# Patient Record
Sex: FEMALE | Race: BLACK OR AFRICAN AMERICAN | Employment: OTHER | URBAN - METROPOLITAN AREA
[De-identification: names, ages, dates, MRNs, and addresses within clinical notes are randomized per-mention and may not be internally consistent; named-entity substitution may affect disease eponyms.]

---

## 2018-10-20 ENCOUNTER — OP HISTORICAL/CONVERTED ENCOUNTER (OUTPATIENT)
Dept: OTHER | Age: 83
End: 2018-10-20

## 2020-11-11 ENCOUNTER — OFFICE VISIT (OUTPATIENT)
Dept: PRIMARY CARE CLINIC | Age: 85
End: 2020-11-11
Payer: MEDICARE

## 2020-11-11 VITALS — HEART RATE: 72 BPM | TEMPERATURE: 98.5 F | OXYGEN SATURATION: 95 %

## 2020-11-11 DIAGNOSIS — R05.9 COUGH: ICD-10-CM

## 2020-11-11 DIAGNOSIS — Z13.6 SCREENING FOR CARDIOVASCULAR, RESPIRATORY, AND GENITOURINARY DISEASES: Primary | ICD-10-CM

## 2020-11-11 DIAGNOSIS — Z13.89 SCREENING FOR CARDIOVASCULAR, RESPIRATORY, AND GENITOURINARY DISEASES: Primary | ICD-10-CM

## 2020-11-11 DIAGNOSIS — Z13.83 SCREENING FOR CARDIOVASCULAR, RESPIRATORY, AND GENITOURINARY DISEASES: Primary | ICD-10-CM

## 2020-11-11 PROCEDURE — 99202 OFFICE O/P NEW SF 15 MIN: CPT | Performed by: NURSE PRACTITIONER

## 2020-11-11 NOTE — PROGRESS NOTES
Romie Cochran is a 80 y.o. female who was seen in clinic today (11/11/2020) for an acute visit. Assessment/Plan:            * COVID-19 sample collected and submitted       * Patient given detailed CDC instructions contained within After Visit Summary    Diagnoses and all orders for this visit:    1. Screening for cardiovascular, respiratory, and genitourinary diseases    2. Cough       Reviewed with her that COVID-19 pandemic is an evolving situation with rapidly changing recommendations & guidelines, continue to practice hand hygiene, social distancing, wearing of facial coverings. Regardless of testing results, should still follow CDC guidelines as there is a chance of a false negative, or symptoms may be due to a cold or flu which are also transmissible. Medical decisions are made based on the the best information available at the time. Recommended she stay tuned for updates published by trusted sources and to advise your PCP of any unexpected changes in clinical condition     Subjective:   Leah Miguel was seen today for coronavirus screening. Her daughter serves as historian since pt doesn't' speak much. She notes she has a baseline cough, nasal congestion, and noisy exhalations. No observed changes from baseline. Daughter is unaware of a recent history/current: loss of smell/taste, fever. No known covid exposure, would like testing for peace of mind. Travel Screening      No screening recorded since 11/10/20 0000      Travel History   Travel since 10/11/20     No documented travel since 10/11/20          ROS - Pertinent items are noted in HPI    There is no problem list on file for this patient.     Home Medications    Not on File      Allergies not on file       Objective:   Physical Exam  General:  Walks with cane, alert, cooperative, no distress   Ears: Normal external ear canals AU   Sinuses: Normal paranasal sinuses without tenderness           Heart: normal rate, regular rhythm,   Lungs: No visible dyspnea, difficult to auscultate lungs due to noisy exhalations but seem to be clear. There were no vitals taken for this visit. Disclaimer:        Medication risks/benefits/costs/interactions/alternatives discussed with patient. She was given an after visit summary which includes diagnoses, current medications, & vitals. She expressed understanding with the diagnosis and plan. Aspects of this note may have been generated using voice recognition software. Despite editing, there may be some syntax errors.        Ilana Myers, NP

## 2020-11-15 LAB — SARS-COV-2, NAA: NOT DETECTED

## 2020-11-16 ENCOUNTER — TELEPHONE (OUTPATIENT)
Dept: PRIMARY CARE CLINIC | Age: 85
End: 2020-11-16

## 2020-12-04 ENCOUNTER — OFFICE VISIT (OUTPATIENT)
Dept: PRIMARY CARE CLINIC | Age: 85
End: 2020-12-04
Payer: MEDICARE

## 2020-12-04 VITALS
SYSTOLIC BLOOD PRESSURE: 124 MMHG | OXYGEN SATURATION: 97 % | TEMPERATURE: 97.3 F | HEART RATE: 62 BPM | DIASTOLIC BLOOD PRESSURE: 76 MMHG

## 2020-12-04 DIAGNOSIS — Z13.89 SCREENING FOR CARDIOVASCULAR, RESPIRATORY, AND GENITOURINARY DISEASES: Primary | ICD-10-CM

## 2020-12-04 DIAGNOSIS — Z13.6 SCREENING FOR CARDIOVASCULAR, RESPIRATORY, AND GENITOURINARY DISEASES: Primary | ICD-10-CM

## 2020-12-04 DIAGNOSIS — Z13.83 SCREENING FOR CARDIOVASCULAR, RESPIRATORY, AND GENITOURINARY DISEASES: Primary | ICD-10-CM

## 2020-12-04 PROCEDURE — 99212 OFFICE O/P EST SF 10 MIN: CPT | Performed by: NURSE PRACTITIONER

## 2020-12-04 NOTE — PROGRESS NOTES
Reny Cee is a 80 y.o. female who was seen in clinic today (12/4/2020) for an acute visit. Assessment/Plan:            * COVID-19 sample collected and submitted       * Patient given detailed CDC instructions contained within After Visit Summary    Diagnoses and all orders for this visit:    1. Screening for cardiovascular, respiratory, and genitourinary diseases  -     NOVEL CORONAVIRUS (COVID-19)       Surveillance due to high risk category, no known covid 19 exposure, and questionable if patient has a cough or daughter simply stated she did to meet [facility] criteria for testing. Reviewed with her that COVID-19 pandemic is an evolving situation with rapidly changing recommendations & guidelines, continue to practice hand hygiene, social distancing, wearing of facial coverings. Regardless of testing results, should still follow CDC guidelines as there is a chance of a false negative, such as a poor sample collection or being too soon to test after exposure. Medical decisions are made based on the the best information available at the time. Recommended she stay tuned for updates published by trusted sources and to advise your PCP of any unexpected changes in clinical condition     Subjective:   Kimber Mendieta was seen today for Cough (Patient's daughter reports patient has chronic cough.)  Daughter also reported she just wanted her tested for covid 19. She denies a recent history/current: loss of , cough, fever, wheezing, SOB, and SAMPSON. Non user of tob. Travel Screening     Question   Response    In the last month, have you been in contact with someone who was confirmed or suspected to have Coronavirus / COVID-19? No / Unsure    Have you had a COVID-19 viral test in the last 14 days? No    Do you have any of the following new or worsening symptoms? Cough    Have you traveled internationally in the last month?   No      Travel History   Travel since 11/04/20     No documented travel since 11/04/20 ROS - Pertinent items are noted in HPI    There is no problem list on file for this patient. Home Medications    Medication Sig Start Date End Date Taking? Authorizing Provider   ergocalciferol, vitamin D2, (VITAMIN D2 PO) Take  by mouth. Provider, Historical   trazodone HCl (TRAZODONE PO) Take  by mouth. Provider, Historical   LOSARTAN PO Take  by mouth. Provider, Historical   OXCARBAZEPINE PO Take  by mouth. Provider, Historical      No Known Allergies       Objective:   Physical Exam  General:  Overweight, alert, cooperative, no distress, walks with cane, pt evaluated in her vehicle because of difficulty ambulating   Ears: Normal external ear canals AU   Neck: supple, symmetrical, trachea midline. Heart: normal rate, regular rhythm, normal S1, S2, no murmurs, rubs, clicks or gallops. Lungs: clear to auscultation bilaterally       Visit Vitals  /76 (BP 1 Location: Left arm, BP Patient Position: Sitting)   Pulse 62   Temp 97.3 °F (36.3 °C)   SpO2 97%         Disclaimer:        Medication risks/benefits/costs/interactions/alternatives discussed with patient. She was given an after visit summary which includes diagnoses, current medications, & vitals. She expressed understanding with the diagnosis and plan. Aspects of this note may have been generated using voice recognition software. Despite editing, there may be some syntax errors.        Salvador Salazar NP

## 2020-12-08 LAB — SARS-COV-2, NAA: NOT DETECTED

## 2020-12-09 ENCOUNTER — TELEPHONE (OUTPATIENT)
Dept: PRIMARY CARE CLINIC | Age: 85
End: 2020-12-09

## 2021-02-15 ENCOUNTER — HOSPITAL ENCOUNTER (EMERGENCY)
Age: 86
Discharge: HOME | End: 2021-02-16
Attending: EMERGENCY MEDICINE
Payer: MEDICARE

## 2021-02-15 ENCOUNTER — APPOINTMENT (OUTPATIENT)
Dept: CT IMAGING | Age: 86
End: 2021-02-15
Attending: EMERGENCY MEDICINE
Payer: MEDICARE

## 2021-02-15 VITALS
HEART RATE: 76 BPM | BODY MASS INDEX: 20.32 KG/M2 | RESPIRATION RATE: 20 BRPM | DIASTOLIC BLOOD PRESSURE: 87 MMHG | WEIGHT: 150 LBS | HEIGHT: 72 IN | OXYGEN SATURATION: 99 % | TEMPERATURE: 99 F | SYSTOLIC BLOOD PRESSURE: 202 MMHG

## 2021-02-15 DIAGNOSIS — H11.32 SUBCONJUNCTIVAL HEMORRHAGE OF LEFT EYE: ICD-10-CM
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
## 2021-02-15 DIAGNOSIS — S00.12XA PERIORBITAL ECCHYMOSIS OF LEFT EYE, INITIAL ENCOUNTER: ICD-10-CM

## 2021-02-15 DIAGNOSIS — S09.90XA CLOSED HEAD INJURY, INITIAL ENCOUNTER: ICD-10-CM

## 2021-02-15 LAB
ANION GAP SERPL CALC-SCNC: 6 MMOL/L (ref 5–15)
APPEARANCE UR: CLEAR
BACTERIA URNS QL MICRO: NEGATIVE /HPF
BASOPHILS # BLD: 0 K/UL (ref 0–0.1)
BASOPHILS NFR BLD: 0 % (ref 0–1)
BILIRUB UR QL: NEGATIVE
BUN SERPL-MCNC: 38 MG/DL (ref 6–20)
BUN/CREAT SERPL: 21 (ref 12–20)
CA-I BLD-MCNC: 9.6 MG/DL (ref 8.5–10.1)
CHLORIDE SERPL-SCNC: 106 MMOL/L (ref 97–108)
CO2 SERPL-SCNC: 26 MMOL/L (ref 21–32)
COLOR UR: ABNORMAL
CREAT SERPL-MCNC: 1.85 MG/DL (ref 0.55–1.02)
DIFFERENTIAL METHOD BLD: ABNORMAL
EOSINOPHIL # BLD: 0 K/UL (ref 0–0.4)
EOSINOPHIL NFR BLD: 0 % (ref 0–7)
ERYTHROCYTE [DISTWIDTH] IN BLOOD BY AUTOMATED COUNT: 20.2 % (ref 11.5–14.5)
GLUCOSE SERPL-MCNC: 115 MG/DL (ref 65–100)
GLUCOSE UR STRIP.AUTO-MCNC: 50 MG/DL
HCT VFR BLD AUTO: 41.2 % (ref 35–47)
HGB BLD-MCNC: 12.5 G/DL (ref 11.5–16)
HGB UR QL STRIP: ABNORMAL
IMM GRANULOCYTES # BLD AUTO: 0 K/UL (ref 0–0.04)
IMM GRANULOCYTES NFR BLD AUTO: 0 % (ref 0–0.5)
KETONES UR QL STRIP.AUTO: 5 MG/DL
LEUKOCYTE ESTERASE UR QL STRIP.AUTO: NEGATIVE
LYMPHOCYTES # BLD: 0.5 K/UL (ref 0.8–3.5)
LYMPHOCYTES NFR BLD: 7 % (ref 12–49)
MCH RBC QN AUTO: 24.1 PG (ref 26–34)
MCHC RBC AUTO-ENTMCNC: 30.3 G/DL (ref 30–36.5)
MCV RBC AUTO: 79.4 FL (ref 80–99)
MONOCYTES # BLD: 0.2 K/UL (ref 0–1)
MONOCYTES NFR BLD: 2 % (ref 5–13)
NEUTS SEG # BLD: 7.2 K/UL (ref 1.8–8)
NEUTS SEG NFR BLD: 91 % (ref 32–75)
NITRITE UR QL STRIP.AUTO: NEGATIVE
NRBC # BLD: 0 K/UL (ref 0–0.01)
NRBC BLD-RTO: 0 PER 100 WBC
PH UR STRIP: 6 [PH] (ref 5–8)
PLATELET # BLD AUTO: 254 K/UL (ref 150–400)
POTASSIUM SERPL-SCNC: 5 MMOL/L (ref 3.5–5.1)
PROT UR STRIP-MCNC: 100 MG/DL
RBC # BLD AUTO: 5.19 M/UL (ref 3.8–5.2)
RBC #/AREA URNS HPF: ABNORMAL /HPF (ref 0–5)
SODIUM SERPL-SCNC: 138 MMOL/L (ref 136–145)
SP GR UR REFRACTOMETRY: 1.01 (ref 1–1.03)
UA: UC IF INDICATED,UAUC: ABNORMAL
UROBILINOGEN UR QL STRIP.AUTO: 0.1 EU/DL (ref 0.1–1)
WBC # BLD AUTO: 7.9 K/UL (ref 3.6–11)
WBC URNS QL MICRO: ABNORMAL /HPF (ref 0–4)

## 2021-02-15 PROCEDURE — 70486 CT MAXILLOFACIAL W/O DYE: CPT

## 2021-02-15 PROCEDURE — 36415 COLL VENOUS BLD VENIPUNCTURE: CPT

## 2021-02-15 PROCEDURE — 85025 COMPLETE CBC W/AUTO DIFF WBC: CPT

## 2021-02-15 PROCEDURE — 99283 EMERGENCY DEPT VISIT LOW MDM: CPT

## 2021-02-15 PROCEDURE — 70450 CT HEAD/BRAIN W/O DYE: CPT

## 2021-02-15 PROCEDURE — 72125 CT NECK SPINE W/O DYE: CPT

## 2021-02-15 PROCEDURE — 80048 BASIC METABOLIC PNL TOTAL CA: CPT

## 2021-02-15 PROCEDURE — 81001 URINALYSIS AUTO W/SCOPE: CPT

## 2021-02-15 RX ORDER — ERYTHROMYCIN 5 MG/G
OINTMENT OPHTHALMIC
Qty: 1 TUBE | Refills: 0 | Status: SHIPPED | OUTPATIENT
Start: 2021-02-15 | End: 2021-02-22

## 2021-02-15 NOTE — ED PROVIDER NOTES
EMERGENCY DEPARTMENT HISTORY AND PHYSICAL EXAM      Date: 2/15/2021  Patient Name: Sarah Marsh    History of Presenting Illness     Chief Complaint   Patient presents with    Fall       History Provided By: Patient and patient's daughter    HPI: Sarah Marsh, 80 y.o. female with PMHx as noted below presents the emergency department evaluation of fall. History is limited secondary to patient's dementia. Patient's daughter notes that she is unsteady on her feet at baseline and believes she tried to get out of bed on her own today falling to the ground. Patient's daughter heard the fall and went upstairs to check on her. Noted some swelling to her left eyes so brought her to the emergency department for evaluation. Otherwise notes patient is at her baseline mental status. Patient with no complaints when asked. PCP: Calvin Rowe MD    Current Outpatient Medications   Medication Sig Dispense Refill    erythromycin (ILOTYCIN) ophthalmic ointment 0.5cm ribbon under affected eyelid 3-4 times a day for 1 week 1 Tube 0    ergocalciferol, vitamin D2, (VITAMIN D2 PO) Take  by mouth.  trazodone HCl (TRAZODONE PO) Take  by mouth.  LOSARTAN PO Take  by mouth.  OXCARBAZEPINE PO Take  by mouth. Past History     Past Medical History:  Past Medical History:   Diagnosis Date    Hypertension        Past Surgical History:  No past surgical history on file. Family History:  Family History   Family history unknown: Yes       Social History:  Social History     Tobacco Use    Smoking status: Never Smoker    Smokeless tobacco: Never Used   Substance Use Topics    Alcohol use: Not on file    Drug use: Not on file       Allergies:  No Known Allergies      Review of Systems   Review of Systems   Unable to perform ROS: Dementia       Physical Exam   Physical Exam    GENERAL: alert , no acute distress  EYES: PEERL, there is left subconjunctival hemorrhage and chemosis.   There is periorbital ecchymosis and edema  ENT: Mucous membranes pink and moist.  NECK: Supple  LUNGS: Airway patent. Non-labored respirations. Breath sounds clear with good air entry bilaterally. HEART: Regular rate and rhythm. No peripheral edema  ABDOMEN: Non-distended and non-tender, without guarding or rebound. SKIN:  warm, dry  MSK/EXTREMITIES: Without swelling, tenderness or deformity, symmetric with normal ROM  NEUROLOGICAL: Alert,, will follow commands, moving all 4 extremities. Diagnostic Study Results     Labs -     Reviewed    Radiologic Studies -   CT MAXILLOFACIAL WO CONT   Final Result      Left periorbital facial soft tissue abnormalities consistent with   contusion/hematoma. No underlying facial fractures. CT SPINE CERV WO CONT   Final Result   1. No acute bony findings. 2. T4 vertebral body compression has occurred in the interim but appears   nonacute. Correlate for any focal pain. CT HEAD WO CONT   Final Result   No acute intracranial process. Possible nondisplaced fracture of   the left zygoma. Other findings as above. CT Results  (Last 48 hours)               02/15/21 1617  CT MAXILLOFACIAL WO CONT Final result    Impression:      Left periorbital facial soft tissue abnormalities consistent with   contusion/hematoma. No underlying facial fractures. Narrative:  CT MAXILLOFACIAL WO CONT     2/15/2021 4:17 PM; SRM         Indication: 80years old;  Female; left orbitla edema, eval for fx/ retrobulbar   hematoma; Technique: Noncontrast CT exam of the face was obtained according to standard   protocol. Multiplanar reconstructions are provided. Contrast: None. Dose reduction: All CT scans at this facility are performed using dose reduction   optimization techniques as appropriate to the performed exam including the   following: Automatic exposure control; adjustment of the mA and/or kV according   to patient size; or the use of reconstruction techniques.         Comparison: None Findings:       Left periorbital facial soft tissue abnormalities consistent with   contusion/hematoma in the given clinical setting. No underlying orbital fracture   deformities are evident. Remaining facial bones are intact. Temporomandibular joints demonstrate   significant osteoarthritic changes but otherwise articulate normally. Carious   dentition. Upper cervical degenerative osteoarthritic changes. No erosive or destructive   findings. Partial imaged brain demonstrates age appropriate volume loss. Globes and   orbital soft tissues are unremarkable. Heavily calcified atherosclerotic plaques   at the bifurcations. 02/15/21 1617  CT SPINE CERV WO CONT Final result    Impression:  1. No acute bony findings. 2. T4 vertebral body compression has occurred in the interim but appears   nonacute. Correlate for any focal pain. Narrative:  Fall. Comparison CT of the cervical spine 6/13/2016. Technique: Axial images cervical spine with sagittal and coronal reformats. 3-D   MIPs. Dose reduction: All CT scans at this facility are performed using dose reduction   optimization techniques as appropriate to a performed exam including the   following: Automated exposure control, adjustments of the mA and/or kV according   to patient's size, or use of iterative reconstruction technique. FINDINGS: The bones are demineralized which limits evaluation for fracture. No   listhesis. C6, C7, T1 vertebral body compressions are unchanged. T4 vertebral   body almost complete compression appears nonacute but has occurred in the   interim. No evident acute fracture, jumped or perched facet. Multilevel disc   greater than placenta narrowing, sclerosis, osteophytosis. 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 Lateral pillars similar alignment as previous. No prevertebral soft tissue   swelling. Lung apices clear.            02/15/21 1229  CT HEAD WO CONT Final result    Impression:  No acute intracranial process. Possible nondisplaced fracture of   the left zygoma. Other findings as above. Narrative:  CT head, 2/15/2021       History: Fall. Technique: No intravenous contrast was administered. Multiple contiguous axial   images were acquired from the vertex to the skull base. Coronal and sagittal   reconstruction was made. All CT scans at this facility are performed using dose reduction optimization   techniques as appropriate to perform the exam including the following: Automated   exposure control, adjustments of the mA and/or kV according to patient size, or   use iterative reconstruction technique. Comparison: Including CT head 11/10/2019. Findings: Image quality is degraded by motion. Cortical volume loss and associated ventricular system dilatation are stable. Periventricular and subcortical low attenuation is suggestive of small vessel   ischemic disease. Gray-white differentiation is preserved. No midline shift or   acute intracranial hemorrhage is noted. The calvarium appears intact. Degenerative changes at C1-C2 are partially imaged. Left-sided periorbital soft tissue swelling and hematoma are present. The   orbits and globes are intact. The nasal bones show no acute findings. The   right zygoma is intact. The left zygoma has linear lucency which could be   artifact as image quality is degraded at this level or nondisplaced fracture. The paranasal sinuses and mastoid air cells are clear. CXR Results  (Last 48 hours)    None            Medical Decision Making     ILyla MD am the first provider for this patient and am the attending of record for this patient encounter. I reviewed the vital signs, available nursing notes, past medical history, past surgical history, family history and social history. Vital Signs-Reviewed the patient's vital signs.     Records Reviewed: Nursing Notes and Old Medical Records    Provider Notes (Medical Decision Making): On presentation, the patient is well appearing, in no acute distress. Based on my history and exam the differential diagnosis for this patient includes intracranial hemorrhage, cervical spine injury, facial fracture, syncope. Labs are notable for elevated creatinine. Otherwise CT scans showed no acute findings. On exam patient does have a left subconjunctival hemorrhage with some chemosis noted. Unable to assess visual acuity secondary to patient's underlying dementia. I did discuss with patient's daughter as she notes the patient does have chronic kidney disease and follows with a nephrologist although is unsure of her baseline creatinine however feels she is probably not far from her baseline as she is seeing a nephrologist.  Patient's daughter expresses a follow-up from the clinic at home given her advanced age would not want any heroic interventions or surgeries performed. I informed her that she will need to see an ophthalmologist secondary to left eye chemosis and subconjunctival hemorrhage however given her age do not feel that transferring for this would be in her best interest.  Patient's blood pressure was elevated and daughter was made aware. Feel that some degree of this is anxiety of being in the emergency department without any familiar faces to comfort her. This should be rechecked within 48 hours by primary care physician. ED Course:   Initial assessment performed. The patients presenting problems have been discussed with the patient's daughter, and they are in agreement with the care plan formulated and outlined with them. I have encouraged them to ask questions as they arise throughout their visit. PROGRESS  Krista Sam Errol's  results have been reviewed with her and her daughter. She has been counseled regarding her diagnosis.   She verbally conveys understanding and agreement of the signs, symptoms, diagnosis, treatment and prognosis and additionally agrees to follow up as recommended. She also agrees with the care-plan and conveys that all of her questions have been answered. I have also put together some discharge instructions for her that include: 1) educational information regarding their diagnosis, 2) how to care for their diagnosis at home, as well a 3) list of reasons why they would want to return to the ED prior to their follow-up appointment, should their condition change. Disposition:  home    PLAN:  1. Discharge Medication List as of 2/16/2021  6:01 AM      START taking these medications    Details   erythromycin (ILOTYCIN) ophthalmic ointment 0.5cm ribbon under affected eyelid 3-4 times a day for 1 week, Normal, Disp-1 Tube, R-0         CONTINUE these medications which have NOT CHANGED    Details   ergocalciferol, vitamin D2, (VITAMIN D2 PO) Take  by mouth., Historical Med      trazodone HCl (TRAZODONE PO) Take  by mouth., Historical Med      LOSARTAN PO Take  by mouth., Historical Med      OXCARBAZEPINE PO Take  by mouth., Historical Med           2. Follow-up Information     Follow up With Specialties Details Why [address] EMERGENCY DEPT Emergency Medicine  If symptoms worsen [address] [address]  [phone]    Follow-up with primary care physician in 1 to 2 days        [facility]  Schedule an appointment as soon as possible for a visit in 2 days  [address] [place] [address]        Return to ED if worse     Diagnosis     Clinical Impression:   1. Fall, initial encounter    2. Closed head injury, initial encounter    3. Subconjunctival hemorrhage of left eye    4. Periorbital ecchymosis of left eye, initial encounter        Please note that this dictation was completed with Dragon, computer voice recognition software.   Quite often unanticipated grammatical, syntax, homophones, and other interpretive errors are inadvertently transcribed by the computer software. Please disregard these errors. Additionally, please excuse any errors that have escaped final proofreading.

## 2021-02-15 NOTE — ED TRIAGE NOTES
[facility] 14 this am pt sustained a fall around 4 am; daughter stated that pt did not have any LOC unsure if pt takes blood thinners; pt does have swelling over left eye

## 2021-04-08 ENCOUNTER — HOSPITAL ENCOUNTER (INPATIENT)
Age: 86
LOS: 9 days | Discharge: NURSING, REHAB OR LONG-TERM CARE | DRG: 177 | End: 2021-04-18
Attending: EMERGENCY MEDICINE | Admitting: FAMILY MEDICINE
Payer: MEDICARE

## 2021-04-08 ENCOUNTER — APPOINTMENT (OUTPATIENT)
Dept: GENERAL RADIOLOGY | Age: 86
DRG: 177 | End: 2021-04-08
Attending: EMERGENCY MEDICINE
Payer: MEDICARE

## 2021-04-08 DIAGNOSIS — Z87.448 HISTORY OF RENAL DISEASE: ICD-10-CM

## 2021-04-08 DIAGNOSIS — N39.0 URINARY TRACT INFECTION WITHOUT HEMATURIA, SITE UNSPECIFIED: ICD-10-CM

## 2021-04-08 DIAGNOSIS — J18.9 PNEUMONIA OF LEFT LOWER LOBE DUE TO INFECTIOUS ORGANISM: Primary | ICD-10-CM

## 2021-04-08 LAB
ALBUMIN SERPL-MCNC: 2.6 G/DL (ref 3.5–5)
ALBUMIN/GLOB SERPL: 0.5 {RATIO} (ref 1.1–2.2)
ALP SERPL-CCNC: 53 U/L (ref 45–117)
ALT SERPL-CCNC: 14 U/L (ref 12–78)
ANION GAP SERPL CALC-SCNC: 7 MMOL/L (ref 5–15)
APPEARANCE UR: ABNORMAL
AST SERPL W P-5'-P-CCNC: 33 U/L (ref 15–37)
BACTERIA URNS QL MICRO: ABNORMAL /HPF
BASOPHILS # BLD: 0 K/UL (ref 0–0.1)
BASOPHILS NFR BLD: 0 % (ref 0–1)
BILIRUB SERPL-MCNC: 0.4 MG/DL (ref 0.2–1)
BILIRUB UR QL: NEGATIVE
BNP SERPL-MCNC: 1559 PG/ML
BUN SERPL-MCNC: 54 MG/DL (ref 6–20)
BUN/CREAT SERPL: 17 (ref 12–20)
CA-I BLD-MCNC: 9.2 MG/DL (ref 8.5–10.1)
CHLORIDE SERPL-SCNC: 105 MMOL/L (ref 97–108)
CO2 SERPL-SCNC: 28 MMOL/L (ref 21–32)
COLOR UR: YELLOW
CREAT SERPL-MCNC: 3.24 MG/DL (ref 0.55–1.02)
DIFFERENTIAL METHOD BLD: ABNORMAL
EOSINOPHIL # BLD: 0 K/UL (ref 0–0.4)
EOSINOPHIL NFR BLD: 0 % (ref 0–7)
ERYTHROCYTE [DISTWIDTH] IN BLOOD BY AUTOMATED COUNT: 18.6 % (ref 11.5–14.5)
GLOBULIN SER CALC-MCNC: 4.9 G/DL (ref 2–4)
GLUCOSE SERPL-MCNC: 239 MG/DL (ref 65–100)
GLUCOSE UR STRIP.AUTO-MCNC: NEGATIVE MG/DL
HCT VFR BLD AUTO: 34.2 % (ref 35–47)
HEMOCCULT SP1 STL QL: NEGATIVE
HGB BLD-MCNC: 10.6 G/DL (ref 11.5–16)
HGB UR QL STRIP: ABNORMAL
IMM GRANULOCYTES # BLD AUTO: 0.1 K/UL (ref 0–0.04)
IMM GRANULOCYTES NFR BLD AUTO: 1 % (ref 0–0.5)
KETONES UR QL STRIP.AUTO: NEGATIVE MG/DL
LACTATE SERPL-SCNC: 1.4 MMOL/L (ref 0.4–2)
LEUKOCYTE ESTERASE UR QL STRIP.AUTO: ABNORMAL
LYMPHOCYTES # BLD: 0.6 K/UL (ref 0.8–3.5)
LYMPHOCYTES NFR BLD: 5 % (ref 12–49)
MCH RBC QN AUTO: 23.8 PG (ref 26–34)
MCHC RBC AUTO-ENTMCNC: 31 G/DL (ref 30–36.5)
MCV RBC AUTO: 76.9 FL (ref 80–99)
MONOCYTES # BLD: 0.3 K/UL (ref 0–1)
MONOCYTES NFR BLD: 2 % (ref 5–13)
NEUTS SEG # BLD: 11.4 K/UL (ref 1.8–8)
NEUTS SEG NFR BLD: 92 % (ref 32–75)
NITRITE UR QL STRIP.AUTO: NEGATIVE
NRBC # BLD: 0 K/UL (ref 0–0.01)
NRBC BLD-RTO: 0 PER 100 WBC
OTHER URINE MICRO,5051: PRESENT
PH UR STRIP: 5 [PH] (ref 5–8)
PLATELET # BLD AUTO: 288 K/UL (ref 150–400)
PMV BLD AUTO: 10.7 FL (ref 8.9–12.9)
POTASSIUM SERPL-SCNC: 4.2 MMOL/L (ref 3.5–5.1)
PROT SERPL-MCNC: 7.5 G/DL (ref 6.4–8.2)
PROT UR STRIP-MCNC: 100 MG/DL
RBC # BLD AUTO: 4.45 M/UL (ref 3.8–5.2)
RBC #/AREA URNS HPF: ABNORMAL /HPF (ref 0–5)
SODIUM SERPL-SCNC: 140 MMOL/L (ref 136–145)
SP GR UR REFRACTOMETRY: 1.01 (ref 1–1.03)
TROPONIN I SERPL-MCNC: 0.06 NG/ML
UA: UC IF INDICATED,UAUC: ABNORMAL
UROBILINOGEN UR QL STRIP.AUTO: 0.1 EU/DL (ref 0.1–1)
WBC # BLD AUTO: 12.3 K/UL (ref 3.6–11)
WBC URNS QL MICRO: ABNORMAL /HPF (ref 0–4)
YEAST URNS QL MICRO: PRESENT

## 2021-04-08 PROCEDURE — 84484 ASSAY OF TROPONIN QUANT: CPT

## 2021-04-08 PROCEDURE — 74011000258 HC RX REV CODE- 258: Performed by: EMERGENCY MEDICINE

## 2021-04-08 PROCEDURE — 96365 THER/PROPH/DIAG IV INF INIT: CPT

## 2021-04-08 PROCEDURE — 36415 COLL VENOUS BLD VENIPUNCTURE: CPT

## 2021-04-08 PROCEDURE — 82272 OCCULT BLD FECES 1-3 TESTS: CPT

## 2021-04-08 PROCEDURE — 93005 ELECTROCARDIOGRAM TRACING: CPT

## 2021-04-08 PROCEDURE — 83605 ASSAY OF LACTIC ACID: CPT

## 2021-04-08 PROCEDURE — 74011250636 HC RX REV CODE- 250/636: Performed by: EMERGENCY MEDICINE

## 2021-04-08 PROCEDURE — 99285 EMERGENCY DEPT VISIT HI MDM: CPT

## 2021-04-08 PROCEDURE — 87086 URINE CULTURE/COLONY COUNT: CPT

## 2021-04-08 PROCEDURE — 51701 INSERT BLADDER CATHETER: CPT

## 2021-04-08 PROCEDURE — 96366 THER/PROPH/DIAG IV INF ADDON: CPT

## 2021-04-08 PROCEDURE — 85025 COMPLETE CBC W/AUTO DIFF WBC: CPT

## 2021-04-08 PROCEDURE — 74011250637 HC RX REV CODE- 250/637: Performed by: NURSE PRACTITIONER

## 2021-04-08 PROCEDURE — 83880 ASSAY OF NATRIURETIC PEPTIDE: CPT

## 2021-04-08 PROCEDURE — 71045 X-RAY EXAM CHEST 1 VIEW: CPT

## 2021-04-08 PROCEDURE — 80053 COMPREHEN METABOLIC PANEL: CPT

## 2021-04-08 PROCEDURE — 81001 URINALYSIS AUTO W/SCOPE: CPT

## 2021-04-08 RX ORDER — ACETAMINOPHEN 650 MG/1
650 SUPPOSITORY RECTAL
Status: COMPLETED | OUTPATIENT
Start: 2021-04-08 | End: 2021-04-08

## 2021-04-08 RX ADMIN — SODIUM CHLORIDE 500 ML: 9 INJECTION, SOLUTION INTRAVENOUS at 22:20

## 2021-04-08 RX ADMIN — ACETAMINOPHEN 650 MG: 650 SUPPOSITORY RECTAL at 20:40

## 2021-04-08 RX ADMIN — PIPERACILLIN AND TAZOBACTAM 3.38 G: 3; .375 INJECTION, POWDER, LYOPHILIZED, FOR SOLUTION INTRAVENOUS at 22:20

## 2021-04-08 NOTE — ED TRIAGE NOTES
Pt from [facility]. Was choking on his dinner and is having sob non verbal per ems ANO x 1 is baseline.

## 2021-04-08 NOTE — Clinical Note
Status[de-identified] INPATIENT [101]   Type of Bed: Telemetry [19]   Inpatient Hospitalization Certified Necessary for the Following Reasons: 3.  Patient receiving treatment that can only be provided in an inpatient setting (further clarification in H&P documentation)   Admitting Diagnosis: Pneumonia [787319]   Admitting Physician: Rebekah Prakash [[id number]]   Attending Physician: Rebekah Prakash [[id number]]   Estimated Length of Stay: 2 Midnights   Discharge Plan[de-identified] Extended Care Facility (e.g. Adult Home, Nursing Home, etc.)

## 2021-04-09 PROBLEM — J18.9 PNEUMONIA: Status: ACTIVE | Noted: 2021-04-09

## 2021-04-09 PROBLEM — U07.1 COVID-19: Status: ACTIVE | Noted: 2021-04-09

## 2021-04-09 LAB
ATRIAL RATE: 100 BPM
CALCULATED P AXIS, ECG09: 82 DEGREES
CALCULATED R AXIS, ECG10: 4 DEGREES
CALCULATED T AXIS, ECG11: 93 DEGREES
COVID-19 RAPID TEST, COVR: DETECTED
DIAGNOSIS, 93000: NORMAL
GLUCOSE BLD STRIP.AUTO-MCNC: 126 MG/DL (ref 65–100)
GLUCOSE BLD STRIP.AUTO-MCNC: 145 MG/DL (ref 65–100)
GLUCOSE BLD STRIP.AUTO-MCNC: 226 MG/DL (ref 65–100)
P-R INTERVAL, ECG05: 224 MS
PERFORMED BY, TECHID: ABNORMAL
Q-T INTERVAL, ECG07: 332 MS
QRS DURATION, ECG06: 86 MS
QTC CALCULATION (BEZET), ECG08: 428 MS
SARS-COV-2, COV2: NORMAL
SPECIMEN SOURCE: ABNORMAL
VENTRICULAR RATE, ECG03: 100 BPM

## 2021-04-09 PROCEDURE — 74011000250 HC RX REV CODE- 250: Performed by: FAMILY MEDICINE

## 2021-04-09 PROCEDURE — 65270000029 HC RM PRIVATE

## 2021-04-09 PROCEDURE — 82962 GLUCOSE BLOOD TEST: CPT

## 2021-04-09 PROCEDURE — 87635 SARS-COV-2 COVID-19 AMP PRB: CPT

## 2021-04-09 PROCEDURE — 74011250636 HC RX REV CODE- 250/636: Performed by: FAMILY MEDICINE

## 2021-04-09 PROCEDURE — 96366 THER/PROPH/DIAG IV INF ADDON: CPT

## 2021-04-09 RX ORDER — ACETAMINOPHEN 325 MG/1
650 TABLET ORAL
Status: DISCONTINUED | OUTPATIENT
Start: 2021-04-09 | End: 2021-04-18 | Stop reason: HOSPADM

## 2021-04-09 RX ORDER — HEPARIN SODIUM 5000 [USP'U]/ML
5000 INJECTION, SOLUTION INTRAVENOUS; SUBCUTANEOUS EVERY 8 HOURS
Status: DISCONTINUED | OUTPATIENT
Start: 2021-04-09 | End: 2021-04-18 | Stop reason: HOSPADM

## 2021-04-09 RX ORDER — POLYETHYLENE GLYCOL 3350 17 G/17G
17 POWDER, FOR SOLUTION ORAL DAILY PRN
Status: DISCONTINUED | OUTPATIENT
Start: 2021-04-09 | End: 2021-04-18 | Stop reason: HOSPADM

## 2021-04-09 RX ORDER — ONDANSETRON 2 MG/ML
4 INJECTION INTRAMUSCULAR; INTRAVENOUS
Status: DISCONTINUED | OUTPATIENT
Start: 2021-04-09 | End: 2021-04-18 | Stop reason: HOSPADM

## 2021-04-09 RX ORDER — ACETAMINOPHEN 650 MG/1
650 SUPPOSITORY RECTAL
Status: DISCONTINUED | OUTPATIENT
Start: 2021-04-09 | End: 2021-04-18 | Stop reason: HOSPADM

## 2021-04-09 RX ORDER — DEXAMETHASONE SODIUM PHOSPHATE 4 MG/ML
4 INJECTION, SOLUTION INTRA-ARTICULAR; INTRALESIONAL; INTRAMUSCULAR; INTRAVENOUS; SOFT TISSUE EVERY 8 HOURS
Status: DISCONTINUED | OUTPATIENT
Start: 2021-04-09 | End: 2021-04-12

## 2021-04-09 RX ORDER — SODIUM CHLORIDE 9 MG/ML
100 INJECTION, SOLUTION INTRAVENOUS CONTINUOUS
Status: DISCONTINUED | OUTPATIENT
Start: 2021-04-09 | End: 2021-04-13

## 2021-04-09 RX ORDER — ONDANSETRON 4 MG/1
4 TABLET, ORALLY DISINTEGRATING ORAL
Status: DISCONTINUED | OUTPATIENT
Start: 2021-04-09 | End: 2021-04-18 | Stop reason: HOSPADM

## 2021-04-09 RX ADMIN — DEXAMETHASONE SODIUM PHOSPHATE 4 MG: 4 INJECTION, SOLUTION INTRAMUSCULAR; INTRAVENOUS at 21:00

## 2021-04-09 RX ADMIN — SODIUM CHLORIDE 50 ML/HR: 9 INJECTION, SOLUTION INTRAVENOUS at 15:00

## 2021-04-09 RX ADMIN — AZITHROMYCIN DIHYDRATE 500 MG: 500 INJECTION, POWDER, LYOPHILIZED, FOR SOLUTION INTRAVENOUS at 15:47

## 2021-04-09 RX ADMIN — DEXAMETHASONE SODIUM PHOSPHATE 4 MG: 4 INJECTION, SOLUTION INTRAMUSCULAR; INTRAVENOUS at 15:46

## 2021-04-09 RX ADMIN — HEPARIN SODIUM 5000 UNITS: 5000 INJECTION INTRAVENOUS; SUBCUTANEOUS at 15:57

## 2021-04-09 RX ADMIN — CEFTRIAXONE SODIUM 1 G: 1 INJECTION, POWDER, FOR SOLUTION INTRAMUSCULAR; INTRAVENOUS at 15:46

## 2021-04-09 RX ADMIN — HEPARIN SODIUM 5000 UNITS: 5000 INJECTION INTRAVENOUS; SUBCUTANEOUS at 21:00

## 2021-04-09 NOTE — ED PROVIDER NOTES
EMERGENCY DEPARTMENT HISTORY AND PHYSICAL EXAM      Date: 4/8/2021  Patient Name: Reji Heller      History of Presenting Illness     Chief Complaint   Patient presents with    Respiratory Distress       History Provided By: Patient, EMS and Nursing Home/SNF/Rehab Center    HPI: Reji Heller, 80 y.o. female with a past medical history significant hypertension, chronic kidney disease, insomnia, Covid infection, presents to the ED with cc of acute onset of shortness of breath and cough while having dinner that happened sometime this afternoon at the skilled care facility. Patient was transported to the emergency room. It appears patient has history of dementia in the past.  No more information has been given. Most of information was given by EMS and nursing home staff. No other complaints. There are no other complaints, changes, or physical findings at this time. PCP: Calvin Rowe MD    Current Facility-Administered Medications   Medication Dose Route Frequency Provider Last Rate Last Admin    piperacillin-tazobactam (ZOSYN) 3.375 g in 0.9% sodium chloride (MBP/ADV) 100 mL MBP  3.375 g IntraVENous NOW Adonay Schuster, DO 25 mL/hr at 04/08/21 2220 3.375 g at 04/08/21 2220     Current Outpatient Medications   Medication Sig Dispense Refill    ergocalciferol, vitamin D2, (VITAMIN D2 PO) Take  by mouth.  trazodone HCl (TRAZODONE PO) Take  by mouth.  LOSARTAN PO Take  by mouth.  OXCARBAZEPINE PO Take  by mouth. Past History     Past Medical History:  Past Medical History:   Diagnosis Date    Anemia     Chronic kidney disease     COVID-19     Hypertension     Hypothyroidism     Insomnia        Past Surgical History:  History reviewed. No pertinent surgical history.     Family History:  Family History   Family history unknown: Yes       Social History:  Social History     Tobacco Use    Smoking status: Never Smoker    Smokeless tobacco: Never Used   Substance Use Topics    Alcohol use: Not on file    Drug use: Not on file       Allergies:  No Known Allergies      Review of Systems     Review of Systems   Constitutional: Positive for chills, fatigue and fever. HENT: Negative. Eyes: Negative. Respiratory: Positive for cough and shortness of breath. Cardiovascular: Negative. Gastrointestinal: Negative. Endocrine: Negative. Genitourinary: Negative. Musculoskeletal: Negative. Skin: Negative. Allergic/Immunologic: Negative. Neurological: Negative. Psychiatric/Behavioral: Negative. All other systems reviewed and are negative. Physical Exam     Physical Exam  Vitals signs and nursing note reviewed. Constitutional:       General: She is in acute distress. Appearance: Normal appearance. She is ill-appearing. HENT:      Head: Normocephalic and atraumatic. Nose: Nose normal. No congestion or rhinorrhea. Eyes:      General: No scleral icterus. Right eye: No discharge. Left eye: No discharge. Extraocular Movements: Extraocular movements intact. Conjunctiva/sclera: Conjunctivae normal.      Pupils: Pupils are equal, round, and reactive to light. Neck:      Musculoskeletal: Normal range of motion and neck supple. No neck rigidity or muscular tenderness. Cardiovascular:      Rate and Rhythm: Normal rate and regular rhythm. Pulses: Normal pulses. Heart sounds: Normal heart sounds. Pulmonary:      Effort: Respiratory distress present. Breath sounds: Rhonchi present. Comments: Decreased breath sounds at the left lower lobe. Abdominal:      General: Abdomen is flat. There is no distension. Palpations: Abdomen is soft. Tenderness: There is no abdominal tenderness. There is no right CVA tenderness, left CVA tenderness, guarding or rebound. Musculoskeletal: Normal range of motion. General: No swelling, tenderness, deformity or signs of injury. Right lower leg: No edema. Left lower leg: No edema. Skin:     General: Skin is warm. Coloration: Skin is not jaundiced or pale. Findings: No bruising, erythema, lesion or rash. Neurological:      General: No focal deficit present. Mental Status: She is alert. Mental status is at baseline. Cranial Nerves: No cranial nerve deficit. Sensory: No sensory deficit. Comments: Positive generalized weakness. No other acute neurological deficit. Psychiatric:         Behavior: Behavior normal.         Lab and Diagnostic Study Results     Labs -     Recent Results (from the past 12 hour(s))   CBC WITH AUTOMATED DIFF    Collection Time: 04/08/21  8:00 PM   Result Value Ref Range    WBC 12.3 (H) 3.6 - 11.0 K/uL    RBC 4.45 3.80 - 5.20 M/uL    HGB 10.6 (L) 11.5 - 16.0 g/dL    HCT 34.2 (L) 35.0 - 47.0 %    MCV 76.9 (L) 80.0 - 99.0 FL    MCH 23.8 (L) 26.0 - 34.0 PG    MCHC 31.0 30.0 - 36.5 g/dL    RDW 18.6 (H) 11.5 - 14.5 %    PLATELET 346 585 - 007 K/uL    MPV 10.7 8.9 - 12.9 FL    NRBC 0.0 0  WBC    ABSOLUTE NRBC 0.00 0.00 - 0.01 K/uL    NEUTROPHILS 92 (H) 32 - 75 %    LYMPHOCYTES 5 (L) 12 - 49 %    MONOCYTES 2 (L) 5 - 13 %    EOSINOPHILS 0 0 - 7 %    BASOPHILS 0 0 - 1 %    IMMATURE GRANULOCYTES 1 (H) 0.0 - 0.5 %    ABS. NEUTROPHILS 11.4 (H) 1.8 - 8.0 K/UL    ABS. LYMPHOCYTES 0.6 (L) 0.8 - 3.5 K/UL    ABS. MONOCYTES 0.3 0.0 - 1.0 K/UL    ABS. EOSINOPHILS 0.0 0.0 - 0.4 K/UL    ABS. BASOPHILS 0.0 0.0 - 0.1 K/UL    ABS. IMM.  GRANS. 0.1 (H) 0.00 - 0.04 K/UL    DF AUTOMATED     METABOLIC PANEL, COMPREHENSIVE    Collection Time: 04/08/21  8:00 PM   Result Value Ref Range    Sodium 140 136 - 145 mmol/L    Potassium 4.2 3.5 - 5.1 mmol/L    Chloride 105 97 - 108 mmol/L    CO2 28 21 - 32 mmol/L    Anion gap 7 5 - 15 mmol/L    Glucose 239 (H) 65 - 100 mg/dL    BUN 54 (H) 6 - 20 mg/dL    Creatinine 3.24 (H) 0.55 - 1.02 mg/dL    BUN/Creatinine ratio 17 12 - 20      GFR est AA 16 (L) >60 ml/min/1.73m2    GFR est non-AA 13 (L) >60 ml/min/1.73m2    Calcium 9.2 8.5 - 10.1 mg/dL    Bilirubin, total 0.4 0.2 - 1.0 mg/dL    AST (SGOT) 33 15 - 37 U/L    ALT (SGPT) 14 12 - 78 U/L    Alk. phosphatase 53 45 - 117 U/L    Protein, total 7.5 6.4 - 8.2 g/dL    Albumin 2.6 (L) 3.5 - 5.0 g/dL    Globulin 4.9 (H) 2.0 - 4.0 g/dL    A-G Ratio 0.5 (L) 1.1 - 2.2     TROPONIN I    Collection Time: 04/08/21  8:00 PM   Result Value Ref Range    Troponin-I, Qt. 0.06 (H) <0.05 ng/mL   BNP    Collection Time: 04/08/21  8:00 PM   Result Value Ref Range    NT pro-BNP 1,559 (H) <450 pg/mL   LACTIC ACID    Collection Time: 04/08/21  8:00 PM   Result Value Ref Range    Lactic acid 1.4 0.4 - 2.0 mmol/L   OCCULT BLOOD, STOOL    Collection Time: 04/08/21  8:00 PM   Result Value Ref Range    Occult Blood,day 1 Negative Negative     URINALYSIS W/ REFLEX CULTURE    Collection Time: 04/08/21  8:00 PM    Specimen: Urine   Result Value Ref Range    Color Yellow      Appearance Turbid (A) Clear      Specific gravity 1.015 1.003 - 1.030      pH (UA) 5.0 5.0 - 8.0      Protein 100 (A) Negative mg/dL    Glucose Negative Negative mg/dL    Ketone Negative Negative mg/dL    Bilirubin Negative Negative      Blood Small (A) Negative      Urobilinogen 0.1 0.1 - 1.0 EU/dL    Nitrites Negative Negative      Leukocyte Esterase Trace (A) Negative      UA:UC IF INDICATED Urine Culture Ordered (A) Culture not indicated by UA result      WBC 20-50 0 - 4 /hpf    RBC 0-5 0 - 5 /hpf    Bacteria 4+ (A) Negative /hpf    PRESUMPTIVE YEAST Present      Other Urine Micro Present         Radiologic Studies -   [unfilled]  CT Results  (Last 48 hours)    None        CXR Results  (Last 48 hours)               04/08/21 2000  XR CHEST SNGL V Final result    Narrative:  History: Sepsis? Single view of the chest was obtained. Heart size is enlarged. This is an   increased patchy airspace disease in the lung bases especially on the left   retrocardiac region. Upper lungs are clear.  No effusions or pneumothorax. Bony   structures appear osteopenic. There are degenerative changes. No acute bony   abnormality. Pression: Changes consistent with left lower lobe infiltrate. Follow-up chest   x-rays recommended. Medical Decision Making and ED Course   - I am the first and primary provider for this patient AND AM THE PRIMARY PROVIDER OF RECORD. - I reviewed the vital signs, available nursing notes, past medical history, past surgical history, family history and social history. - Initial assessment performed. The patients presenting problems have been discussed, and the staff are in agreement with the care plan formulated and outlined with them. I have encouraged them to ask questions as they arise throughout their visit. Vital Signs-Reviewed the patient's vital signs. Patient Vitals for the past 12 hrs:   Temp Pulse Resp BP SpO2   04/09/21 0014  73 22 125/60 100 %   04/08/21 2234  82 30 124/62 100 %   04/08/21 2134 98.9 °F (37.2 °C) 84 28 133/63 100 %   04/08/21 2028     98 %   04/1934 (!) 101.4 °F (38.6 °C) 99 30 130/62 92 %       EKG interpretation: (Preliminary): EKG was done at 7:41 PM.  Borderline sinus tachycardia. Rate of 100. Normal axis. No acute ST-T elevation. No STEMI. No old EKG available for comparison at this time. Records Reviewed: Nursing Notes        ED Course:              Provider Notes (Medical Decision Making):     MDM  Number of Diagnoses or Management Options  History of renal disease: established, worsening  Pneumonia of left lower lobe due to infectious organism: new, needed workup  Urinary tract infection without hematuria, site unspecified: new, needed workup  Diagnosis management comments: Patient diagnoses include pneumonia, bronchitis, metabolic abnormality, UTI, fever, renal insufficiency, acute kidney injury, worsening of the renal disease.        Amount and/or Complexity of Data Reviewed  Clinical lab tests: ordered and reviewed  Tests in the radiology section of CPT®: ordered and reviewed  Tests in the medicine section of CPT®: reviewed and ordered  Discuss the patient with other providers: yes (Case discussed with . Will admit patient to his service.)  Independent visualization of images, tracings, or specimens: yes (EKG was done at 7:41 PM.  Borderline sinus tachycardia. Rate of 100. Normal axis. No acute ST-T elevation. No STEMI. No old EKG available for comparison at this time.)    Risk of Complications, Morbidity, and/or Mortality  Presenting problems: high  Diagnostic procedures: high  Management options: high  General comments: Patient remained stable throughout the course of treatment. Blood culture was ordered. Antibiotic was given. Labs were positive for leukocytosis, mild elevation of troponin, BNP elevation and UA to be positive for possible UTI. I believe mild elevation of troponin at [facility] is due to the patient her renal disease. Chest x-ray was positive for a left lower lobe infiltrate. Since patient has a fever, leukocytosis decision was made to treat patient for pneumonia. Antibiotics were given. Case discussed with admitting physician. Will admit to his service. Consultations:       Consultations:         Procedures and Critical Care       Performed by: Sheree Thomas DO  PROCEDURES:  Procedures               CRITICAL CARE NOTE :  1:47 AM  Amount of Critical Care Time: (minutes)    IMPENDING DETERIORATION -  ASSOCIATED RISK FACTORS -   MANAGEMENT-   INTERPRETATION -    INTERVENTIONS -   CASE REVIEW -   TREATMENT RESPONSE -  PERFORMED BY -     NOTES   :  I have spent critical care time involved in lab review, consultations with specialist, family decision- making, bedside attention and documentation. This time excludes time spent in any separate billed procedures. During this entire length of time I was immediately available to the patient .     Sheree Thomas DO        Disposition     Disposition: Condition stable and improved    Admitted      Diagnosis     Clinical Impression:   1. Pneumonia of left lower lobe due to infectious organism    2. Urinary tract infection without hematuria, site unspecified    3. History of renal disease        Attestations:    Zully Salvador, DO    Please note that this dictation was completed with [employer], the computer voice recognition software. Quite often unanticipated grammatical, syntax, homophones, and other interpretive errors are inadvertently transcribed by the computer software. Please disregard these errors. Please excuse any errors that have escaped final proofreading. Thank you.

## 2021-04-09 NOTE — H&P
History and Physical    NAME: Danelle Downs   :  1928   MRN:  [id number]     Date/Time:  2021 9:34 AM    Patient PCP: Calvin Rowe MD  ______________________________________________________________________             Subjective:     CHIEF COMPLAINT:     Shortness of breath    HISTORY OF PRESENT ILLNESS:       Patient is a 80y.o. year old female nursing home resident history of chronic kidney disease COVID-19 hypertension hypothyroidism insomnia came to the ER seen by the ER physician chest x-ray shows pneumonia patient was Covid positive patient was admitted for further evaluation and treatment    Past Medical History:   Diagnosis Date    Anemia     Chronic kidney disease     COVID-19     Hypertension     Hypothyroidism     Insomnia         History reviewed. No pertinent surgical history. Social History     Tobacco Use    Smoking status: Never Smoker    Smokeless tobacco: Never Used   Substance Use Topics    Alcohol use: Not on file        Family History   Family history unknown: Yes       No Known Allergies     Prior to Admission medications    Medication Sig Start Date End Date Taking? Authorizing Provider   ergocalciferol, vitamin D2, (VITAMIN D2 PO) Take  by mouth. Provider, Historical   trazodone HCl (TRAZODONE PO) Take  by mouth. Provider, Historical   LOSARTAN PO Take  by mouth. Provider, Historical   OXCARBAZEPINE PO Take  by mouth. Provider, Historical         Current Facility-Administered Medications:     . PHARMACY TO SUBSTITUTE PER PROTOCOL (Reordered from: ergocalciferol, vitamin D2, (VITAMIN D2 PO)), , , Per Protocol, Areli Garcia MD    0.9% sodium chloride infusion, 50 mL/hr, IntraVENous, CONTINUOUS, Areli Garcia MD    acetaminophen (TYLENOL) tablet 650 mg, 650 mg, Oral, Q6H PRN **OR** acetaminophen (TYLENOL) suppository 650 mg, 650 mg, Rectal, Q6H PRN, Reynaldo Garcia MD    polyethylene glycol (MIRALAX) packet 17 g, 17 g, Oral, DAILY PRN, Radha Ludy Burnham MD    ondansetron [facility] ODT) tablet 4 mg, 4 mg, Oral, Q8H PRN **OR** ondansetron (ZOFRAN) injection 4 mg, 4 mg, IntraVENous, Q6H PRN, Reynaldo Garcia MD    heparin (porcine) injection 5,000 Units, 5,000 Units, SubCUTAneous, Q8H, Reynaldo Garcia MD    dexamethasone (DECADRON) 4 mg/mL injection 4 mg, 4 mg, IntraVENous, Q8H, Reynaldo Garcia MD    azithromycin (ZITHROMAX) 500 mg in 0.9% sodium chloride 250 mL IVPB, 500 mg, IntraVENous, Q24H, Ludy Garcia MD    cefTRIAXone (ROCEPHIN) 1 g in sterile water (preservative free) 10 mL IV syringe, 1 g, IntraVENous, Q24H, Reynaldo Garcia MD    Current Outpatient Medications:     ergocalciferol, vitamin D2, (VITAMIN D2 PO), Take  by mouth., Disp: , Rfl:     trazodone HCl (TRAZODONE PO), Take  by mouth., Disp: , Rfl:     LOSARTAN PO, Take  by mouth., Disp: , Rfl:     OXCARBAZEPINE PO, Take  by mouth., Disp: , Rfl:     LAB DATA REVIEWED:    Recent Results (from the past 24 hour(s))   CBC WITH AUTOMATED DIFF    Collection Time: 04/08/21  8:00 PM   Result Value Ref Range    WBC 12.3 (H) 3.6 - 11.0 K/uL    RBC 4.45 3.80 - 5.20 M/uL    HGB 10.6 (L) 11.5 - 16.0 g/dL    HCT 34.2 (L) 35.0 - 47.0 %    MCV 76.9 (L) 80.0 - 99.0 FL    MCH 23.8 (L) 26.0 - 34.0 PG    MCHC 31.0 30.0 - 36.5 g/dL    RDW 18.6 (H) 11.5 - 14.5 %    PLATELET 893 776 - 724 K/uL    MPV 10.7 8.9 - 12.9 FL    NRBC 0.0 0  WBC    ABSOLUTE NRBC 0.00 0.00 - 0.01 K/uL    NEUTROPHILS 92 (H) 32 - 75 %    LYMPHOCYTES 5 (L) 12 - 49 %    MONOCYTES 2 (L) 5 - 13 %    EOSINOPHILS 0 0 - 7 %    BASOPHILS 0 0 - 1 %    IMMATURE GRANULOCYTES 1 (H) 0.0 - 0.5 %    ABS. NEUTROPHILS 11.4 (H) 1.8 - 8.0 K/UL    ABS. LYMPHOCYTES 0.6 (L) 0.8 - 3.5 K/UL    ABS. MONOCYTES 0.3 0.0 - 1.0 K/UL    ABS. EOSINOPHILS 0.0 0.0 - 0.4 K/UL    ABS. BASOPHILS 0.0 0.0 - 0.1 K/UL    ABS. IMM.  GRANS. 0.1 (H) 0.00 - 0.04 K/UL    DF AUTOMATED     METABOLIC PANEL, COMPREHENSIVE    Collection Time: 04/08/21  8:00 PM   Result Value Ref Range    Sodium 140 136 - 145 mmol/L    Potassium 4.2 3.5 - 5.1 mmol/L    Chloride 105 97 - 108 mmol/L    CO2 28 21 - 32 mmol/L    Anion gap 7 5 - 15 mmol/L    Glucose 239 (H) 65 - 100 mg/dL    BUN 54 (H) 6 - 20 mg/dL    Creatinine 3.24 (H) 0.55 - 1.02 mg/dL    BUN/Creatinine ratio 17 12 - 20      GFR est AA 16 (L) >60 ml/min/1.73m2    GFR est non-AA 13 (L) >60 ml/min/1.73m2    Calcium 9.2 8.5 - 10.1 mg/dL    Bilirubin, total 0.4 0.2 - 1.0 mg/dL    AST (SGOT) 33 15 - 37 U/L    ALT (SGPT) 14 12 - 78 U/L    Alk.  phosphatase 53 45 - 117 U/L    Protein, total 7.5 6.4 - 8.2 g/dL    Albumin 2.6 (L) 3.5 - 5.0 g/dL    Globulin 4.9 (H) 2.0 - 4.0 g/dL    A-G Ratio 0.5 (L) 1.1 - 2.2     TROPONIN I    Collection Time: 04/08/21  8:00 PM   Result Value Ref Range    Troponin-I, Qt. 0.06 (H) <0.05 ng/mL   BNP    Collection Time: 04/08/21  8:00 PM   Result Value Ref Range    NT pro-BNP 1,559 (H) <450 pg/mL   LACTIC ACID    Collection Time: 04/08/21  8:00 PM   Result Value Ref Range    Lactic acid 1.4 0.4 - 2.0 mmol/L   OCCULT BLOOD, STOOL    Collection Time: 04/08/21  8:00 PM   Result Value Ref Range    Occult Blood,day 1 Negative Negative     URINALYSIS W/ REFLEX CULTURE    Collection Time: 04/08/21  8:00 PM    Specimen: Urine   Result Value Ref Range    Color Yellow      Appearance Turbid (A) Clear      Specific gravity 1.015 1.003 - 1.030      pH (UA) 5.0 5.0 - 8.0      Protein 100 (A) Negative mg/dL    Glucose Negative Negative mg/dL    Ketone Negative Negative mg/dL    Bilirubin Negative Negative      Blood Small (A) Negative      Urobilinogen 0.1 0.1 - 1.0 EU/dL    Nitrites Negative Negative      Leukocyte Esterase Trace (A) Negative      UA:UC IF INDICATED Urine Culture Ordered (A) Culture not indicated by UA result      WBC 20-50 0 - 4 /hpf    RBC 0-5 0 - 5 /hpf    Bacteria 4+ (A) Negative /hpf    PRESUMPTIVE YEAST Present      Other Urine Micro Present     SARS-COV-2    Collection Time: 04/09/21  4:28 AM   Result Value Ref Range    SARS-CoV-2 Please find results under separate order     COVID-19 RAPID TEST    Collection Time: 04/09/21  4:28 AM   Result Value Ref Range    Specimen source Nasopharyngeal      COVID-19 rapid test DETECTED (A) Not Detected     GLUCOSE, POC    Collection Time: 04/09/21  6:47 AM   Result Value Ref Range    Glucose (POC) 226 (H) 65 - 100 mg/dL    Performed by Ava ROPER        XR Results (most recent):  Results from Hospital Encounter encounter on 04/08/21   XR CHEST SNGL V    Narrative History: Sepsis? Single view of the chest was obtained. Heart size is enlarged. This is an  increased patchy airspace disease in the lung bases especially on the left  retrocardiac region. Upper lungs are clear. No effusions or pneumothorax. Bony  structures appear osteopenic. There are degenerative changes. No acute bony  abnormality. Pression: Changes consistent with left lower lobe infiltrate. Follow-up chest  x-rays recommended. XR CHEST SNGL V   Final Result           Review of Systems:  Patient not a good historian  Objective:   VITALS:    Visit Vitals  /63 (BP Patient Position: At rest;Supine)   Pulse 69   Temp 98 °F (36.7 °C)   Resp 20   Ht 5' 7\" (1.702 m)   Wt 63.5 kg (140 lb)   SpO2 100%   BMI 21.93 kg/m²       Physical Exam:   Constitutional: Awake  HENT:   Head: Normocephalic and atraumatic. Eyes: Pupils are equal, round, and reactive to light. EOM are normal.   Cardiovascular: Normal rate, regular rhythm and normal heart sounds. Pulmonary/Chest: Breath sounds normal. No wheezes. No rales. Exhibits no tenderness. Abdominal: Soft. Bowel sounds are normal. There is no abdominal tenderness. There is no rebound and no guarding. Musculoskeletal: Normal range of motion. Neurological: Patient awake    ASSESSMENT & PLAN:    COVID-19 pneumonitis  Chronic kidney disease  Hypertension  Insomnia      Current Facility-Administered Medications:     . PHARMACY TO SUBSTITUTE PER PROTOCOL (Reordered from: ergocalciferol, vitamin D2, (VITAMIN D2 PO)), , , Per Protocol, Kendal Haas MD    0.9% sodium chloride infusion, 50 mL/hr, IntraVENous, CONTINUOUS, Dominguez Garcia MD    acetaminophen (TYLENOL) tablet 650 mg, 650 mg, Oral, Q6H PRN **OR** acetaminophen (TYLENOL) suppository 650 mg, 650 mg, Rectal, Q6H PRN, Dominguez Garcia MD    polyethylene glycol (MIRALAX) packet 17 g, 17 g, Oral, DAILY PRN, Dominguez Garcia MD    ondansetron (ZOFRAN ODT) tablet 4 mg, 4 mg, Oral, Q8H PRN **OR** ondansetron (ZOFRAN) injection 4 mg, 4 mg, IntraVENous, Q6H PRN, Reynaldo Garcia MD    heparin (porcine) injection 5,000 Units, 5,000 Units, SubCUTAneous, Q8H, Reynaldo Garcia MD    dexamethasone (DECADRON) 4 mg/mL injection 4 mg, 4 mg, IntraVENous, Q8H, Reynaldo Garcia MD    azithromycin (ZITHROMAX) 500 mg in 0.9% sodium chloride 250 mL IVPB, 500 mg, IntraVENous, Q24H, Reynaldo Garcia MD    cefTRIAXone (ROCEPHIN) 1 g in sterile water (preservative free) 10 mL IV syringe, 1 g, IntraVENous, Q24H, Reynaldo Garcia MD    Current Outpatient Medications:     ergocalciferol, vitamin D2, (VITAMIN D2 PO), Take  by mouth., Disp: , Rfl:     trazodone HCl (TRAZODONE PO), Take  by mouth., Disp: , Rfl:     LOSARTAN PO, Take  by mouth., Disp: , Rfl:     OXCARBAZEPINE PO, Take  by mouth., Disp: , Rfl:       Patient admitted to telemetry floor  Pulmonary consult  Start on IV Zithromax and Rocephin  Start on IV Decadron  Repeat the labs in the morning  Heparin for DVT prophylaxis  Monitor renal function      ________________________________________________________________________    Signed: Rubalcava Longest, MD

## 2021-04-09 NOTE — PROGRESS NOTES
Admit Skin Assessment; Elderly patient with weakness, oriented only to self,skin is dry. Incontinent of urine with a wound to her buttocks. Pink in color, tender to the touch, no drainage. Saline lock to the right forearm, patent and flushes well, #20 gauge.

## 2021-04-09 NOTE — PROGRESS NOTES
Notified by nsg, patient is NPO pending swallow eval. However, no ST orders received at this time and regular diet ordered by MD. Discussed w/ nsg, rec nsg swallow screen and request swallow eval order from MD as medically indicated. ST requires orders signed or co-signed by MD, NP or PA.  Please place consult as medically indicated per MD.

## 2021-04-09 NOTE — CONSULTS
PULMONARY CONSULT  [facility]    Name: Andria Negron MRN: [id number]   : 1928 Hospital: [facility]   Date: 2021  Admission date: 2021 Hospital Day: 2       HPI:     Hospital Problems  Date Reviewed: 2020          Codes Class Noted POA    Pneumonia ICD-10-CM: J18.9  ICD-9-CM: 702  2021 Unknown        COVID-19 ICD-10-CM: U07.1  ICD-9-CM: 079.89  2021 Unknown                   [x] High complexity decision making was performed  [x] See my orders for details      Subjective/Initial History:     I was asked by Reyes Cough, MD to see Andria Negron  a 80 y.o.  female in consultation     Excerpts from admission 2021 or consult notes as follows:   72-year-old lady came in because of shortness of breath she is a nursing home resident past medical history of COVID-19 pneumonia hypertension hypothyroidism chronic kidney disease and insomnia. She was not feeling well Covid test came back positive chest x-ray shows left lower lobe infiltrate so admitted and pulmonary consult was called for further evaluation      No Known Allergies     MAR reviewed and pertinent medications noted or modified as needed     Current Facility-Administered Medications   Medication    . PHARMACY TO SUBSTITUTE PER PROTOCOL (Reordered from: ergocalciferol, vitamin D2, (VITAMIN D2 PO))    0.9% sodium chloride infusion    acetaminophen (TYLENOL) tablet 650 mg    Or    acetaminophen (TYLENOL) suppository 650 mg    polyethylene glycol (MIRALAX) packet 17 g    ondansetron (ZOFRAN ODT) tablet 4 mg    Or    ondansetron (ZOFRAN) injection 4 mg    heparin (porcine) injection 5,000 Units    dexamethasone (DECADRON) 4 mg/mL injection 4 mg    cefTRIAXone (ROCEPHIN) 1 g in sterile water (preservative free) 10 mL IV syringe    azithromycin (ZITHROMAX) 500 mg in 0.9% sodium chloride 250 mL (VIAL-MATE)      Patient PCP: Soledad, MD Calvin  PMH:  has a past medical history of Anemia, Chronic kidney disease, COVID-19, Hypertension, Hypothyroidism, and Insomnia. PSH:   has no past surgical history on file. FHX: Family history is unknown by patient. SHX:  reports that she has never smoked. She has never used smokeless tobacco.     ROS:  Unable to obtain      Objective:     Vital Signs: Telemetry:    normal sinus rhythm Intake/Output:   Visit Vitals  BP (!) 142/74   Pulse 67   Temp 98.3 °F (36.8 °C)   Resp 20   Ht 5' 7\" (1.702 m)   Wt 63.5 kg (140 lb)   SpO2 100%   BMI 21.93 kg/m²       Temp (24hrs), Av °F (37.2 °C), Min:98 °F (36.7 °C), Max:101.4 °F (38.6 °C)        O2 Device: Nasal cannula O2 Flow Rate (L/min): 4 l/min       Wt Readings from Last 4 Encounters:   21 63.5 kg (140 lb)   02/15/21 68 kg (150 lb)          Intake/Output Summary (Last 24 hours) at 2021 1053  Last data filed at 2021 0215  Gross per 24 hour   Intake 2058. 33 ml   Output    Net 2058. 33 ml       Last shift:      No intake/output data recorded. Last 3 shifts:  1901 -  0700  In: .3 [I.V.:.3]  Out: -        Physical Exam:     Physical Exam   Constitutional: She appears distressed. HENT:   Head: Normocephalic and atraumatic. Eyes: Pupils are equal, round, and reactive to light. Conjunctivae and EOM are normal.   Neck: Normal range of motion. Neck supple. Cardiovascular: Normal rate and regular rhythm. Pulmonary/Chest: Effort normal. She has rales. Musculoskeletal: Normal range of motion. Neurological: She is alert. Labs:    Recent Labs     21   WBC 12.3*   HGB 10.6*        Recent Labs     21      K 4.2      CO2 28   *   BUN 54*   CREA 3.24*   CA 9.2   LAC 1.4   ALB 2.6*   ALT 14     No results for input(s): PH, PCO2, PO2, HCO3, FIO2 in the last 72 hours.   Recent Labs     21   TROIQ 0.06*     No results found for: BNPP, BNP   No results found for: CULTNo results found for: TSH, TSHEXT    Imaging:    CXR Results  (Last 48 hours)               04/08/21 2000  XR CHEST SNGL V Final result    Narrative:  History: Sepsis? Single view of the chest was obtained. Heart size is enlarged. This is an   increased patchy airspace disease in the lung bases especially on the left   retrocardiac region. Upper lungs are clear. No effusions or pneumothorax. Bony   structures appear osteopenic. There are degenerative changes. No acute bony   abnormality. Pression: Changes consistent with left lower lobe infiltrate. Follow-up chest   x-rays recommended. Results from Hospital Encounter encounter on 04/08/21   XR CHEST SNGL V    Narrative History: Sepsis? Single view of the chest was obtained. Heart size is enlarged. This is an  increased patchy airspace disease in the lung bases especially on the left  retrocardiac region. Upper lungs are clear. No effusions or pneumothorax. Bony  structures appear osteopenic. There are degenerative changes. No acute bony  abnormality. Pression: Changes consistent with left lower lobe infiltrate. Follow-up chest  x-rays recommended. Results from [place] encounter on 02/15/21   CT SPINE CERV WO CONT    Narrative Fall. Comparison CT of the cervical spine 6/13/2016. Technique: Axial images cervical spine with sagittal and coronal reformats. 3-D  MIPs. Dose reduction: All CT scans at this facility are performed using dose reduction  optimization techniques as appropriate to a performed exam including the  following: Automated exposure control, adjustments of the mA and/or kV according  to patient's size, or use of iterative reconstruction technique. FINDINGS: The bones are demineralized which limits evaluation for fracture. No  listhesis. C6, C7, T1 vertebral body compressions are unchanged. T4 vertebral  body almost complete compression appears nonacute but has occurred in the  interim.  No evident acute fracture, jumped or perched facet. Multilevel disc  greater than placenta narrowing, sclerosis, osteophytosis. 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 Lateral pillars similar alignment as previous. No prevertebral soft tissue  swelling. Lung apices clear. Impression 1. No acute bony findings. 2. T4 vertebral body compression has occurred in the interim but appears  nonacute. Correlate for any focal pain. IMPRESSION:   1. Acute hypoxic respiratory failure  2. COVID-19 pneumonia  3. Chronic kidney disease  4. Hypertension and insomnia by history  5. Pt is requiring Drug therapy requiring intensive monitoring for toxicity  6. Pt is unstable, unpredictable needing inpatient monitoring; is acutely ill and at high risk of sudden decline and decompensation with severe consequenses and continued end organ dysfunction and failure  7. Prognosis guarded       RECOMMENDATIONS/PLAN:     1. Oxygen via  nasal Cannula oxygen as salvage oxygen delivery device to provide high concentration of oxygen to overcome refractory hypoxia;  2. Patient is on Decadron Zithromax and Rocephin  3. Chest x-ray shows left lower lobe infiltrate  4. Continue with IV fluid we will repeat renal function in a.m.  5. Supplemental O2 to keep sats > 93%  6. Aspiration precautions  7. Labs to follow electrolytes, renal function and and blood counts  8. Glucose monitoring and SSI  9. Bronchial hygiene with respiratory therapy techniques, bronchodilators  10.  DVT, SUP prophylaxis         Ramakrishna Gonsales MD

## 2021-04-10 LAB
ALBUMIN SERPL-MCNC: 2.4 G/DL (ref 3.5–5)
ALBUMIN/GLOB SERPL: 0.5 {RATIO} (ref 1.1–2.2)
ALP SERPL-CCNC: 45 U/L (ref 45–117)
ALT SERPL-CCNC: 14 U/L (ref 12–78)
ANION GAP SERPL CALC-SCNC: 8 MMOL/L (ref 5–15)
AST SERPL W P-5'-P-CCNC: 23 U/L (ref 15–37)
BACTERIA SPEC CULT: NORMAL
BASOPHILS # BLD: 0 K/UL (ref 0–0.1)
BASOPHILS NFR BLD: 0 % (ref 0–1)
BILIRUB SERPL-MCNC: 0.2 MG/DL (ref 0.2–1)
BUN SERPL-MCNC: 63 MG/DL (ref 6–20)
BUN/CREAT SERPL: 20 (ref 12–20)
CA-I BLD-MCNC: 8.9 MG/DL (ref 8.5–10.1)
CHLORIDE SERPL-SCNC: 110 MMOL/L (ref 97–108)
CO2 SERPL-SCNC: 24 MMOL/L (ref 21–32)
CREAT SERPL-MCNC: 3.09 MG/DL (ref 0.55–1.02)
DIFFERENTIAL METHOD BLD: ABNORMAL
EOSINOPHIL # BLD: 0 K/UL (ref 0–0.4)
EOSINOPHIL NFR BLD: 0 % (ref 0–7)
ERYTHROCYTE [DISTWIDTH] IN BLOOD BY AUTOMATED COUNT: 19 % (ref 11.5–14.5)
GLOBULIN SER CALC-MCNC: 4.6 G/DL (ref 2–4)
GLUCOSE BLD STRIP.AUTO-MCNC: 178 MG/DL (ref 65–100)
GLUCOSE BLD STRIP.AUTO-MCNC: 266 MG/DL (ref 65–100)
GLUCOSE BLD STRIP.AUTO-MCNC: 280 MG/DL (ref 65–100)
GLUCOSE BLD STRIP.AUTO-MCNC: 366 MG/DL (ref 65–100)
GLUCOSE SERPL-MCNC: 169 MG/DL (ref 65–100)
HCT VFR BLD AUTO: 31.7 % (ref 35–47)
HGB BLD-MCNC: 9.7 G/DL (ref 11.5–16)
IMM GRANULOCYTES # BLD AUTO: 0.1 K/UL (ref 0–0.04)
IMM GRANULOCYTES NFR BLD AUTO: 1 % (ref 0–0.5)
LYMPHOCYTES # BLD: 0.5 K/UL (ref 0.8–3.5)
LYMPHOCYTES NFR BLD: 4 % (ref 12–49)
MCH RBC QN AUTO: 23.4 PG (ref 26–34)
MCHC RBC AUTO-ENTMCNC: 30.6 G/DL (ref 30–36.5)
MCV RBC AUTO: 76.6 FL (ref 80–99)
MONOCYTES # BLD: 0.4 K/UL (ref 0–1)
MONOCYTES NFR BLD: 4 % (ref 5–13)
NEUTS SEG # BLD: 10.3 K/UL (ref 1.8–8)
NEUTS SEG NFR BLD: 91 % (ref 32–75)
PERFORMED BY, TECHID: ABNORMAL
PLATELET # BLD AUTO: 355 K/UL (ref 150–400)
PMV BLD AUTO: 10.6 FL (ref 8.9–12.9)
POTASSIUM SERPL-SCNC: 4.4 MMOL/L (ref 3.5–5.1)
PROT SERPL-MCNC: 7 G/DL (ref 6.4–8.2)
RBC # BLD AUTO: 4.14 M/UL (ref 3.8–5.2)
SODIUM SERPL-SCNC: 142 MMOL/L (ref 136–145)
SPECIAL REQUESTS,SREQ: NORMAL
WBC # BLD AUTO: 11.2 K/UL (ref 3.6–11)

## 2021-04-10 PROCEDURE — 74011250636 HC RX REV CODE- 250/636: Performed by: FAMILY MEDICINE

## 2021-04-10 PROCEDURE — 36415 COLL VENOUS BLD VENIPUNCTURE: CPT

## 2021-04-10 PROCEDURE — 65270000029 HC RM PRIVATE

## 2021-04-10 PROCEDURE — 94762 N-INVAS EAR/PLS OXIMTRY CONT: CPT

## 2021-04-10 PROCEDURE — 74011000250 HC RX REV CODE- 250: Performed by: FAMILY MEDICINE

## 2021-04-10 PROCEDURE — 85025 COMPLETE CBC W/AUTO DIFF WBC: CPT

## 2021-04-10 PROCEDURE — 80053 COMPREHEN METABOLIC PANEL: CPT

## 2021-04-10 PROCEDURE — 77010033678 HC OXYGEN DAILY

## 2021-04-10 PROCEDURE — 82962 GLUCOSE BLOOD TEST: CPT

## 2021-04-10 RX ADMIN — DEXAMETHASONE SODIUM PHOSPHATE 4 MG: 4 INJECTION, SOLUTION INTRAMUSCULAR; INTRAVENOUS at 22:44

## 2021-04-10 RX ADMIN — CEFTRIAXONE SODIUM 1 G: 1 INJECTION, POWDER, FOR SOLUTION INTRAMUSCULAR; INTRAVENOUS at 13:07

## 2021-04-10 RX ADMIN — HEPARIN SODIUM 5000 UNITS: 5000 INJECTION INTRAVENOUS; SUBCUTANEOUS at 15:12

## 2021-04-10 RX ADMIN — SODIUM CHLORIDE 50 ML/HR: 9 INJECTION, SOLUTION INTRAVENOUS at 13:08

## 2021-04-10 RX ADMIN — DEXAMETHASONE SODIUM PHOSPHATE 4 MG: 4 INJECTION, SOLUTION INTRAMUSCULAR; INTRAVENOUS at 05:13

## 2021-04-10 RX ADMIN — AZITHROMYCIN DIHYDRATE 500 MG: 500 INJECTION, POWDER, LYOPHILIZED, FOR SOLUTION INTRAVENOUS at 13:07

## 2021-04-10 RX ADMIN — DEXAMETHASONE SODIUM PHOSPHATE 4 MG: 4 INJECTION, SOLUTION INTRAMUSCULAR; INTRAVENOUS at 15:13

## 2021-04-10 RX ADMIN — HEPARIN SODIUM 5000 UNITS: 5000 INJECTION INTRAVENOUS; SUBCUTANEOUS at 22:44

## 2021-04-10 RX ADMIN — HEPARIN SODIUM 5000 UNITS: 5000 INJECTION INTRAVENOUS; SUBCUTANEOUS at 05:13

## 2021-04-10 NOTE — CONSULTS
PULMONARY NOTE  [facility]    Name: Josie Cleveland MRN: [id number]   : 1928 Hospital: [address] [place] [address]   Date: 4/10/2021  Admission date: 2021 Hospital Day: 3       HPI:     Hospital Problems  Date Reviewed: 2020          Codes Class Noted POA    Pneumonia ICD-10-CM: J18.9  ICD-9-CM: 924  2021 Unknown        COVID-19 ICD-10-CM: U07.1  ICD-9-CM: 079.89  2021 Unknown                   [x] High complexity decision making was performed  [x] See my orders for details      Subjective/Initial History:     I was asked by Hood Anderson MD to see Josie Cleveland  a 80 y.o.  female in consultation     Excerpts from admission 2021 or consult notes as follows:   80-year-old lady came in because of shortness of breath she is a nursing home resident past medical history of COVID-19 pneumonia hypertension hypothyroidism chronic kidney disease and insomnia. She was not feeling well Covid test came back positive chest x-ray shows left lower lobe infiltrate so admitted and pulmonary consult was called for further evaluation      No Known Allergies     MAR reviewed and pertinent medications noted or modified as needed     Current Facility-Administered Medications   Medication    0.9% sodium chloride infusion    acetaminophen (TYLENOL) tablet 650 mg    Or    acetaminophen (TYLENOL) suppository 650 mg    polyethylene glycol (MIRALAX) packet 17 g    ondansetron (ZOFRAN ODT) tablet 4 mg    Or    ondansetron (ZOFRAN) injection 4 mg    heparin (porcine) injection 5,000 Units    dexamethasone (DECADRON) 4 mg/mL injection 4 mg    cefTRIAXone (ROCEPHIN) 1 g in sterile water (preservative free) 10 mL IV syringe    azithromycin (ZITHROMAX) 500 mg in 0.9% sodium chloride 250 mL (VIAL-MATE)      Patient PCP: Other, MD Calvin  PMH:  has a past medical history of Anemia, Chronic kidney disease, COVID-19, Hypertension, Hypothyroidism, and Insomnia.   PSH:   has no past surgical history on file. FHX: Family history is unknown by patient. SHX:  reports that she has never smoked. She has never used smokeless tobacco.     ROS:  Unable to obtain      Objective:     Vital Signs: Telemetry:    normal sinus rhythm Intake/Output:   Visit Vitals  /62   Pulse 74   Temp 98.2 °F (36.8 °C)   Resp 18   Ht 5' 7\" (1.702 m)   Wt 63.5 kg (140 lb)   SpO2 100%   Breastfeeding Unknown   BMI 21.93 kg/m²       Temp (24hrs), Av.1 °F (36.7 °C), Min:97.6 °F (36.4 °C), Max:98.3 °F (36.8 °C)        O2 Device: Nasal cannula O2 Flow Rate (L/min): 4 l/min       Wt Readings from Last 4 Encounters:   21 63.5 kg (140 lb)   02/15/21 68 kg (150 lb)        No intake or output data in the 24 hours ending 04/10/21 1016    Last shift:      No intake/output data recorded. Last 3 shifts: 1901 - 04/10 0700  In: .3 [I.V.:.3]  Out: -        Physical Exam:     Physical Exam   Constitutional: She appears distressed. HENT:   Head: Normocephalic and atraumatic. Eyes: Pupils are equal, round, and reactive to light. Conjunctivae and EOM are normal.   Neck: Normal range of motion. Neck supple. Cardiovascular: Normal rate and regular rhythm. Pulmonary/Chest: Effort normal. She has rales. Musculoskeletal: Normal range of motion. Neurological: She is alert. Labs:    Recent Labs     04/10/21  0645 04/08/21  2000   WBC 11.2* 12.3*   HGB 9.7* 10.6*    288     Recent Labs     04/10/21  0645 04/08/21  2000    140   K 4.4 4.2   * 105   CO2 24 28   * 239*   BUN 63* 54*   CREA 3.09* 3.24*   CA 8.9 9.2   LAC  --  1.4   ALB 2.4* 2.6*   ALT 14 14     No results for input(s): PH, PCO2, PO2, HCO3, FIO2 in the last 72 hours.   Recent Labs     21   TROIQ 0.06*     No results found for: BNPP, BNP   No results found for: CULTNo results found for: TSH, TSHEXT, TSHEXT    Imaging:    CXR Results  (Last 48 hours)               21  XR CHEST SNGL V Final result    Narrative:  History: Sepsis? Single view of the chest was obtained. Heart size is enlarged. This is an   increased patchy airspace disease in the lung bases especially on the left   retrocardiac region. Upper lungs are clear. No effusions or pneumothorax. Bony   structures appear osteopenic. There are degenerative changes. No acute bony   abnormality. Pression: Changes consistent with left lower lobe infiltrate. Follow-up chest   x-rays recommended. Results from Hospital Encounter encounter on 04/08/21   XR CHEST SNGL V    Narrative History: Sepsis? Single view of the chest was obtained. Heart size is enlarged. This is an  increased patchy airspace disease in the lung bases especially on the left  retrocardiac region. Upper lungs are clear. No effusions or pneumothorax. Bony  structures appear osteopenic. There are degenerative changes. No acute bony  abnormality. Pression: Changes consistent with left lower lobe infiltrate. Follow-up chest  x-rays recommended. Results from [place] encounter on 02/15/21   CT SPINE CERV WO CONT    Narrative Fall. Comparison CT of the cervical spine 6/13/2016. Technique: Axial images cervical spine with sagittal and coronal reformats. 3-D  MIPs. Dose reduction: All CT scans at this facility are performed using dose reduction  optimization techniques as appropriate to a performed exam including the  following: Automated exposure control, adjustments of the mA and/or kV according  to patient's size, or use of iterative reconstruction technique. FINDINGS: The bones are demineralized which limits evaluation for fracture. No  listhesis. C6, C7, T1 vertebral body compressions are unchanged. T4 vertebral  body almost complete compression appears nonacute but has occurred in the  interim. No evident acute fracture, jumped or perched facet. Multilevel disc  greater than placenta narrowing, sclerosis, osteophytosis.  Anterior fusions from  1 [place]il[place]. Lateral pillars similar alignment as previous. No prevertebral soft tissue  swelling. Lung apices clear. Impression 1. No acute bony findings. 2. T4 vertebral body compression has occurred in the interim but appears  nonacute. Correlate for any focal pain. IMPRESSION:   1. Acute hypoxic respiratory failure  2. COVID-19 pneumonia  3. Chronic kidney disease  4. Hypertension and insomnia by history  5. Pt is requiring Drug therapy requiring intensive monitoring for toxicity  6. Prognosis guarded       RECOMMENDATIONS/PLAN:     1. Oxygen via  nasal Cannula oxygen as salvage oxygen delivery device to provide high concentration of oxygen to overcome refractory hypoxia;  2. Patient is on Decadron Zithromax  Rocephin and heparin  3. Chest x-ray shows left lower lobe infiltrate  4.  Continue with IV fluid we will get nephrology consult         Antoine Hernandez MD

## 2021-04-10 NOTE — CONSULTS
Renal Consultation Note    NAME:  Ananya Apodaca   :   1928   MRN:   [id number]     ATTENDING: Gladys Bains MD  PCP:  Yulia Villalta MD    Date/Time:  4/10/2021 2:25 PM      Subjective:   REQUESTING PHYSICIAN: Dr Harvey Hamlin:    zaheer on ckd    Patient is a 12-year-old Afro-American female with history of chronic kidney disease stage III with baseline creatinine around 1.8, hypertension, past history of Covid pneumonia and hypothyroidism who presented from the nursing home because of shortness of breath. Apparently she was not feeling well and her Covid test came back positive. Her chest x-ray showed left lower lobe infiltrate for which she has been admitted. Creatinine was elevated at 2.2 on admission which prompted a renal consultation. Creatinine has improved marginally to 3.0 today. Her last renal function on 2/15/2021 showed a creatinine of 1.8 with a BUN of 38. Patient was noted to be on losartan at home  She is currently on isotonic saline at 50 mils per hour. BNP was elevated at 1300 but patient herself does not seem to be volume overloaded    Past Medical History:   Diagnosis Date    Anemia     Chronic kidney disease     COVID-19     Hypertension     Hypothyroidism     Insomnia       History reviewed. No pertinent surgical history. Social History     Tobacco Use    Smoking status: Never Smoker    Smokeless tobacco: Never Used   Substance Use Topics    Alcohol use: Not on file      Family History   Family history unknown: Yes       No Known Allergies   Prior to Admission medications    Medication Sig Start Date End Date Taking? Authorizing Provider   ergocalciferol, vitamin D2, (VITAMIN D2 PO) Take  by mouth. Provider, Historical   trazodone HCl (TRAZODONE PO) Take  by mouth. Provider, Historical   LOSARTAN PO Take  by mouth. Provider, Historical   OXCARBAZEPINE PO Take  by mouth.     Provider, Historical       REVIEW OF SYSTEMS:       Constitutional: Negative for chills and fever. HENT: Negative. Eyes: Negative. Respiratory: As above   cardiovascular: Negative. Gastrointestinal: Negative for abdominal pain and nausea. Skin: Negative. Neurological: Negative. Objective:   VITALS:    Visit Vitals  /62   Pulse 74   Temp 98.2 °F (36.8 °C)   Resp 18   Ht 5' 7\" (1.702 m)   Wt 63.5 kg (140 lb)   SpO2 97%   Breastfeeding Unknown   BMI 21.93 kg/m²     Temp (24hrs), Av °F (36.7 °C), Min:97.6 °F (36.4 °C), Max:98.2 °F (36.8 °C)      PHYSICAL EXAM:   General:    Alert, cooperative, no distress, appears stated age. HEENT: Atraumatic, anicteric sclerae, pink conjunctivae     No oral ulcers, mucosa moist, throat clear  Neck:  Supple, symmetrical,  thyroid: non tender  Lungs:   No Wheezing or Rhonchi. No rales. Chest wall:  No tenderness  No Accessory muscle use. Heart:   Regular  rhythm,  No  murmur   No edema  Abdomen:   Soft, non-tender. Not distended. Bowel sounds normal  Extremities: No cyanosis. No clubbing  Skin:     Not pale. Not Jaundiced  No rashes   Psych:  Good insight. Not depressed. Not anxious or agitated.   Neurologic: Alert     LAB DATA REVIEWED:    Recent Results (from the past 24 hour(s))   GLUCOSE, POC    Collection Time: 21  2:41 PM   Result Value Ref Range    Glucose (POC) 145 (H) 65 - 100 mg/dL    Performed by FIDELINA LOUIS    GLUCOSE, POC    Collection Time: 21  7:48 PM   Result Value Ref Range    Glucose (POC) 126 (H) 65 - 100 mg/dL    Performed by Jhonathan Knuckles    METABOLIC PANEL, COMPREHENSIVE    Collection Time: 04/10/21  6:45 AM   Result Value Ref Range    Sodium 142 136 - 145 mmol/L    Potassium 4.4 3.5 - 5.1 mmol/L    Chloride 110 (H) 97 - 108 mmol/L    CO2 24 21 - 32 mmol/L    Anion gap 8 5 - 15 mmol/L    Glucose 169 (H) 65 - 100 mg/dL    BUN 63 (H) 6 - 20 mg/dL    Creatinine 3.09 (H) 0.55 - 1.02 mg/dL    BUN/Creatinine ratio 20 12 - 20      GFR est AA 17 (L) >60 ml/min/1.73m2    GFR est non-AA 14 (L) >60 ml/min/1.73m2    Calcium 8.9 8.5 - 10.1 mg/dL    Bilirubin, total 0.2 0.2 - 1.0 mg/dL    AST (SGOT) 23 15 - 37 U/L    ALT (SGPT) 14 12 - 78 U/L    Alk. phosphatase 45 45 - 117 U/L    Protein, total 7.0 6.4 - 8.2 g/dL    Albumin 2.4 (L) 3.5 - 5.0 g/dL    Globulin 4.6 (H) 2.0 - 4.0 g/dL    A-G Ratio 0.5 (L) 1.1 - 2.2     CBC WITH AUTOMATED DIFF    Collection Time: 04/10/21  6:45 AM   Result Value Ref Range    WBC 11.2 (H) 3.6 - 11.0 K/uL    RBC 4.14 3.80 - 5.20 M/uL    HGB 9.7 (L) 11.5 - 16.0 g/dL    HCT 31.7 (L) 35.0 - 47.0 %    MCV 76.6 (L) 80.0 - 99.0 FL    MCH 23.4 (L) 26.0 - 34.0 PG    MCHC 30.6 30.0 - 36.5 g/dL    RDW 19.0 (H) 11.5 - 14.5 %    PLATELET 102 546 - 335 K/uL    MPV 10.6 8.9 - 12.9 FL    NEUTROPHILS 91 (H) 32 - 75 %    LYMPHOCYTES 4 (L) 12 - 49 %    MONOCYTES 4 (L) 5 - 13 %    EOSINOPHILS 0 0 - 7 %    BASOPHILS 0 0 - 1 %    IMMATURE GRANULOCYTES 1 (H) 0.0 - 0.5 %    ABS. NEUTROPHILS 10.3 (H) 1.8 - 8.0 K/UL    ABS. LYMPHOCYTES 0.5 (L) 0.8 - 3.5 K/UL    ABS. MONOCYTES 0.4 0.0 - 1.0 K/UL    ABS. EOSINOPHILS 0.0 0.0 - 0.4 K/UL    ABS. BASOPHILS 0.0 0.0 - 0.1 K/UL    ABS. IMM. GRANS. 0.1 (H) 0.00 - 0.04 K/UL    DF AUTOMATED     GLUCOSE, POC    Collection Time: 04/10/21  8:32 AM   Result Value Ref Range    Glucose (POC) 178 (H) 65 - 100 mg/dL    Performed by Alida Silver ([place])    GLUCOSE, POC    Collection Time: 04/10/21 11:07 AM   Result Value Ref Range    Glucose (POC) 280 (H) 65 - 100 mg/dL    Performed by Alida Silver [facility])        Recommendations/Plan:   #1 acute kidney injury on CKD 3  -He presented with a creatinine of 3.2 which is slightly improved to 3.0 today  -Baseline creatinine seems to be 1.8 based on labs in February 2021  -TAMIKO could be prerenal secondary to losartan outpatient which I will hold  -Agree with IV fluids. I will continue IV fluids at 50 mils per hour.   BNP is elevated but patient herself seems to be in no volume overload  -Check renal ultrasound on Monday  -Urine is lai.  Will quantify proteinuria  -Check CPK to rule out rhabdo    #2 Hypertension  -Blood pressure is well controlled. Continue to hold losartan  -Continue as needed medications for blood pressure control. Currently not needed  -Continue IV fluids    #3 anemia  -Hemoglobin 9.3. Likely anemia of chronic kidney disease.   -I will check iron studies and will start her on weekly Procrit    #4 Covid pneumonia  -Patient presented with shortness of breath. Covid positive  -Has been started on dexamethasone azithromycin and Rocephin  -no respiratory distress for now    #5 renal osteodystrophy  -Check intact parathyroid hormone and 25-hydroxy vitamin D level.    -Calcium is okay we will check phosphorus level    #6 UTI  -Urine is suggestive of UTI.   She has been started on Rocephin    Thank you for the consultation        ________________________________________________________________________  Signed: Michael Thompson MD

## 2021-04-11 LAB
ALBUMIN SERPL-MCNC: 2.3 G/DL (ref 3.5–5)
ANION GAP SERPL CALC-SCNC: 9 MMOL/L (ref 5–15)
BUN SERPL-MCNC: 63 MG/DL (ref 6–20)
BUN/CREAT SERPL: 21 (ref 12–20)
CA-I BLD-MCNC: 8.4 MG/DL (ref 8.5–10.1)
CHLORIDE SERPL-SCNC: 112 MMOL/L (ref 97–108)
CK SERPL-CCNC: 140 U/L (ref 26–192)
CO2 SERPL-SCNC: 23 MMOL/L (ref 21–32)
CREAT SERPL-MCNC: 3.01 MG/DL (ref 0.55–1.02)
CREAT UR-MCNC: 41 MG/DL
GLUCOSE SERPL-MCNC: 188 MG/DL (ref 65–100)
PHOSPHATE SERPL-MCNC: 3.3 MG/DL (ref 2.6–4.7)
POTASSIUM SERPL-SCNC: 4.6 MMOL/L (ref 3.5–5.1)
PROT UR-MCNC: 98 MG/DL (ref 0–11.9)
PROT/CREAT UR-RTO: 2.4
SODIUM SERPL-SCNC: 144 MMOL/L (ref 136–145)

## 2021-04-11 PROCEDURE — 82306 VITAMIN D 25 HYDROXY: CPT

## 2021-04-11 PROCEDURE — 74011000250 HC RX REV CODE- 250: Performed by: FAMILY MEDICINE

## 2021-04-11 PROCEDURE — 80069 RENAL FUNCTION PANEL: CPT

## 2021-04-11 PROCEDURE — 74011250636 HC RX REV CODE- 250/636: Performed by: INTERNAL MEDICINE

## 2021-04-11 PROCEDURE — 74011250636 HC RX REV CODE- 250/636: Performed by: FAMILY MEDICINE

## 2021-04-11 PROCEDURE — 65270000029 HC RM PRIVATE

## 2021-04-11 PROCEDURE — 83970 ASSAY OF PARATHORMONE: CPT

## 2021-04-11 PROCEDURE — 94762 N-INVAS EAR/PLS OXIMTRY CONT: CPT

## 2021-04-11 PROCEDURE — 82550 ASSAY OF CK (CPK): CPT

## 2021-04-11 PROCEDURE — 84156 ASSAY OF PROTEIN URINE: CPT

## 2021-04-11 PROCEDURE — 77010033678 HC OXYGEN DAILY

## 2021-04-11 RX ADMIN — AZITHROMYCIN DIHYDRATE 500 MG: 500 INJECTION, POWDER, LYOPHILIZED, FOR SOLUTION INTRAVENOUS at 09:27

## 2021-04-11 RX ADMIN — SODIUM CHLORIDE 50 ML/HR: 9 INJECTION, SOLUTION INTRAVENOUS at 10:08

## 2021-04-11 RX ADMIN — HEPARIN SODIUM 5000 UNITS: 5000 INJECTION INTRAVENOUS; SUBCUTANEOUS at 14:07

## 2021-04-11 RX ADMIN — CEFTRIAXONE SODIUM 1 G: 1 INJECTION, POWDER, FOR SOLUTION INTRAMUSCULAR; INTRAVENOUS at 09:27

## 2021-04-11 RX ADMIN — HEPARIN SODIUM 5000 UNITS: 5000 INJECTION INTRAVENOUS; SUBCUTANEOUS at 22:23

## 2021-04-11 RX ADMIN — HEPARIN SODIUM 5000 UNITS: 5000 INJECTION INTRAVENOUS; SUBCUTANEOUS at 05:28

## 2021-04-11 RX ADMIN — DEXAMETHASONE SODIUM PHOSPHATE 4 MG: 4 INJECTION, SOLUTION INTRAMUSCULAR; INTRAVENOUS at 05:28

## 2021-04-11 RX ADMIN — DEXAMETHASONE SODIUM PHOSPHATE 4 MG: 4 INJECTION, SOLUTION INTRAMUSCULAR; INTRAVENOUS at 22:23

## 2021-04-11 RX ADMIN — DEXAMETHASONE SODIUM PHOSPHATE 4 MG: 4 INJECTION, SOLUTION INTRAMUSCULAR; INTRAVENOUS at 14:07

## 2021-04-11 RX ADMIN — SODIUM CHLORIDE 100 ML/HR: 9 INJECTION, SOLUTION INTRAVENOUS at 22:24

## 2021-04-11 NOTE — CONSULTS
PULMONARY NOTE  [facility]    Name: Estelita Anthony MRN: [id number]   : 1928 Hospital: [facility]   Date: 2021  Admission date: 2021 Hospital Day: 4       HPI:     Hospital Problems  Date Reviewed: 2020          Codes Class Noted POA    Pneumonia ICD-10-CM: J18.9  ICD-9-CM: 369  2021 Unknown        COVID-19 ICD-10-CM: U07.1  ICD-9-CM: 079.89  2021 Unknown                   [x] High complexity decision making was performed  [x] See my orders for details      Subjective/Initial History:     I was asked by Elsie Rivas MD to see Estelita Anthony  a 80 y.o.  female in consultation     Excerpts from admission 2021 or consult notes as follows:   60-year-old lady came in because of shortness of breath she is a nursing home resident past medical history of COVID-19 pneumonia hypertension hypothyroidism chronic kidney disease and insomnia. She was not feeling well Covid test came back positive chest x-ray shows left lower lobe infiltrate so admitted and pulmonary consult was called for further evaluation      No Known Allergies     MAR reviewed and pertinent medications noted or modified as needed     Current Facility-Administered Medications   Medication    0.9% sodium chloride infusion    acetaminophen (TYLENOL) tablet 650 mg    Or    acetaminophen (TYLENOL) suppository 650 mg    polyethylene glycol (MIRALAX) packet 17 g    ondansetron (ZOFRAN ODT) tablet 4 mg    Or    ondansetron (ZOFRAN) injection 4 mg    heparin (porcine) injection 5,000 Units    dexamethasone (DECADRON) 4 mg/mL injection 4 mg    cefTRIAXone (ROCEPHIN) 1 g in sterile water (preservative free) 10 mL IV syringe    azithromycin (ZITHROMAX) 500 mg in 0.9% sodium chloride 250 mL (VIAL-MATE)      Patient PCP: Soledad, MD Calvin  PMH:  has a past medical history of Anemia, Chronic kidney disease, COVID-19, Hypertension, Hypothyroidism, and Insomnia.   PSH:   has no past surgical history on file. FHX: Family history is unknown by patient. SHX:  reports that she has never smoked. She has never used smokeless tobacco.     ROS:  Unable to obtain      Objective:     Vital Signs: Telemetry:    normal sinus rhythm Intake/Output:   Visit Vitals  BP (!) 152/75   Pulse 70   Temp 98.3 °F (36.8 °C)   Resp 20   Ht 5' 7\" (1.702 m)   Wt 63.5 kg (140 lb)   SpO2 100%   Breastfeeding Unknown   BMI 21.93 kg/m²       Temp (24hrs), Av.9 °F (36.6 °C), Min:97.4 °F (36.3 °C), Max:98.3 °F (36.8 °C)        O2 Device: None (Room air) O2 Flow Rate (L/min): 1 l/min       Wt Readings from Last 4 Encounters:   21 63.5 kg (140 lb)   02/15/21 68 kg (150 lb)        No intake or output data in the 24 hours ending 21 1022    Last shift:      No intake/output data recorded. Last 3 shifts: No intake/output data recorded. Physical Exam:     Physical Exam   Constitutional: She appears distressed. HENT:   Head: Normocephalic and atraumatic. Eyes: Pupils are equal, round, and reactive to light. Conjunctivae and EOM are normal.   Neck: Normal range of motion. Neck supple. Cardiovascular: Normal rate and regular rhythm. Pulmonary/Chest: Effort normal. She has rales. Musculoskeletal: Normal range of motion. Neurological: She is alert. Labs:    Recent Labs     04/10/21  0645 21   WBC 11.2* 12.3*   HGB 9.7* 10.6*    288     Recent Labs     21  0705 04/10/21  0645 21    142 140   K 4.6 4.4 4.2   * 110* 105   CO2 23 24 28   * 169* 239*   BUN 63* 63* 54*   CREA 3.01* 3.09* 3.24*   CA 8.4* 8.9 9.2   PHOS 3.3  --   --    LAC  --   --  1.4   ALB 2.3* 2.4* 2.6*   ALT  --  14 14     No results for input(s): PH, PCO2, PO2, HCO3, FIO2 in the last 72 hours.   Recent Labs     21  0705 21     --    TROIQ  --  0.06*     No results found for: BNPP, BNP   Lab Results   Component Value Date/Time    Culture result: No Growth (<1000 cfu/mL) 04/08/2021 08:00 PM   No results found for: TSH, TSHEXT, TSHEXT    Imaging:    CXR Results  (Last 48 hours)    None        Results from [place] encounter on 04/08/21   XR CHEST SNGL V    Narrative History: Sepsis? Single view of the chest was obtained. Heart size is enlarged. This is an  increased patchy airspace disease in the lung bases especially on the left  retrocardiac region. Upper lungs are clear. No effusions or pneumothorax. Bony  structures appear osteopenic. There are degenerative changes. No acute bony  abnormality. Pression: Changes consistent with left lower lobe infiltrate. Follow-up chest  x-rays recommended. Results from [place] encounter on 02/15/21   CT SPINE CERV WO CONT    Narrative Fall. Comparison CT of the cervical spine 6/13/2016. Technique: Axial images cervical spine with sagittal and coronal reformats. 3-D  MIPs. Dose reduction: All CT scans at this facility are performed using dose reduction  optimization techniques as appropriate to a performed exam including the  following: Automated exposure control, adjustments of the mA and/or kV according  to patient's size, or use of iterative reconstruction technique. FINDINGS: The bones are demineralized which limits evaluation for fracture. No  listhesis. C6, C7, T1 vertebral body compressions are unchanged. T4 vertebral  body almost complete compression appears nonacute but has occurred in the  interim. No evident acute fracture, jumped or perched facet. Multilevel disc  greater than placenta narrowing, sclerosis, osteophytosis. 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 Lateral pillars similar alignment as previous. No prevertebral soft tissue  swelling. Lung apices clear. Impression 1. No acute bony findings. 2. T4 vertebral body compression has occurred in the interim but appears  nonacute. Correlate for any focal pain. IMPRESSION:   1.  Acute hypoxic respiratory failure  2. COVID-19 pneumonia  3. Chronic kidney disease  4. Hypertension and insomnia by history  5. Prognosis guarded       RECOMMENDATIONS/PLAN:     1. Oxygen via nasal Cannula will start weaning  2. Patient is on Decadron Zithromax  Rocephin and heparin  3. Chest x-ray shows left lower lobe infiltrate  4.  Continue with IV fluid          Justin Del Rosario MD

## 2021-04-11 NOTE — PROGRESS NOTES
Renal progress Note    NAME:  Lynn Mejia   :   1928   MRN:   [id number]     ATTENDING: Joselyn Lemons MD  PCP:  Reginaldo Miller MD    Date/Time:  2021 2:25 PM      Subjective:   Patient seen . Seems to be in no distress. Creatinine is unchanged today at 3.0. Blood pressure is well controlled. Afebrile    Past Medical History:   Diagnosis Date    Anemia     Chronic kidney disease     COVID-19     Hypertension     Hypothyroidism     Insomnia       History reviewed. No pertinent surgical history. Social History     Tobacco Use    Smoking status: Never Smoker    Smokeless tobacco: Never Used   Substance Use Topics    Alcohol use: Not on file      Family History   Family history unknown: Yes       No Known Allergies   Prior to Admission medications    Medication Sig Start Date End Date Taking? Authorizing Provider   ergocalciferol, vitamin D2, (VITAMIN D2 PO) Take  by mouth. Provider, Historical   trazodone HCl (TRAZODONE PO) Take  by mouth. Provider, Historical   LOSARTAN PO Take  by mouth. Provider, Historical   OXCARBAZEPINE PO Take  by mouth. Provider, Historical       REVIEW OF SYSTEMS:       Constitutional: Negative for chills and fever. HENT: Negative. Eyes: Negative. Respiratory: As above   cardiovascular: Negative. Gastrointestinal: Negative for abdominal pain and nausea. Skin: Negative. Neurological: Negative. Objective:   VITALS:    Visit Vitals  BP (!) 152/75   Pulse 70   Temp 98.3 °F (36.8 °C)   Resp 20   Ht 5' 7\" (1.702 m)   Wt 63.5 kg (140 lb)   SpO2 100%   Breastfeeding Unknown   BMI 21.93 kg/m²     Temp (24hrs), Av.9 °F (36.6 °C), Min:97.4 °F (36.3 °C), Max:98.3 °F (36.8 °C)      PHYSICAL EXAM:   General:    Alert, cooperative, no distress.      HEENT: Atraumatic, anicteric sclerae, pink conjunctivae     No oral ulcers, mucosa moist, throat clear  Neck:  Supple, symmetrical,  thyroid: non tender  Lungs:   Wheezing and rhonchi are heard chest wall:  No tenderness  No Accessory muscle use. Heart:   Regular  rhythm,  No  murmur   No edema  Abdomen:   Soft, non-tender. Bowel sounds normal  Extremities: No cyanosis. No clubbing  Skin:     Not pale. Not Jaundiced  No rashes   Psych:  Not anxious or agitated. Neurologic: Alert     LAB DATA REVIEWED:    Recent Results (from the past 24 hour(s))   GLUCOSE, POC    Collection Time: 04/10/21  2:33 PM   Result Value Ref Range    Glucose (POC) 366 (H) 65 - 100 mg/dL    Performed by Michelle Ying ([place])    GLUCOSE, POC    Collection Time: 04/10/21  7:40 PM   Result Value Ref Range    Glucose (POC) 266 (H) 65 - 100 mg/dL    Performed by Melly Reece    PROTEIN/CREATININE RATIO, URINE    Collection Time: 04/11/21  5:40 AM   Result Value Ref Range    Protein, urine random 98 (H) 0.0 - 11.9 mg/dL    Creatinine, urine 41.00 mg/dL    Protein/Creat. urine Ratio 2.4     RENAL FUNCTION PANEL    Collection Time: 04/11/21  7:05 AM   Result Value Ref Range    Sodium 144 136 - 145 mmol/L    Potassium 4.6 3.5 - 5.1 mmol/L    Chloride 112 (H) 97 - 108 mmol/L    CO2 23 21 - 32 mmol/L    Anion gap 9 5 - 15 mmol/L    Glucose 188 (H) 65 - 100 mg/dL    BUN 63 (H) 6 - 20 mg/dL    Creatinine 3.01 (H) 0.55 - 1.02 mg/dL    BUN/Creatinine ratio 21 (H) 12 - 20      GFR est AA 18 (L) >60 ml/min/1.73m2    GFR est non-AA 15 (L) >60 ml/min/1.73m2    Calcium 8.4 (L) 8.5 - 10.1 mg/dL    Phosphorus 3.3 2.6 - 4.7 mg/dL    Albumin 2.3 (L) 3.5 - 5.0 g/dL   CK    Collection Time: 04/11/21  7:05 AM   Result Value Ref Range     26 - 192 U/L       Recommendations/Plan:   #1 acute kidney injury on CKD 3  -presented with a creatinine of 3.2 which slightly improved to 3.0 yesterday. Unchanged at 3.0 today  -Baseline creatinine seems to be 1.8 based on labs in February 2021  -TAMIKO could be prerenal secondary to losartan which I will continue to hold  -Agree with IV fluids.   I will increase IV fluid rate 200 mils per hour  -BNP is elevated but patient herself seems to be in no volume overload  -Check renal ultrasound on Monday  -Urine is bland. 2.5 g of proteinuria  -CPK is 140. Evidence of rhabdo    #2 Hypertension  -Blood pressure is well controlled. Continue to hold losartan  -Continue as needed medications for blood pressure control. Currently not needed  -Continue IV fluids    #3 anemia  -Hemoglobin 9.3. Likely anemia of chronic kidney disease.   -I will check iron studies and will start her on weekly Procrit    #4 Covid pneumonia  -Patient presented with shortness of breath. Covid positive  -Has been started on dexamethasone azithromycin and Rocephin  -no respiratory distress for now    #5 renal osteodystrophy  -Pending intact parathyroid hormone and 25-hydroxy vitamin D level.    -Calcium is okay we will check phosphorus level    #6 UTI  -Urine is suggestive of UTI.   She has been started on Rocephin          ________________________________________________________________________  Signed: Luther Paula MD

## 2021-04-11 NOTE — PROGRESS NOTES
Progress Note    Patient: Navdeep Orona MRN: [id number]  SSN: [id number]    YOB: 1928  Age: 80 y.o. Sex: female      Admit Date: 4/8/2021    LOS: 1 day     Subjective:     80years old with COVID-19, pneumonia chronic kidney disease stage III, hypertension and hypothyroidism    Objective:     Vitals:    04/09/21 2051 04/10/21 0418 04/10/21 0735 04/10/21 1112   BP: (!) 143/69 (!) 147/71 139/62    Pulse: 60 62 74    Resp: 20 20 18    Temp: 97.6 °F (36.4 °C) 98.1 °F (36.7 °C) 98.2 °F (36.8 °C)    SpO2:  100% 100% 97%   Weight:       Height:            Intake and Output:  Current Shift: No intake/output data recorded. Last three shifts: No intake/output data recorded. Physical Exam:   General:  Alert, cooperative, no distress, appears stated age. Eyes:  Conjunctivae/corneas clear. PERRL, EOMs intact. Fundi benign   Ears:  Normal TMs and external ear canals both ears. Nose: Nares normal. Septum midline. Mucosa normal. No drainage or sinus tenderness. Mouth/Throat: Lips, mucosa, and tongue normal. Teeth and gums normal.   Neck: Supple, symmetrical, trachea midline, no adenopathy, thyroid: no enlargment/tenderness/nodules, no carotid bruit and no JVD. Back:   Symmetric, no curvature. ROM normal. No CVA tenderness. Lungs:   Clear to auscultation bilaterally. Heart:  Regular rate and rhythm, S1, S2 normal, no murmur, click, rub or gallop. Abdomen:   Soft, non-tender. Bowel sounds normal. No masses,  No organomegaly. Extremities: Extremities normal, atraumatic, no cyanosis or edema. Pulses: 2+ and symmetric all extremities. Skin: Skin color, texture, turgor normal. No rashes or lesions   Lymph nodes: Cervical, supraclavicular, and axillary nodes normal.   Neurologic: CNII-XII intact. Normal strength, sensation and reflexes throughout. Lab/Data Review: All lab results for the last 24 hours reviewed.      Recent Results (from the past 24 hour(s))   METABOLIC PANEL, COMPREHENSIVE    Collection Time: 04/10/21  6:45 AM   Result Value Ref Range    Sodium 142 136 - 145 mmol/L    Potassium 4.4 3.5 - 5.1 mmol/L    Chloride 110 (H) 97 - 108 mmol/L    CO2 24 21 - 32 mmol/L    Anion gap 8 5 - 15 mmol/L    Glucose 169 (H) 65 - 100 mg/dL    BUN 63 (H) 6 - 20 mg/dL    Creatinine 3.09 (H) 0.55 - 1.02 mg/dL    BUN/Creatinine ratio 20 12 - 20      GFR est AA 17 (L) >60 ml/min/1.73m2    GFR est non-AA 14 (L) >60 ml/min/1.73m2    Calcium 8.9 8.5 - 10.1 mg/dL    Bilirubin, total 0.2 0.2 - 1.0 mg/dL    AST (SGOT) 23 15 - 37 U/L    ALT (SGPT) 14 12 - 78 U/L    Alk. phosphatase 45 45 - 117 U/L    Protein, total 7.0 6.4 - 8.2 g/dL    Albumin 2.4 (L) 3.5 - 5.0 g/dL    Globulin 4.6 (H) 2.0 - 4.0 g/dL    A-G Ratio 0.5 (L) 1.1 - 2.2     CBC WITH AUTOMATED DIFF    Collection Time: 04/10/21  6:45 AM   Result Value Ref Range    WBC 11.2 (H) 3.6 - 11.0 K/uL    RBC 4.14 3.80 - 5.20 M/uL    HGB 9.7 (L) 11.5 - 16.0 g/dL    HCT 31.7 (L) 35.0 - 47.0 %    MCV 76.6 (L) 80.0 - 99.0 FL    MCH 23.4 (L) 26.0 - 34.0 PG    MCHC 30.6 30.0 - 36.5 g/dL    RDW 19.0 (H) 11.5 - 14.5 %    PLATELET 491 348 - 076 K/uL    MPV 10.6 8.9 - 12.9 FL    NEUTROPHILS 91 (H) 32 - 75 %    LYMPHOCYTES 4 (L) 12 - 49 %    MONOCYTES 4 (L) 5 - 13 %    EOSINOPHILS 0 0 - 7 %    BASOPHILS 0 0 - 1 %    IMMATURE GRANULOCYTES 1 (H) 0.0 - 0.5 %    ABS. NEUTROPHILS 10.3 (H) 1.8 - 8.0 K/UL    ABS. LYMPHOCYTES 0.5 (L) 0.8 - 3.5 K/UL    ABS. MONOCYTES 0.4 0.0 - 1.0 K/UL    ABS. EOSINOPHILS 0.0 0.0 - 0.4 K/UL    ABS. BASOPHILS 0.0 0.0 - 0.1 K/UL    ABS. IMM.  GRANS. 0.1 (H) 0.00 - 0.04 K/UL    DF AUTOMATED     GLUCOSE, POC    Collection Time: 04/10/21  8:32 AM   Result Value Ref Range    Glucose (POC) 178 (H) 65 - 100 mg/dL    Performed by Ava Pippins (Float [place])    GLUCOSE, POC    Collection Time: 04/10/21 11:07 AM   Result Value Ref Range    Glucose (POC) 280 (H) 65 - 100 mg/dL    Performed by Ava Pippins (Float [place])    GLUCOSE, POC    Collection Time: 04/10/21  2:33 PM   Result Value Ref Range    Glucose (POC) 366 (H) 65 - 100 mg/dL    Performed by Leah WISE [place])    GLUCOSE, POC    Collection Time: 04/10/21  7:40 PM   Result Value Ref Range    Glucose (POC) 266 (H) 65 - 100 mg/dL    Performed by Hanh Leiva          Assessment:     Active Problems:    Pneumonia (4/9/2021)      COVID-19 (4/9/2021)    Chronic kidney disease stage III  Hypertension  Hypothyroidism    Plan:     Continue  treatment    Signed By: Byron Douglass MD     April 10, 2021

## 2021-04-11 NOTE — PROGRESS NOTES
Progress Note    Patient: Jesus Crook MRN: [id number]  SSN: [id number]    YOB: 1928  Age: 80 y.o. Sex: female      Admit Date: 4/8/2021    LOS: 2 days     Subjective:     80years old with COVID-19, pneumonia chronic kidney disease stage III, hypertension and hypothyroidism    Objective:     Vitals:    04/11/21 0713 04/11/21 0714 04/11/21 0829 04/11/21 1440   BP:   (!) 152/75 (!) 166/86   Pulse:   70 75   Resp:   20 18   Temp:   98.3 °F (36.8 °C) 97.8 °F (36.6 °C)   SpO2: 98% 97% 100% 99%   Weight:       Height:            Intake and Output:  Current Shift: No intake/output data recorded. Last three shifts: No intake/output data recorded. Physical Exam:   General:  , no distress, appears stated age. Eyes:  Conjunctivae/corneas clear. PERRL, EOMs intact. Fundi benign   Ears:  Normal TMs and external ear canals both ears. Nose: Nares normal. Septum midline. Mucosa normal. No drainage or sinus tenderness. Mouth/Throat: Lips, mucosa, and tongue normal. Teeth and gums normal.   Neck: Supple, symmetrical, trachea midline, no adenopathy, thyroid: no enlargment/tenderness/nodules, no carotid bruit and no JVD. Back:   Symmetric, no curvature. ROM normal. No CVA tenderness. Lungs:   Clear to auscultation bilaterally. Heart:  Regular rate and rhythm, S1, S2 normal, no murmur, click, rub or gallop. Abdomen:   Soft, non-tender. Bowel sounds normal. No masses,  No organomegaly. Extremities: Extremities normal, atraumatic, no cyanosis or edema. Pulses: 2+ and symmetric all extremities. Skin: Skin color, texture, turgor normal. No rashes or lesions   Lymph nodes: Cervical, supraclavicular, and axillary nodes normal.   Neurologic: CNII-XII intact. Normal strength, sensation and reflexes throughout. Lab/Data Review: All lab results for the last 24 hours reviewed.      Recent Results (from the past 24 hour(s))   GLUCOSE, POC    Collection Time: 04/10/21  7:40 PM   Result Value Ref Range    Glucose (POC) 266 (H) 65 - 100 mg/dL    Performed by Miki Haney    PROTEIN/CREATININE RATIO, URINE    Collection Time: 04/11/21  5:40 AM   Result Value Ref Range    Protein, urine random 98 (H) 0.0 - 11.9 mg/dL    Creatinine, urine 41.00 mg/dL    Protein/Creat.  urine Ratio 2.4     RENAL FUNCTION PANEL    Collection Time: 04/11/21  7:05 AM   Result Value Ref Range    Sodium 144 136 - 145 mmol/L    Potassium 4.6 3.5 - 5.1 mmol/L    Chloride 112 (H) 97 - 108 mmol/L    CO2 23 21 - 32 mmol/L    Anion gap 9 5 - 15 mmol/L    Glucose 188 (H) 65 - 100 mg/dL    BUN 63 (H) 6 - 20 mg/dL    Creatinine 3.01 (H) 0.55 - 1.02 mg/dL    BUN/Creatinine ratio 21 (H) 12 - 20      GFR est AA 18 (L) >60 ml/min/1.73m2    GFR est non-AA 15 (L) >60 ml/min/1.73m2    Calcium 8.4 (L) 8.5 - 10.1 mg/dL    Phosphorus 3.3 2.6 - 4.7 mg/dL    Albumin 2.3 (L) 3.5 - 5.0 g/dL   CK    Collection Time: 04/11/21  7:05 AM   Result Value Ref Range     26 - 192 U/L         Assessment:     Active Problems:    Pneumonia (4/9/2021)      COVID-19 (4/9/2021)    Chronic kidney disease stage III  Hypertension  Hypothyroidism    Plan:     Continue  treatment    Signed By: Bruce Wallace MD     April 11, 2021

## 2021-04-11 NOTE — WOUND CARE
IP WOUND CONSULT Becky Contreras MEDICAL RECORD NUMBER:  [id number] AGE: 80 y.o. GENDER: female  : 1928 TODAY'S DATE:  2021 GENERAL  
 
[] Follow-up [x] New Consult Becky Contreras is a 80 y.o. female referred by:  
[x] Physician 
[] Nursing 
[] Other: PAST MEDICAL HISTORY Past Medical History:  
Diagnosis Date  Anemia  Chronic kidney disease  COVID-19  Hypertension  Hypothyroidism  Insomnia PAST SURGICAL HISTORY History reviewed. No pertinent surgical history. FAMILY HISTORY Family History Family history unknown: Yes ALLERGIES No Known Allergies MEDICATIONS No current facility-administered medications on file prior to encounter. Current Outpatient Medications on File Prior to Encounter Medication Sig Dispense Refill  ergocalciferol, vitamin D2, (VITAMIN D2 PO) Take  by mouth.  trazodone HCl (TRAZODONE PO) Take  by mouth.  LOSARTAN PO Take  by mouth.  OXCARBAZEPINE PO Take  by mouth. 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 
Dressing change: Zinc paste Verbal consent for picture: Yes Contributing Factors: anticoagulation therapy, poor glucose control, decreased mobility, shear force, incontinence of stool, incontinence of urine and malnutrition Wound Gluteal fold/cleft small open wound noted on sacral region (Active) Wound Image   21 1618 Wound Etiology Pressure Stage 3 21 1618 Dressing Status New dressing applied 21 1618 Cleansed Other (Comment) 21 1618 Dressing/Treatment Zinc paste 21 1618 Wound Length (cm) 6 cm 21 1618 Wound Width (cm) 2 cm 21 1618 Wound Depth (cm) 0.2 cm 21 1618 Wound Surface Area (cm^2) 12 cm^2 04/11/21 1618 Wound Volume (cm^3) 2.4 cm^3 04/11/21 1618 Wound Assessment Pink/red;Denuded; Subcutaneous 04/11/21 1618 Drainage Amount Scant 04/11/21 1618 Drainage Description Serosanguinous 04/11/21 1618 Wound Odor None 04/11/21 1618 Concepcion-Wound/Incision Assessment Dry/flaky;Fragile 04/11/21 1618 Edges Defined edges 04/11/21 1618 Wound Thickness Description Full thickness 04/11/21 1618 Number of days: 3 PLAN Skin Care & Pressure Relief Recommendations Minimize layers of linen Turn/reposition approximately every 2 hours Pillow wedges Manage incontinence Promote continence; Skin Protective lotion/cream to buttocks and sacrum daily and as needed with incontinence care Offload heels pillows Leo Brambila Blood Glucose: 266 on 4/10/21 (patient receiving Decadron) Albumin: 2.4 on 4/10/21 WBCs: 11.2 on 4/10/21 Support Surface: Aiken mattress with waffle overlay Physician/Provider notified:  
Recommendations: Apply PureWick to mange  incontinence, spoke with Stacy MARIE. Apply zinc paste to buttocks and sacrum TID and prn for soiling. Float heels with pillows while in bed. Turn and reposition q2h on sides at 30 degrees or greater. Maintain HOB at 30 degrees or less if not contraindicated. Will continue to follow. Teaching completed with:  
[] Patient          
[] Family member      
[] Caregiver         
[] Nursing 
[] Other Patient/Caregiver Teaching: 
Level of patient/caregiver understanding able to:  
[] Indicates understanding       [] Needs reinforcement 
[] Unsuccessful      [] Verbal Understanding 
[] Demonstrated understanding       [] No evidence of learning 
[] Refused teaching         [] N/A Electronically signed by Patria Linder RN on 4/11/2021 at 4:23 PM

## 2021-04-11 NOTE — PROGRESS NOTES
Reason for Admission:  PNA & COVID 19                     RUR Score:     17% Low                Plan for utilizing home health:     Prior HH in the past.       PCP: First and Last name:  NOA Marlow     Name of Practice: [address] [place] [address] in [place]   Are you a current patient: Yes/No: Yes   Approximate date of last visit: March 4th, 2021 Telehealth visit   Can you participate in a virtual visit with your PCP: Yes                    Current Advanced Directive/Advance Care Plan: Full Code      Healthcare Decision Maker:  Daughter Tevin Wade @ [phone]. Click here to complete [address] including selection of the Healthcare Decision Maker Relationship (ie \"Primary\")                             Transition of Care Plan:                      Patient lives in her own home ranch style home w/three steps to enter the front door. Kindra Albert is the primary caregiver; and lives w/her mother. PCP is NOA Marlow ([facility]). Last visit was March 4th, 2021 (telehealth). [address] [facility] (delivery service). Requires assistance w/all ADL's and IADL's from the daughter. Patient doesn't drive and family provides all transportation needs. No home O2. DME includes (cane, walker, wheelchair, and or rollator). Prior [place] and SNF in the past.  No prior IRF. Medicare 2nd IM letter prior to discharge. Will continue to monitor and follow. Discharge disposition to be determined. Care Management Interventions  PCP Verified by CM: Yes(Approx )  Mode of Transport at Discharge: (Transport)  Transition of Care Consult (CM Consult): Discharge Planning  Discharge Durable Medical Equipment: (No home O2.   DME (cane, walker, wheelchair, and rollator))  Current Support Network: Own Home(Ranch style home w/three steps to enter the front door.)  Confirm Follow Up Transport: Other (see comment)(Transport)  Discharge Location  Discharge Placement: (TBD)        Juancarlos Haskins, MSW

## 2021-04-11 NOTE — PROGRESS NOTES
Educated patient as to the need to turn and reposition every 2 hours at minimum; however, due to patients dementia, patient is unable to retain education. Will continue to monitor.

## 2021-04-12 LAB
25(OH)D3 SERPL-MCNC: 55.7 NG/ML (ref 30–100)
ALBUMIN SERPL-MCNC: 2.4 G/DL (ref 3.5–5)
ANION GAP SERPL CALC-SCNC: 8 MMOL/L (ref 5–15)
BUN SERPL-MCNC: 58 MG/DL (ref 6–20)
BUN/CREAT SERPL: 22 (ref 12–20)
CA-I BLD-MCNC: 8.3 MG/DL (ref 8.5–10.1)
CA-I BLD-MCNC: 8.5 MG/DL (ref 8.5–10.1)
CHLORIDE SERPL-SCNC: 117 MMOL/L (ref 97–108)
CO2 SERPL-SCNC: 21 MMOL/L (ref 21–32)
CREAT SERPL-MCNC: 2.63 MG/DL (ref 0.55–1.02)
GLUCOSE BLD STRIP.AUTO-MCNC: 205 MG/DL (ref 65–100)
GLUCOSE BLD STRIP.AUTO-MCNC: 245 MG/DL (ref 65–100)
GLUCOSE SERPL-MCNC: 194 MG/DL (ref 65–100)
PERFORMED BY, TECHID: ABNORMAL
PERFORMED BY, TECHID: ABNORMAL
PHOSPHATE SERPL-MCNC: 3 MG/DL (ref 2.6–4.7)
POTASSIUM SERPL-SCNC: 4.4 MMOL/L (ref 3.5–5.1)
PTH-INTACT SERPL-MCNC: 457.9 PG/ML (ref 18.4–88)
SODIUM SERPL-SCNC: 146 MMOL/L (ref 136–145)

## 2021-04-12 PROCEDURE — 82962 GLUCOSE BLOOD TEST: CPT

## 2021-04-12 PROCEDURE — 74011250636 HC RX REV CODE- 250/636: Performed by: FAMILY MEDICINE

## 2021-04-12 PROCEDURE — 80069 RENAL FUNCTION PANEL: CPT

## 2021-04-12 PROCEDURE — 65270000029 HC RM PRIVATE

## 2021-04-12 PROCEDURE — 74011000250 HC RX REV CODE- 250: Performed by: FAMILY MEDICINE

## 2021-04-12 PROCEDURE — 74011250637 HC RX REV CODE- 250/637: Performed by: INTERNAL MEDICINE

## 2021-04-12 PROCEDURE — 74011250636 HC RX REV CODE- 250/636: Performed by: INTERNAL MEDICINE

## 2021-04-12 PROCEDURE — 36415 COLL VENOUS BLD VENIPUNCTURE: CPT

## 2021-04-12 RX ORDER — AMLODIPINE BESYLATE 5 MG/1
5 TABLET ORAL DAILY
Status: DISCONTINUED | OUTPATIENT
Start: 2021-04-12 | End: 2021-04-13

## 2021-04-12 RX ORDER — DEXAMETHASONE SODIUM PHOSPHATE 4 MG/ML
4 INJECTION, SOLUTION INTRA-ARTICULAR; INTRALESIONAL; INTRAMUSCULAR; INTRAVENOUS; SOFT TISSUE EVERY 12 HOURS
Status: DISCONTINUED | OUTPATIENT
Start: 2021-04-12 | End: 2021-04-14

## 2021-04-12 RX ADMIN — AMLODIPINE BESYLATE 5 MG: 5 TABLET ORAL at 18:12

## 2021-04-12 RX ADMIN — HEPARIN SODIUM 5000 UNITS: 5000 INJECTION INTRAVENOUS; SUBCUTANEOUS at 05:35

## 2021-04-12 RX ADMIN — CEFTRIAXONE SODIUM 1 G: 1 INJECTION, POWDER, FOR SOLUTION INTRAMUSCULAR; INTRAVENOUS at 12:05

## 2021-04-12 RX ADMIN — DEXAMETHASONE SODIUM PHOSPHATE 4 MG: 4 INJECTION, SOLUTION INTRAMUSCULAR; INTRAVENOUS at 18:12

## 2021-04-12 RX ADMIN — AZITHROMYCIN DIHYDRATE 500 MG: 500 INJECTION, POWDER, LYOPHILIZED, FOR SOLUTION INTRAVENOUS at 12:05

## 2021-04-12 RX ADMIN — DEXAMETHASONE SODIUM PHOSPHATE 4 MG: 4 INJECTION, SOLUTION INTRAMUSCULAR; INTRAVENOUS at 05:34

## 2021-04-12 RX ADMIN — HEPARIN SODIUM 5000 UNITS: 5000 INJECTION INTRAVENOUS; SUBCUTANEOUS at 21:59

## 2021-04-12 RX ADMIN — HEPARIN SODIUM 5000 UNITS: 5000 INJECTION INTRAVENOUS; SUBCUTANEOUS at 18:12

## 2021-04-12 NOTE — PROGRESS NOTES
General Daily Progress Note          Patient Name:   Sallie Dance       YOB: 1928       Age:  80 y.o. Admit Date: 4/8/2021      Subjective:         Patient alert awake not in distress        Objective:     Visit Vitals  BP (!) 151/85   Pulse 84   Temp 97.8 °F (36.6 °C)   Resp 18   Ht 5' 7\" (1.702 m)   Wt 63.5 kg (140 lb)   SpO2 99%   Breastfeeding Unknown   BMI 21.93 kg/m²        Recent Results (from the past 24 hour(s))   RENAL FUNCTION PANEL    Collection Time: 04/12/21  5:50 AM   Result Value Ref Range    Sodium 146 (H) 136 - 145 mmol/L    Potassium 4.4 3.5 - 5.1 mmol/L    Chloride 117 (H) 97 - 108 mmol/L    CO2 21 21 - 32 mmol/L    Anion gap 8 5 - 15 mmol/L    Glucose 194 (H) 65 - 100 mg/dL    BUN 58 (H) 6 - 20 mg/dL    Creatinine 2.63 (H) 0.55 - 1.02 mg/dL    BUN/Creatinine ratio 22 (H) 12 - 20      GFR est AA 21 (L) >60 ml/min/1.73m2    GFR est non-AA 17 (L) >60 ml/min/1.73m2    Calcium 8.5 8.5 - 10.1 mg/dL    Phosphorus 3.0 2.6 - 4.7 mg/dL    Albumin 2.4 (L) 3.5 - 5.0 g/dL   GLUCOSE, POC    Collection Time: 04/12/21  8:02 AM   Result Value Ref Range    Glucose (POC) 245 (H) 65 - 100 mg/dL    Performed by Sunita Okeefe    GLUCOSE, POC    Collection Time: 04/12/21 11:07 AM   Result Value Ref Range    Glucose (POC) 205 (H) 65 - 100 mg/dL    Performed by Sunita Okeefe      [unfilled]      Review of Systems    Constitutional: Negative for chills and fever. HENT: Negative. Eyes: Negative. Respiratory: Negative. Cardiovascular: Negative. Gastrointestinal: Negative for abdominal pain and nausea. Skin: Negative. Neurological: Negative. Physical Exam:      Constitutional: pt is oriented to person, place, and time. HENT:   Head: Normocephalic and atraumatic. Eyes: Pupils are equal, round, and reactive to light. EOM are normal.   Cardiovascular: Normal rate, regular rhythm and normal heart sounds. Pulmonary/Chest: Breath sounds normal. No wheezes. No rales. Exhibits no tenderness. Abdominal: Soft. Bowel sounds are normal. There is no abdominal tenderness. There is no rebound and no guarding. Musculoskeletal: Normal range of motion. Neurological: pt is alert and oriented to person, place, and time. XR CHEST SNGL V   Final Result           Recent Results (from the past 24 hour(s))   RENAL FUNCTION PANEL    Collection Time: 04/12/21  5:50 AM   Result Value Ref Range    Sodium 146 (H) 136 - 145 mmol/L    Potassium 4.4 3.5 - 5.1 mmol/L    Chloride 117 (H) 97 - 108 mmol/L    CO2 21 21 - 32 mmol/L    Anion gap 8 5 - 15 mmol/L    Glucose 194 (H) 65 - 100 mg/dL    BUN 58 (H) 6 - 20 mg/dL    Creatinine 2.63 (H) 0.55 - 1.02 mg/dL    BUN/Creatinine ratio 22 (H) 12 - 20      GFR est AA 21 (L) >60 ml/min/1.73m2    GFR est non-AA 17 (L) >60 ml/min/1.73m2    Calcium 8.5 8.5 - 10.1 mg/dL    Phosphorus 3.0 2.6 - 4.7 mg/dL    Albumin 2.4 (L) 3.5 - 5.0 g/dL   GLUCOSE, POC    Collection Time: 04/12/21  8:02 AM   Result Value Ref Range    Glucose (POC) 245 (H) 65 - 100 mg/dL    Performed by Ephraim Campo    GLUCOSE, POC    Collection Time: 04/12/21 11:07 AM   Result Value Ref Range    Glucose (POC) 205 (H) 65 - 100 mg/dL    Performed by Ephraim Campo        Results     Procedure Component Value Units Date/Time    COVID-19 RAPID TEST [[id number]]  (Abnormal) Collected: 04/09/21 0428    Order Status: Completed Specimen: Nasopharyngeal Updated: 04/09/21 0553     Specimen source Nasopharyngeal        COVID-19 rapid test DETECTED        Comment: Results verified, phoned to and read back by Tabitha Bolden, RN @ 0553/W3 Rapid [employer] ID Now   The specimen is POSITIVE for SARS-CoV-2, the novel coronavirus associated with COVID-19. This test has been authorized by the FDA under an Emergency Use Authorization (EUA) for use by authorized laboratories.    Fact sheet for Healthcare Providers: ConventionUpdate.co.nz Fact sheet for Patients: [facility]   Methodology: Isothermal Nucleic Acid   Amplification         CULTURE, URINE [[id number]] Collected: 04/08/21 2000    Order Status: Completed Specimen: Urine Updated: 04/10/21 1143     Special Requests: --        No Special Requests  Reflexed from [id number]       Culture result: No Growth (<1000 cfu/mL)              Labs:     Recent Labs     04/10/21  0645   WBC 11.2*   HGB 9.7*   HCT 31.7*        Recent Labs     04/12/21  0550 04/11/21  0705 04/10/21  0645   * 144 142   K 4.4 4.6 4.4   * 112* 110*   CO2 21 23 24   BUN 58* 63* 63*   CREA 2.63* 3.01* 3.09*   * 188* 169*   CA 8.5 8.4* 8.9   PHOS 3.0 3.3  --      Recent Labs     04/12/21  0550 04/11/21  0705 04/10/21  0645   ALT  --   --  14   AP  --   --  45   TBILI  --   --  0.2   TP  --   --  7.0   ALB 2.4* 2.3* 2.4*   GLOB  --   --  4.6*     No results for input(s): INR, PTP, APTT, INREXT in the last 72 hours. No results for input(s): FE, TIBC, PSAT, FERR in the last 72 hours. No results found for: FOL, RBCF   No results for input(s): PH, PCO2, PO2 in the last 72 hours.   Recent Labs     04/11/21  0705        No results found for: CHOL, CHOLX, CHLST, CHOLV, HDL, HDLP, LDL, LDLC, DLDLP, TGLX, TRIGL, TRIGP, CHHD, CHHDX  Lab Results   Component Value Date/Time    Glucose (POC) 205 (H) 04/12/2021 11:07 AM    Glucose (POC) 245 (H) 04/12/2021 08:02 AM    Glucose (POC) 266 (H) 04/10/2021 07:40 PM    Glucose (POC) 366 (H) 04/10/2021 02:33 PM    Glucose (POC) 280 (H) 04/10/2021 11:07 AM     Lab Results   Component Value Date/Time    Color Yellow 04/08/2021 08:00 PM    Appearance Turbid (A) 04/08/2021 08:00 PM    Specific gravity 1.015 04/08/2021 08:00 PM    pH (UA) 5.0 04/08/2021 08:00 PM    Protein 100 (A) 04/08/2021 08:00 PM    Glucose Negative 04/08/2021 08:00 PM    Ketone Negative 04/08/2021 08:00 PM    Bilirubin Negative 04/08/2021 08:00 PM    Urobilinogen 0.1 04/08/2021 08:00 PM    Nitrites Negative 04/08/2021 08:00 PM    Leukocyte Esterase Trace (A) 04/08/2021 08:00 PM    Bacteria 4+ (A) 04/08/2021 08:00 PM    WBC 20-50 04/08/2021 08:00 PM    RBC 0-5 04/08/2021 08:00 PM         Assessment:          COVID-19 pneumonitis  Chronic kidney disease  Hypertension  Insomnia    Plan:        On Zithromax and Rocephin IV  On Decadron 4 mg every 12 hours  On heparin 5000 subcu every 8 hours  IV fluids  Monitor renal function  Repeat the labs and PT OT consult      Current Facility-Administered Medications:     dexamethasone (DECADRON) 4 mg/mL injection 4 mg, 4 mg, IntraVENous, Q12H, Torrey Woodruff MD    0.9% sodium chloride infusion, 100 mL/hr, IntraVENous, CONTINUOUS, Anjelica Rowland MD, Last Rate: 100 mL/hr at 04/11/21 2224, 100 mL/hr at 04/11/21 2224    acetaminophen (TYLENOL) tablet 650 mg, 650 mg, Oral, Q6H PRN **OR** acetaminophen (TYLENOL) suppository 650 mg, 650 mg, Rectal, Q6H PRN, Lyle Garcia MD    polyethylene glycol (MIRALAX) packet 17 g, 17 g, Oral, DAILY PRN, Lyle Garcia MD    ondansetron (ZOFRAN ODT) tablet 4 mg, 4 mg, Oral, Q8H PRN **OR** ondansetron (ZOFRAN) injection 4 mg, 4 mg, IntraVENous, Q6H PRN, Reynaldo Garcia MD    heparin (porcine) injection 5,000 Units, 5,000 Units, SubCUTAneous, Q8H, Reynaldo Garcia MD, 5,000 Units at 04/12/21 0535    cefTRIAXone (ROCEPHIN) 1 g in sterile water (preservative free) 10 mL IV syringe, 1 g, IntraVENous, Q24H, Reynaldo Garcia MD, 1 g at 04/11/21 0927    azithromycin (ZITHROMAX) 500 mg in 0.9% sodium chloride 250 mL (VIAL-MATE), 500 mg, IntraVENous, Q24H, Reynaldo Garcia MD, 500 mg at 04/11/21 4487

## 2021-04-12 NOTE — CONSULTS
PULMONARY NOTE  [facility]    Name: Danelle Downs MRN: [id number]   : 1928 Hospital: [address] [place] [address]   Date: 2021  Admission date: 2021 Hospital Day: 5       HPI:     Hospital Problems  Date Reviewed: 2020          Codes Class Noted POA    Pneumonia ICD-10-CM: J18.9  ICD-9-CM: 705  2021 Unknown        COVID-19 ICD-10-CM: U07.1  ICD-9-CM: 079.89  2021 Unknown                   [x] High complexity decision making was performed  [x] See my orders for details      Subjective/Initial History:     I was asked by Gibson Goodson MD to see Danelle Downs  a 80 y.o.  female in consultation     Excerpts from admission 2021 or consult notes as follows:   80-year-old lady came in because of shortness of breath she is a nursing home resident past medical history of COVID-19 pneumonia hypertension hypothyroidism chronic kidney disease and insomnia. She was not feeling well Covid test came back positive chest x-ray shows left lower lobe infiltrate so admitted and pulmonary consult was called for further evaluation      No Known Allergies     MAR reviewed and pertinent medications noted or modified as needed     Current Facility-Administered Medications   Medication    0.9% sodium chloride infusion    acetaminophen (TYLENOL) tablet 650 mg    Or    acetaminophen (TYLENOL) suppository 650 mg    polyethylene glycol (MIRALAX) packet 17 g    ondansetron (ZOFRAN ODT) tablet 4 mg    Or    ondansetron (ZOFRAN) injection 4 mg    heparin (porcine) injection 5,000 Units    dexamethasone (DECADRON) 4 mg/mL injection 4 mg    cefTRIAXone (ROCEPHIN) 1 g in sterile water (preservative free) 10 mL IV syringe    azithromycin (ZITHROMAX) 500 mg in 0.9% sodium chloride 250 mL (VIAL-MATE)      Patient PCP: Other, MD Calvin  PMH:  has a past medical history of Anemia, Chronic kidney disease, COVID-19, Hypertension, Hypothyroidism, and Insomnia.   PSH:   has no past surgical history on file. FHX: Family history is unknown by patient. SHX:  reports that she has never smoked. She has never used smokeless tobacco.     ROS:  Unable to obtain      Objective:     Vital Signs: Telemetry:    normal sinus rhythm Intake/Output:   Visit Vitals  BP (!) 151/85   Pulse 84   Temp 97.8 °F (36.6 °C)   Resp 18   Ht 5' 7\" (1.702 m)   Wt 63.5 kg (140 lb)   SpO2 99%   Breastfeeding Unknown   BMI 21.93 kg/m²       Temp (24hrs), Av.8 °F (36.6 °C), Min:97.8 °F (36.6 °C), Max:97.8 °F (36.6 °C)        O2 Device: Nasal cannula O2 Flow Rate (L/min): 1 l/min       Wt Readings from Last 4 Encounters:   21 63.5 kg (140 lb)   02/15/21 68 kg (150 lb)        No intake or output data in the 24 hours ending 21 1024    Last shift:      No intake/output data recorded. Last 3 shifts: No intake/output data recorded. Physical Exam:     Physical Exam   Constitutional: She appears distressed. HENT:   Head: Normocephalic and atraumatic. Eyes: Pupils are equal, round, and reactive to light. Conjunctivae and EOM are normal.   Neck: Normal range of motion. Neck supple. Cardiovascular: Normal rate and regular rhythm. Pulmonary/Chest: Effort normal. She has rales. Musculoskeletal: Normal range of motion. Neurological: She is alert. Labs:    Recent Labs     04/10/21  0645   WBC 11.2*   HGB 9.7*        Recent Labs     21  0550 21  0705 04/10/21  0645   * 144 142   K 4.4 4.6 4.4   * 112* 110*   CO2 21 23 24   * 188* 169*   BUN 58* 63* 63*   CREA 2.63* 3.01* 3.09*   CA 8.5 8.4* 8.9   PHOS 3.0 3.3  --    ALB 2.4* 2.3* 2.4*   ALT  --   --  14     No results for input(s): PH, PCO2, PO2, HCO3, FIO2 in the last 72 hours.   Recent Labs     21  0705        No results found for: BNPP, BNP   Lab Results   Component Value Date/Time    Culture result: No Growth (<1000 cfu/mL) 2021 08:00 PM   No results found for: TSH, TSHEXT, TSHEXT    Imaging:    CXR Results  (Last 48 hours)    None        Results from Hospital Encounter encounter on 04/08/21   XR CHEST SNGL V    Narrative History: Sepsis? Single view of the chest was obtained. Heart size is enlarged. This is an  increased patchy airspace disease in the lung bases especially on the left  retrocardiac region. Upper lungs are clear. No effusions or pneumothorax. Bony  structures appear osteopenic. There are degenerative changes. No acute bony  abnormality. Pression: Changes consistent with left lower lobe infiltrate. Follow-up chest  x-rays recommended. Results from [place] encounter on 02/15/21   CT SPINE CERV WO CONT    Narrative Fall. Comparison CT of the cervical spine 6/13/2016. Technique: Axial images cervical spine with sagittal and coronal reformats. 3-D  MIPs. Dose reduction: All CT scans at this facility are performed using dose reduction  optimization techniques as appropriate to a performed exam including the  following: Automated exposure control, adjustments of the mA and/or kV according  to patient's size, or use of iterative reconstruction technique. FINDINGS: The bones are demineralized which limits evaluation for fracture. No  listhesis. C6, C7, T1 vertebral body compressions are unchanged. T4 vertebral  body almost complete compression appears nonacute but has occurred in the  interim. No evident acute fracture, jumped or perched facet. Multilevel disc  greater than placenta narrowing, sclerosis, osteophytosis. 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 Lateral pillars similar alignment as previous. No prevertebral soft tissue  swelling. Lung apices clear. Impression 1. No acute bony findings. 2. T4 vertebral body compression has occurred in the interim but appears  nonacute. Correlate for any focal pain. IMPRESSION:   1. Acute hypoxic respiratory failure  2. COVID-19 pneumonia  3. Chronic kidney disease  4.  Hypertension and insomnia by history  5. Prognosis guarded       RECOMMENDATIONS/PLAN:     1. Oxygen via nasal Cannula will start weaning this morning he was on room air  2. Patient is on Decadron Zithromax  Rocephin and heparin will decrease Decadron   3. Chest x-ray shows left lower lobe infiltrate  4.  Continue with IV fluid          Michelle Vasquez MD

## 2021-04-12 NOTE — PROGRESS NOTES
Renal progress Note    NAME:  Ananya Apodaca   :   1928   MRN:   [id number]     ATTENDING: Gladys Bains MD  PCP:  Yulia Villalta MD    Date/Time:  2021 2:25 PM      Subjective:   Patient seen . Seems to be in no distress. Creatinine has improved to 2.6. Past Medical History:   Diagnosis Date    Anemia     Chronic kidney disease     COVID-19     Hypertension     Hypothyroidism     Insomnia       History reviewed. No pertinent surgical history. Social History     Tobacco Use    Smoking status: Never Smoker    Smokeless tobacco: Never Used   Substance Use Topics    Alcohol use: Not on file      Family History   Family history unknown: Yes       No Known Allergies   Prior to Admission medications    Medication Sig Start Date End Date Taking? Authorizing Provider   ergocalciferol, vitamin D2, (VITAMIN D2 PO) Take  by mouth. Provider, Historical   trazodone HCl (TRAZODONE PO) Take  by mouth. Provider, Historical   LOSARTAN PO Take  by mouth. Provider, Historical   OXCARBAZEPINE PO Take  by mouth. Provider, Historical       REVIEW OF SYSTEMS:       Constitutional: Negative for chills and fever. HENT: Negative. Eyes: Negative. Respiratory: As above   cardiovascular: Negative. Gastrointestinal: Negative for abdominal pain and nausea. Skin: Negative. Neurological: Negative. Objective:   VITALS:    Visit Vitals  BP (!) 171/90 (BP 1 Location: Left upper arm)   Pulse 69   Temp 97.8 °F (36.6 °C)   Resp 20   Ht 5' 7\" (1.702 m)   Wt 63.5 kg (140 lb)   SpO2 98%   Breastfeeding Unknown   BMI 21.93 kg/m²     Temp (24hrs), Av.8 °F (36.6 °C), Min:97.8 °F (36.6 °C), Max:97.8 °F (36.6 °C)      PHYSICAL EXAM:   General:    Alert, cooperative, no distress.      HEENT: Atraumatic, anicteric sclerae, pink conjunctivae     No oral ulcers, mucosa moist, throat clear  Neck:  Supple, symmetrical,  thyroid: non tender  Lungs:   Wheezing and rhonchi are heard chest wall:  No tenderness  No Accessory muscle use. Heart:   Regular  rhythm,  No  murmur   No edema  Abdomen:   Soft, non-tender. Bowel sounds normal  Extremities: No cyanosis. No clubbing  Skin:     Not pale. Not Jaundiced  No rashes   Psych:  Not anxious or agitated. Neurologic: Alert     LAB DATA REVIEWED:    Recent Results (from the past 24 hour(s))   RENAL FUNCTION PANEL    Collection Time: 04/12/21  5:50 AM   Result Value Ref Range    Sodium 146 (H) 136 - 145 mmol/L    Potassium 4.4 3.5 - 5.1 mmol/L    Chloride 117 (H) 97 - 108 mmol/L    CO2 21 21 - 32 mmol/L    Anion gap 8 5 - 15 mmol/L    Glucose 194 (H) 65 - 100 mg/dL    BUN 58 (H) 6 - 20 mg/dL    Creatinine 2.63 (H) 0.55 - 1.02 mg/dL    BUN/Creatinine ratio 22 (H) 12 - 20      GFR est AA 21 (L) >60 ml/min/1.73m2    GFR est non-AA 17 (L) >60 ml/min/1.73m2    Calcium 8.5 8.5 - 10.1 mg/dL    Phosphorus 3.0 2.6 - 4.7 mg/dL    Albumin 2.4 (L) 3.5 - 5.0 g/dL   GLUCOSE, POC    Collection Time: 04/12/21  8:02 AM   Result Value Ref Range    Glucose (POC) 245 (H) 65 - 100 mg/dL    Performed by Lauren Jimenez    GLUCOSE, POC    Collection Time: 04/12/21 11:07 AM   Result Value Ref Range    Glucose (POC) 205 (H) 65 - 100 mg/dL    Performed by Lauren Jimenez        Recommendations/Plan:       #1 acute kidney injury on CKD 3:  -presented with a creatinine of 3.2 which has improved to 2.6  -Baseline creatinine seems to be 1.8 based on labs in February 2021  -TAMIKO could be prerenal secondary to losartan which I will continue to hold  -Urine is bland. 2.5 g of proteinuria  -will keep on IVF    #2 Hypertension:  -Blood pressure is elevated. -Continue to hold losartan  -will add amlodipine 5 mg daily     #3 anemia  -Hemoglobin 9.3.    -Likely anemia of chronic kidney disease.   -I will check iron studies and will start her on weekly Procrit    #4 Covid pneumonia  -Patient presented with shortness of breath.   Covid positive  -Has been started on dexamethasone azithromycin and Rocephin  -no respiratory distress for now    #5 renal osteodystrophy  -Pending intact parathyroid hormone and 25-hydroxy vitamin D level.    -Calcium is okay we will check phosphorus level    #6 UTI  -Urine is suggestive of UTI.   She has been started on Rocephin          ________________________________________________________________________  Signed: Brook Schmitt MD

## 2021-04-12 NOTE — PROGRESS NOTES
Physician Progress Note      PATIENT:               Joe Irving  CSN #:                  [id number]  :                       1928  ADMIT DATE:       2021 7:32 PM  [address] [place] DATE:  RESPONDING  PROVIDER #:        Jeff Polanco MD          QUERY TEXT:    Dear Dr. Kendal Grimes:    Patient admitted with Covid PNA with acute respiratory failure. Per nursing documentation, patient noted to also have sacral pressure ulcer. If possible, please document in progress notes and discharge summary the location, present on admission status and stage of the pressure ulcer:     The medical record reflects the following:  Risk Factors: 80 F admitted with SNF with Covid PNA with acute respiratory failure, TAMIKO on CKD3, HTN, anemia  Clinical Indicators: per nursing documentation patient with wound to sacral area on admission; per wound care nurse evaluation documented , patient with stage 3 pressure injury to sacral area  Treatment: wound care consult, wound care as ordered, turn and reposition, pressure reducing bed surface, ADL care by staff    Stage 1:  Non-blanchable erythema of intact skin  Stage 2:  Abrasion, Blister, Partial-thickness skin loss, with exposed dermis  Stage 3:  Full-thickness skin loss with damage or necrosis of subcutaneous tissue  Stage 4:  Full-thickness skin & soft tissue loss through to underlying muscle, tendon or bone  Unstageable: Obscured full-thickness skin & tissue loss    Thank you,  JOSE LUIS MirandaN, RN, [employer]   Clinical   [phone]  Options provided:  -- Stage 3 Pressure Ulcer of sacrum present on admission  -- Stage 3 Pressure Ulcer of sacrum not present on admission  -- Other - I will add my own diagnosis  -- Disagree - Not applicable / Not valid  -- Disagree - Clinically unable to determine / Unknown  -- Refer to Clinical Documentation Reviewer    PROVIDER RESPONSE TEXT:    This patient has a stage 3 pressure ulcer of the sacrum which was present on admission.     Query created by: Stef Link on 4/12/2021 7:44 AM      Electronically signed by:  Mercedes Ross MD 4/12/2021 8:23 AM

## 2021-04-13 LAB
ALBUMIN SERPL-MCNC: 2.4 G/DL (ref 3.5–5)
ALBUMIN SERPL-MCNC: 2.4 G/DL (ref 3.5–5)
ALBUMIN/GLOB SERPL: 0.5 {RATIO} (ref 1.1–2.2)
ALP SERPL-CCNC: 48 U/L (ref 45–117)
ALT SERPL-CCNC: 16 U/L (ref 12–78)
ANION GAP SERPL CALC-SCNC: 8 MMOL/L (ref 5–15)
ANION GAP SERPL CALC-SCNC: 8 MMOL/L (ref 5–15)
AST SERPL W P-5'-P-CCNC: 14 U/L (ref 15–37)
BASOPHILS # BLD: 0 K/UL (ref 0–0.1)
BASOPHILS NFR BLD: 0 % (ref 0–1)
BILIRUB SERPL-MCNC: 0.3 MG/DL (ref 0.2–1)
BUN SERPL-MCNC: 51 MG/DL (ref 6–20)
BUN SERPL-MCNC: 53 MG/DL (ref 6–20)
BUN/CREAT SERPL: 23 (ref 12–20)
BUN/CREAT SERPL: 24 (ref 12–20)
CA-I BLD-MCNC: 8.6 MG/DL (ref 8.5–10.1)
CA-I BLD-MCNC: 8.6 MG/DL (ref 8.5–10.1)
CHLORIDE SERPL-SCNC: 118 MMOL/L (ref 97–108)
CHLORIDE SERPL-SCNC: 118 MMOL/L (ref 97–108)
CO2 SERPL-SCNC: 22 MMOL/L (ref 21–32)
CO2 SERPL-SCNC: 22 MMOL/L (ref 21–32)
CREAT SERPL-MCNC: 2.23 MG/DL (ref 0.55–1.02)
CREAT SERPL-MCNC: 2.24 MG/DL (ref 0.55–1.02)
DIFFERENTIAL METHOD BLD: ABNORMAL
EOSINOPHIL # BLD: 0 K/UL (ref 0–0.4)
EOSINOPHIL NFR BLD: 0 % (ref 0–7)
ERYTHROCYTE [DISTWIDTH] IN BLOOD BY AUTOMATED COUNT: 19.1 % (ref 11.5–14.5)
GLOBULIN SER CALC-MCNC: 4.5 G/DL (ref 2–4)
GLUCOSE SERPL-MCNC: 203 MG/DL (ref 65–100)
GLUCOSE SERPL-MCNC: 205 MG/DL (ref 65–100)
HCT VFR BLD AUTO: 35.1 % (ref 35–47)
HGB BLD-MCNC: 10.9 G/DL (ref 11.5–16)
IMM GRANULOCYTES # BLD AUTO: 0.3 K/UL (ref 0–0.04)
IMM GRANULOCYTES NFR BLD AUTO: 4 % (ref 0–0.5)
LYMPHOCYTES # BLD: 0.6 K/UL (ref 0.8–3.5)
LYMPHOCYTES NFR BLD: 9 % (ref 12–49)
MCH RBC QN AUTO: 23.5 PG (ref 26–34)
MCHC RBC AUTO-ENTMCNC: 31.1 G/DL (ref 30–36.5)
MCV RBC AUTO: 75.6 FL (ref 80–99)
MONOCYTES # BLD: 0.3 K/UL (ref 0–1)
MONOCYTES NFR BLD: 4 % (ref 5–13)
NEUTS SEG # BLD: 6.1 K/UL (ref 1.8–8)
NEUTS SEG NFR BLD: 87 % (ref 32–75)
PHOSPHATE SERPL-MCNC: 2.7 MG/DL (ref 2.6–4.7)
PLATELET # BLD AUTO: 420 K/UL (ref 150–400)
POTASSIUM SERPL-SCNC: 4.2 MMOL/L (ref 3.5–5.1)
POTASSIUM SERPL-SCNC: 4.3 MMOL/L (ref 3.5–5.1)
PROT SERPL-MCNC: 6.9 G/DL (ref 6.4–8.2)
RBC # BLD AUTO: 4.64 M/UL (ref 3.8–5.2)
SODIUM SERPL-SCNC: 148 MMOL/L (ref 136–145)
SODIUM SERPL-SCNC: 148 MMOL/L (ref 136–145)
WBC # BLD AUTO: 7 K/UL (ref 3.6–11)

## 2021-04-13 PROCEDURE — 36415 COLL VENOUS BLD VENIPUNCTURE: CPT

## 2021-04-13 PROCEDURE — 65270000029 HC RM PRIVATE

## 2021-04-13 PROCEDURE — 74011000250 HC RX REV CODE- 250: Performed by: FAMILY MEDICINE

## 2021-04-13 PROCEDURE — 74011250636 HC RX REV CODE- 250/636: Performed by: FAMILY MEDICINE

## 2021-04-13 PROCEDURE — 94762 N-INVAS EAR/PLS OXIMTRY CONT: CPT

## 2021-04-13 PROCEDURE — 74011250636 HC RX REV CODE- 250/636: Performed by: INTERNAL MEDICINE

## 2021-04-13 PROCEDURE — 97166 OT EVAL MOD COMPLEX 45 MIN: CPT

## 2021-04-13 PROCEDURE — 74011250637 HC RX REV CODE- 250/637: Performed by: INTERNAL MEDICINE

## 2021-04-13 PROCEDURE — 97162 PT EVAL MOD COMPLEX 30 MIN: CPT

## 2021-04-13 PROCEDURE — 80053 COMPREHEN METABOLIC PANEL: CPT

## 2021-04-13 PROCEDURE — 80069 RENAL FUNCTION PANEL: CPT

## 2021-04-13 PROCEDURE — 77010033678 HC OXYGEN DAILY

## 2021-04-13 PROCEDURE — 97530 THERAPEUTIC ACTIVITIES: CPT

## 2021-04-13 PROCEDURE — 85025 COMPLETE CBC W/AUTO DIFF WBC: CPT

## 2021-04-13 PROCEDURE — 74011250637 HC RX REV CODE- 250/637: Performed by: FAMILY MEDICINE

## 2021-04-13 PROCEDURE — 74011000250 HC RX REV CODE- 250: Performed by: INTERNAL MEDICINE

## 2021-04-13 RX ORDER — AZITHROMYCIN 500 MG/1
500 TABLET, FILM COATED ORAL DAILY
Status: DISCONTINUED | OUTPATIENT
Start: 2021-04-13 | End: 2021-04-18 | Stop reason: HOSPADM

## 2021-04-13 RX ORDER — SODIUM CHLORIDE 450 MG/100ML
50 INJECTION, SOLUTION INTRAVENOUS CONTINUOUS
Status: DISCONTINUED | OUTPATIENT
Start: 2021-04-13 | End: 2021-04-18

## 2021-04-13 RX ORDER — AMLODIPINE BESYLATE 5 MG/1
10 TABLET ORAL DAILY
Status: DISCONTINUED | OUTPATIENT
Start: 2021-04-14 | End: 2021-04-18 | Stop reason: HOSPADM

## 2021-04-13 RX ADMIN — DEXAMETHASONE SODIUM PHOSPHATE 4 MG: 4 INJECTION, SOLUTION INTRAMUSCULAR; INTRAVENOUS at 05:44

## 2021-04-13 RX ADMIN — SODIUM CHLORIDE 50 ML/HR: 4.5 INJECTION, SOLUTION INTRAVENOUS at 15:56

## 2021-04-13 RX ADMIN — CEFTRIAXONE SODIUM 1 G: 1 INJECTION, POWDER, FOR SOLUTION INTRAMUSCULAR; INTRAVENOUS at 09:34

## 2021-04-13 RX ADMIN — HEPARIN SODIUM 5000 UNITS: 5000 INJECTION INTRAVENOUS; SUBCUTANEOUS at 22:28

## 2021-04-13 RX ADMIN — AZITHROMYCIN MONOHYDRATE 500 MG: 500 TABLET ORAL at 12:13

## 2021-04-13 RX ADMIN — DEXAMETHASONE SODIUM PHOSPHATE 4 MG: 4 INJECTION, SOLUTION INTRAMUSCULAR; INTRAVENOUS at 18:48

## 2021-04-13 RX ADMIN — AMLODIPINE BESYLATE 5 MG: 5 TABLET ORAL at 12:13

## 2021-04-13 RX ADMIN — HEPARIN SODIUM 5000 UNITS: 5000 INJECTION INTRAVENOUS; SUBCUTANEOUS at 15:55

## 2021-04-13 RX ADMIN — HEPARIN SODIUM 5000 UNITS: 5000 INJECTION INTRAVENOUS; SUBCUTANEOUS at 05:45

## 2021-04-13 NOTE — PROGRESS NOTES
13: 40PM Inbound call from patient's daughter Nan Andujar. Patient identifier's (name, , and four digit code) verified per HIPAA guidelines. Daughter gave verbal consent to send referral for SNF [facility]). Referral sent via [facility]. Daughter is requesting a hospital bed for her mother at time of discharge. Will inform attending of this. LIZ Llamas              13:10PM Outbound call to patient's daughter Nan Andujar, @ [phone]. Identified self, role, and nature of the call. Informed Jacqueline Alexis, the discharge recommendation from PT/OT is SNF. Daughter voiced understanding, then voiced \"let me think about this and get back with you. \"  Provided direct contact information.           LIZ Llamas

## 2021-04-13 NOTE — CONSULTS
PULMONARY NOTE  [facility]    Name: Husam Barba MRN: [id number]   : 1928 Hospital: [address] [place] [address]   Date: 2021  Admission date: 2021 Hospital Day: 6       HPI:     Hospital Problems  Date Reviewed: 2020          Codes Class Noted POA    Pneumonia ICD-10-CM: J18.9  ICD-9-CM: 582  2021 Unknown        COVID-19 ICD-10-CM: U07.1  ICD-9-CM: 079.89  2021 Unknown                   [x] High complexity decision making was performed  [x] See my orders for details      Subjective/Initial History:     I was asked by Jessenia Michaud MD to see Husam Barba  a 80 y.o.  female in consultation     Excerpts from admission 2021 or consult notes as follows:   15-year-old lady came in because of shortness of breath she is a nursing home resident past medical history of COVID-19 pneumonia hypertension hypothyroidism chronic kidney disease and insomnia. She was not feeling well Covid test came back positive chest x-ray shows left lower lobe infiltrate so admitted and pulmonary consult was called for further evaluation      No Known Allergies     MAR reviewed and pertinent medications noted or modified as needed     Current Facility-Administered Medications   Medication    azithromycin (ZITHROMAX) tablet 500 mg    dexamethasone (DECADRON) 4 mg/mL injection 4 mg    amLODIPine (NORVASC) tablet 5 mg    0.9% sodium chloride infusion    acetaminophen (TYLENOL) tablet 650 mg    Or    acetaminophen (TYLENOL) suppository 650 mg    polyethylene glycol (MIRALAX) packet 17 g    ondansetron (ZOFRAN ODT) tablet 4 mg    Or    ondansetron (ZOFRAN) injection 4 mg    heparin (porcine) injection 5,000 Units    cefTRIAXone (ROCEPHIN) 1 g in sterile water (preservative free) 10 mL IV syringe      Patient PCP: Other, MD Calvin  PMH:  has a past medical history of Anemia, Chronic kidney disease, COVID-19, Hypertension, Hypothyroidism, and Insomnia.   PSH:   has no past surgical history on file. FHX: Family history is unknown by patient. SHX:  reports that she has never smoked. She has never used smokeless tobacco.     ROS:  Unable to obtain      Objective:     Vital Signs: Telemetry:    normal sinus rhythm Intake/Output:   Visit Vitals  BP (!) 146/99   Pulse 79   Temp 97.6 °F (36.4 °C)   Resp 20   Ht 5' 7\" (1.702 m)   Wt 63.5 kg (140 lb)   SpO2 96%   Breastfeeding Unknown   BMI 21.93 kg/m²       Temp (24hrs), Av.8 °F (36.6 °C), Min:97.6 °F (36.4 °C), Max:97.8 °F (36.6 °C)        O2 Device: Nasal cannula O2 Flow Rate (L/min): 2 l/min       Wt Readings from Last 4 Encounters:   21 63.5 kg (140 lb)   02/15/21 68 kg (150 lb)          Intake/Output Summary (Last 24 hours) at 2021 1004  Last data filed at 2021 1821  Gross per 24 hour   Intake    Output 1200 ml   Net -1200 ml       Last shift:      No intake/output data recorded. Last 3 shifts:  1901 -  0700  In: -   Out: 1200 [Urine:1200]       Physical Exam:     Physical Exam   Constitutional: She appears distressed. HENT:   Head: Normocephalic and atraumatic. Eyes: Pupils are equal, round, and reactive to light. Conjunctivae and EOM are normal.   Neck: Normal range of motion. Neck supple. Cardiovascular: Normal rate and regular rhythm. Pulmonary/Chest: Effort normal. She has rales. Musculoskeletal: Normal range of motion. Neurological: She is alert. Labs:    No results for input(s): WBC, HGB, PLT, INR, APTT, HGBEXT, PLTEXT, INREXT, HGBEXT, PLTEXT, INREXT in the last 72 hours. No lab exists for component: FIB, DDMER  Recent Labs     21  0550 21  0705   * 144   K 4.4 4.6   * 112*   CO2 21 23   * 188*   BUN 58* 63*   CREA 2.63* 3.01*   CA 8.5 8.3*  8.4*   PHOS 3.0 3.3   ALB 2.4* 2.3*     No results for input(s): PH, PCO2, PO2, HCO3, FIO2 in the last 72 hours.   Recent Labs     21  0705        No results found for: BNPP, BNP   Lab Results Component Value Date/Time    Culture result: No Growth (<1000 cfu/mL) 04/08/2021 08:00 PM   No results found for: TSH, TSHEXT, TSHEXT    Imaging:    CXR Results  (Last 48 hours)    None        Results from [place] encounter on 04/08/21   XR CHEST SNGL V    Narrative History: Sepsis? Single view of the chest was obtained. Heart size is enlarged. This is an  increased patchy airspace disease in the lung bases especially on the left  retrocardiac region. Upper lungs are clear. No effusions or pneumothorax. Bony  structures appear osteopenic. There are degenerative changes. No acute bony  abnormality. Pression: Changes consistent with left lower lobe infiltrate. Follow-up chest  x-rays recommended. Results from [place] encounter on 02/15/21   CT SPINE CERV WO CONT    Narrative Fall. Comparison CT of the cervical spine 6/13/2016. Technique: Axial images cervical spine with sagittal and coronal reformats. 3-D  MIPs. Dose reduction: All CT scans at this facility are performed using dose reduction  optimization techniques as appropriate to a performed exam including the  following: Automated exposure control, adjustments of the mA and/or kV according  to patient's size, or use of iterative reconstruction technique. FINDINGS: The bones are demineralized which limits evaluation for fracture. No  listhesis. C6, C7, T1 vertebral body compressions are unchanged. T4 vertebral  body almost complete compression appears nonacute but has occurred in the  interim. No evident acute fracture, jumped or perched facet. Multilevel disc  greater than placenta narrowing, sclerosis, osteophytosis. 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 Lateral pillars similar alignment as previous. No prevertebral soft tissue  swelling. Lung apices clear. Impression 1. No acute bony findings. 2. T4 vertebral body compression has occurred in the interim but appears  nonacute. Correlate for any focal pain. IMPRESSION:   1. Acute hypoxic respiratory failure  2. COVID-19 pneumonia  3. Chronic kidney disease  4. Hypertension and insomnia by history  5. Prognosis guarded       RECOMMENDATIONS/PLAN:     1. Oxygen 1 Liter via nasal Cannula will start weaning   2. Patient is on Decadron Zithromax  Rocephin and heparin will decrease Decadron   3. Chest x-ray shows left lower lobe infiltrate  4.  Continue with IV fluid          Charity Cervantes MD

## 2021-04-13 NOTE — PROGRESS NOTES
OCCUPATIONAL THERAPY EVALUATION  Patient: Andrae Cárdenas (73 y.o. female)  Date: 4/13/2021  Primary Diagnosis: Pneumonia [J18.9]  COVID-19 [U07.1]        Precautions: falls, dementia, COVID +    ASSESSMENT  Pt is a 79 yo female admitted on 4/8/2021 for SOB; xray showed PNA, pt positive for COVID 19. PMH: anemia, CKD, HTN, hypothyroidism, insomnia. Pt A&O x name only. Per pt medical records pt resides with daughter, in a 1 story home with 3 OMAR. Pt requires assistance for ADLs and IADLs, pt doesn't drive. DME at home SPC, WC, RW, and rollator. Based on the objective data described below, the patient presents with confusion but able to follow simple commands, decreased activity tolerance, fair sitting balance with support on rails, generalized weakness, generalized deconditioning, poor safety awareness, increased need for A with self care and functional mobility/transfers. Patient semi supine in bed upon OT/PT arrival and agreeable to working with therapy. Patient A&O to name only and unable to provide home environment/PLOF information. Patient mod A x2 bed mobility, sup <> sit, and scooting. Patient attempted sit <> stand x2 with max A x2 but was unable to reach full standing. Patient sat at EOB ~15 minutes and max A feeding while sitting EOB, patient ate 3/4 of oatmeal and nursing updated. Patient mod A simple grooming task to wash face sitting EOB. Patient total A LE dressing while sitting EOB. Patient would benefit from continued skilled OT services to address above deficits and improve safety and independence with self care and functional mobility/transfers. Recommend discharge to SNF when medically appropriate. Current Level of Function Impacting Discharge (ADLs/self-care): total A LE dressing, mod A grooming, max A feeding.     Other factors to consider for discharge: time since onset, PLOF, severity of deficits, family support        PLAN :  Recommendations and Planned Interventions: self care training, functional mobility training, therapeutic exercise, balance training, therapeutic activities, endurance activities, patient education, home safety training and family training/education    Frequency/Duration: Patient will be followed by occupational therapy 4 times a week to address goals. Recommendation for discharge: (in order for the patient to meet his/her long term goals)  SNF    This discharge recommendation:  Has been made in collaboration with the attending provider and/or case management    IF patient discharges home will need the following DME: TBD       SUBJECTIVE:   Patient stated yes.  When asked if she lives with family. Patient with limited verbalization throughout session. OBJECTIVE DATA SUMMARY:   HISTORY:   Past Medical History:   Diagnosis Date    Anemia     Chronic kidney disease     COVID-19     Hypertension     Hypothyroidism     Insomnia    History reviewed. No pertinent surgical history. Expanded or extensive additional review of patient history:     Home Situation  Home Environment: Private residence  [address] [facility] [address] [facility]: [facility]  # Steps to Enter: 3  One/Two Story Residence: One story  Living Alone: No  Support Systems: Child(lena), Family member(s)  Patient Expects to be Discharged to[de-identified] Unknown  Current DME Used/Available at Home: Cane, straight, Walker, rolling, Walker, rollator, Wheelchair    PLOF: Pt A for ADLS/IADLS, unclear of level of A required for mobility prior to admission. Hand dominance: Unknown    EXAMINATION OF PERFORMANCE DEFICITS:  Cognitive/Behavioral Status:  Neurologic State: Drowsy; Confused  Orientation Level: Other (Comment)(oriented to name only)  Cognition: Decreased command following;Memory loss  Safety/Judgement: Decreased awareness of environment    Skin: per chart, sacral wound present    Edema: none noted    Hearing:   Auditory  Auditory Impairment: Hard of hearing, bilateral    Vision/Perceptual:    Unable to formally assess at this time    Range of Motion:  AROM: Generally decreased, functional    Strength:  Strength: Generally decreased, functional     RUE Strength  Observation: grossly observed to be 2+/5     LUE Strength  Observation: grossly observed to be 2+/5    Coordination:  Generally impaired, not formally assessed    Tone & Sensation:  Tone: Normal    Balance:  Sitting: Impaired; With support  Sitting - Static: Fair (occasional)  Sitting - Dynamic: Fair (occasional)  Standing: Impaired  Standing - Static: Poor;Constant support    Functional Mobility and Transfers for ADLs:  Bed Mobility:  Rolling: Moderate assistance;Assist x2  Supine to Sit: Moderate assistance;Assist x2  Sit to Supine: Moderate assistance;Assist x2  Scooting: Moderate assistance;Assist x2    Transfers:  Sit to Stand: Maximum assistance;Assist x2(unable to reach full stand)  Stand to Sit: Maximum assistance;Assist x2(unable to reach full stand)  Assistive Device : Gait Belt    ADL Assessment:  Feeding: Maximum assistance    Oral Facial Hygiene/Grooming: Moderate assistance    Lower Body Dressing: Total assistance    ADL Intervention and task modifications:  Feeding  Feeding Assistance: Maximum assistance  Container Management: Total assistance (dependent)  Food to Mouth: Maximum assistance  Drink to Mouth: Maximum assistance    Grooming  Grooming Assistance: Moderate assistance  Position Performed: Seated edge of bed  Washing Face: Moderate assistance    Lower Body Dressing Assistance  Dressing Assistance: Total assistance(dependent)  Socks:  Total assistance (dependent)  Position Performed: Seated edge of bed    Cognitive Retraining  Safety/Judgement: Decreased awareness of environment    Therapeutic Exercise:  Patient may benefit from UE HEP when increase in command following     Functional Measure:    [employer] AM-PACTM \"6 Clicks\"                                                       Daily Activity Inpatient Short Form  How much help from another person does the patient currently need. .. Total; A Lot A Little None   1. Putting on and taking off regular lower body clothing? [x]  1 []  2 []  3 []  4   2. Bathing (including washing, rinsing, drying)? []  1 [x]  2 []  3 []  4   3. Toileting, which includes using toilet, bedpan or urinal? [x] 1 []  2 []  3 []  4   4. Putting on and taking off regular upper body clothing? []  1 [x]  2 []  3 []  4   5. Taking care of personal grooming such as brushing teeth? []  1 [x]  2 []  3 []  4   6. Eating meals? []  1 [x]  2 []  3 []  4   © , Trustees of [address] [place] [address] [address] [id number], under license to [employer]. All rights reserved     Score: 10/24     Interpretation of Tool:  Represents clinically-significant functional categories (i.e. Activities of daily living). Percentage of Impairment CH    0%   CI    1-19% CJ    20-39% CK    40-59% CL    60-79% CM    80-99% CN     100%   [facility]  Score 6-24 24 23 20-22 15-19 10-14 7-9 6        Occupational Therapy Evaluation Charge Determination   History Examination Decision-Making   MEDIUM Complexity : Expanded review of history including physical, cognitive and psychosocial  history  MEDIUM Complexity : 3-5 performance deficits relating to physical, cognitive , or psychosocial skils that result in activity limitations and / or participation restrictions MEDIUM Complexity : Patient may present with comorbidities that affect occupational performnce.  Miniml to moderate modification of tasks or assistance (eg, physical or verbal ) with assesment(s) is necessary to enable patient to complete evaluation       Based on the above components, the patient evaluation is determined to be of the following complexity level: MEDIUM    Pain Ratin/10    Activity Tolerance:   Fair, desaturates with exertion and requires oxygen and requires rest breaks    After treatment patient left in no apparent distress:    Supine in bed, Call bell within reach, Caregiver / family present and Side rails x 3    COMMUNICATION/EDUCATION:   The patients plan of care was discussed with: Physical therapist and Registered nurse. Patient is unable to participate in goal setting and plan of care. This patients plan of care is appropriate for delegation to [place]. OT/PT sessions occurred together for increased patient and clinician safety as pt requires A of 2 for mobility at this time.     Problem: Self Care Deficits Care Plan (Adult)  Goal: *Acute Goals and Plan of Care (Insert Text)  Description: Pt will be CGA sup <> sit in prep for EOB ADLs  Pt will be SBA grooming sitting EOB  Pt will be mod A LE dressing sitting EOB/long sit  Pt will be min A sit <>  prep for toileting LRAD  Pt will be mod A toileting/toilet transfer/cloth mgmt LRAD  Pt will be SBA following UE HEP in prep for self care tasks     Outcome: Not Met     Thank you for this referral.  Janet Roe, OTR/L  Time Calculation: 31 mins

## 2021-04-13 NOTE — PROGRESS NOTES
General Daily Progress Note          Patient Name:   Husam Barba       YOB: 1928       Age:  80 y.o. Admit Date: 4/8/2021      Subjective:         Patient alert awake not in distress        Objective:     Visit Vitals  BP (!) 146/99   Pulse 79   Temp 97.6 °F (36.4 °C)   Resp 20   Ht 5' 7\" (1.702 m)   Wt 63.5 kg (140 lb)   SpO2 95%   Breastfeeding Unknown   BMI 21.93 kg/m²        Recent Results (from the past 24 hour(s))   CBC WITH AUTOMATED DIFF    Collection Time: 04/13/21  9:05 AM   Result Value Ref Range    WBC 7.0 3.6 - 11.0 K/uL    RBC 4.64 3.80 - 5.20 M/uL    HGB 10.9 (L) 11.5 - 16.0 g/dL    HCT 35.1 35.0 - 47.0 %    MCV 75.6 (L) 80.0 - 99.0 FL    MCH 23.5 (L) 26.0 - 34.0 PG    MCHC 31.1 30.0 - 36.5 g/dL    RDW 19.1 (H) 11.5 - 14.5 %    PLATELET 140 (H) 519 - 400 K/uL    NEUTROPHILS 87 (H) 32 - 75 %    LYMPHOCYTES 9 (L) 12 - 49 %    MONOCYTES 4 (L) 5 - 13 %    EOSINOPHILS 0 0 - 7 %    BASOPHILS 0 0 - 1 %    IMMATURE GRANULOCYTES 4 (H) 0.0 - 0.5 %    ABS. NEUTROPHILS 6.1 1.8 - 8.0 K/UL    ABS. LYMPHOCYTES 0.6 (L) 0.8 - 3.5 K/UL    ABS. MONOCYTES 0.3 0.0 - 1.0 K/UL    ABS. EOSINOPHILS 0.0 0.0 - 0.4 K/UL    ABS. BASOPHILS 0.0 0.0 - 0.1 K/UL    ABS. IMM. GRANS. 0.3 (H) 0.00 - 0.04 K/UL    DF AUTOMATED     METABOLIC PANEL, COMPREHENSIVE    Collection Time: 04/13/21  9:05 AM   Result Value Ref Range    Sodium 148 (H) 136 - 145 mmol/L    Potassium 4.3 3.5 - 5.1 mmol/L    Chloride 118 (H) 97 - 108 mmol/L    CO2 22 21 - 32 mmol/L    Anion gap 8 5 - 15 mmol/L    Glucose 203 (H) 65 - 100 mg/dL    BUN 53 (H) 6 - 20 mg/dL    Creatinine 2.24 (H) 0.55 - 1.02 mg/dL    BUN/Creatinine ratio 24 (H) 12 - 20      GFR est AA 25 (L) >60 ml/min/1.73m2    GFR est non-AA 20 (L) >60 ml/min/1.73m2    Calcium 8.6 8.5 - 10.1 mg/dL    Bilirubin, total 0.3 0.2 - 1.0 mg/dL    AST (SGOT) 14 (L) 15 - 37 U/L    ALT (SGPT) 16 12 - 78 U/L    Alk.  phosphatase 48 45 - 117 U/L    Protein, total 6.9 6.4 - 8.2 g/dL    Albumin 2.4 (L) 3.5 - 5.0 g/dL    Globulin 4.5 (H) 2.0 - 4.0 g/dL    A-G Ratio 0.5 (L) 1.1 - 2.2     RENAL FUNCTION PANEL    Collection Time: 04/13/21  9:05 AM   Result Value Ref Range    Sodium 148 (H) 136 - 145 mmol/L    Potassium 4.2 3.5 - 5.1 mmol/L    Chloride 118 (H) 97 - 108 mmol/L    CO2 22 21 - 32 mmol/L    Anion gap 8 5 - 15 mmol/L    Glucose 205 (H) 65 - 100 mg/dL    BUN 51 (H) 6 - 20 mg/dL    Creatinine 2.23 (H) 0.55 - 1.02 mg/dL    BUN/Creatinine ratio 23 (H) 12 - 20      GFR est AA 25 (L) >60 ml/min/1.73m2    GFR est non-AA 21 (L) >60 ml/min/1.73m2    Calcium 8.6 8.5 - 10.1 mg/dL    Phosphorus 2.7 2.6 - 4.7 mg/dL    Albumin 2.4 (L) 3.5 - 5.0 g/dL     [unfilled]      Review of Systems    Constitutional: Negative for chills and fever. HENT: Negative. Eyes: Negative. Respiratory: Negative. Cardiovascular: Negative. Gastrointestinal: Negative for abdominal pain and nausea. Skin: Negative. Neurological: Negative. Physical Exam:      Constitutional: pt is oriented to person, place, and time. HENT:   Head: Normocephalic and atraumatic. Eyes: Pupils are equal, round, and reactive to light. EOM are normal.   Cardiovascular: Normal rate, regular rhythm and normal heart sounds. Pulmonary/Chest: Breath sounds normal. No wheezes. No rales. Exhibits no tenderness. Abdominal: Soft. Bowel sounds are normal. There is no abdominal tenderness. There is no rebound and no guarding. Musculoskeletal: Normal range of motion. Neurological: pt is alert and oriented to person, place, and time.      XR CHEST SNGL V   Final Result           Recent Results (from the past 24 hour(s))   CBC WITH AUTOMATED DIFF    Collection Time: 04/13/21  9:05 AM   Result Value Ref Range    WBC 7.0 3.6 - 11.0 K/uL    RBC 4.64 3.80 - 5.20 M/uL    HGB 10.9 (L) 11.5 - 16.0 g/dL    HCT 35.1 35.0 - 47.0 %    MCV 75.6 (L) 80.0 - 99.0 FL    MCH 23.5 (L) 26.0 - 34.0 PG    MCHC 31.1 30.0 - 36.5 g/dL    RDW 19.1 (H) 11.5 - 14.5 %    PLATELET 264 (H) 810 - 400 K/uL    NEUTROPHILS 87 (H) 32 - 75 %    LYMPHOCYTES 9 (L) 12 - 49 %    MONOCYTES 4 (L) 5 - 13 %    EOSINOPHILS 0 0 - 7 %    BASOPHILS 0 0 - 1 %    IMMATURE GRANULOCYTES 4 (H) 0.0 - 0.5 %    ABS. NEUTROPHILS 6.1 1.8 - 8.0 K/UL    ABS. LYMPHOCYTES 0.6 (L) 0.8 - 3.5 K/UL    ABS. MONOCYTES 0.3 0.0 - 1.0 K/UL    ABS. EOSINOPHILS 0.0 0.0 - 0.4 K/UL    ABS. BASOPHILS 0.0 0.0 - 0.1 K/UL    ABS. IMM. GRANS. 0.3 (H) 0.00 - 0.04 K/UL    DF AUTOMATED     METABOLIC PANEL, COMPREHENSIVE    Collection Time: 04/13/21  9:05 AM   Result Value Ref Range    Sodium 148 (H) 136 - 145 mmol/L    Potassium 4.3 3.5 - 5.1 mmol/L    Chloride 118 (H) 97 - 108 mmol/L    CO2 22 21 - 32 mmol/L    Anion gap 8 5 - 15 mmol/L    Glucose 203 (H) 65 - 100 mg/dL    BUN 53 (H) 6 - 20 mg/dL    Creatinine 2.24 (H) 0.55 - 1.02 mg/dL    BUN/Creatinine ratio 24 (H) 12 - 20      GFR est AA 25 (L) >60 ml/min/1.73m2    GFR est non-AA 20 (L) >60 ml/min/1.73m2    Calcium 8.6 8.5 - 10.1 mg/dL    Bilirubin, total 0.3 0.2 - 1.0 mg/dL    AST (SGOT) 14 (L) 15 - 37 U/L    ALT (SGPT) 16 12 - 78 U/L    Alk.  phosphatase 48 45 - 117 U/L    Protein, total 6.9 6.4 - 8.2 g/dL    Albumin 2.4 (L) 3.5 - 5.0 g/dL    Globulin 4.5 (H) 2.0 - 4.0 g/dL    A-G Ratio 0.5 (L) 1.1 - 2.2     RENAL FUNCTION PANEL    Collection Time: 04/13/21  9:05 AM   Result Value Ref Range    Sodium 148 (H) 136 - 145 mmol/L    Potassium 4.2 3.5 - 5.1 mmol/L    Chloride 118 (H) 97 - 108 mmol/L    CO2 22 21 - 32 mmol/L    Anion gap 8 5 - 15 mmol/L    Glucose 205 (H) 65 - 100 mg/dL    BUN 51 (H) 6 - 20 mg/dL    Creatinine 2.23 (H) 0.55 - 1.02 mg/dL    BUN/Creatinine ratio 23 (H) 12 - 20      GFR est AA 25 (L) >60 ml/min/1.73m2    GFR est non-AA 21 (L) >60 ml/min/1.73m2    Calcium 8.6 8.5 - 10.1 mg/dL    Phosphorus 2.7 2.6 - 4.7 mg/dL    Albumin 2.4 (L) 3.5 - 5.0 g/dL       Results     Procedure Component Value Units Date/Time    COVID-19 RAPID TEST [[id number]]  (Abnormal) Collected: 04/09/21 0428    Order Status: Completed Specimen: Nasopharyngeal Updated: 04/09/21 0553     Specimen source Nasopharyngeal        COVID-19 rapid test DETECTED        Comment: Results verified, phoned to and read back by Joselito Nielsen RN @ 0553/W3 Rapid [employer] ID Now   The specimen is POSITIVE for SARS-CoV-2, the novel coronavirus associated with COVID-19. This test has been authorized by the FDA under an Emergency Use Authorization (EUA) for use by authorized laboratories. Fact sheet for Healthcare Providers: dINKco.nz Fact sheet for Patients: Shuame[employer]nz   Methodology: Isothermal Nucleic Acid   Amplification         CULTURE, URINE [[id number]] Collected: 04/08/21 2000    Order Status: Completed Specimen: Urine Updated: 04/10/21 1143     Special Requests: --        No Special Requests  Reflexed from [id number]       Culture result: No Growth (<1000 cfu/mL)              Labs:     Recent Labs     04/13/21  0905   WBC 7.0   HGB 10.9*   HCT 35.1   *     Recent Labs     04/13/21  0905 04/12/21  0550 04/11/21  0705   *  148* 146* 144   K 4.3  4.2 4.4 4.6   *  118* 117* 112*   CO2 22  22 21 23   BUN 53*  51* 58* 63*   CREA 2.24*  2.23* 2.63* 3.01*   *  205* 194* 188*   CA 8.6  8.6 8.5 8.3*  8.4*   PHOS 2.7 3.0 3.3     Recent Labs     04/13/21  0905 04/12/21  0550 04/11/21  0705   ALT 16  --   --    AP 48  --   --    TBILI 0.3  --   --    TP 6.9  --   --    ALB 2.4*  2.4* 2.4* 2.3*   GLOB 4.5*  --   --      No results for input(s): INR, PTP, APTT, INREXT, INREXT in the last 72 hours. No results for input(s): FE, TIBC, PSAT, FERR in the last 72 hours. No results found for: FOL, RBCF   No results for input(s): PH, PCO2, PO2 in the last 72 hours.   Recent Labs     04/11/21  0705        No results found for: CHOL, CHOLX, CHLST, CHOLV, HDL, HDLP, LDL, LDLC, DLDLP, TGLX, TRIGL, TRIGP, CHHD, CHHDX  Lab Results Component Value Date/Time    Glucose (POC) 205 (H) 04/12/2021 11:07 AM    Glucose (POC) 245 (H) 04/12/2021 08:02 AM    Glucose (POC) 266 (H) 04/10/2021 07:40 PM    Glucose (POC) 366 (H) 04/10/2021 02:33 PM    Glucose (POC) 280 (H) 04/10/2021 11:07 AM     Lab Results   Component Value Date/Time    Color Yellow 04/08/2021 08:00 PM    Appearance Turbid (A) 04/08/2021 08:00 PM    Specific gravity 1.015 04/08/2021 08:00 PM    pH (UA) 5.0 04/08/2021 08:00 PM    Protein 100 (A) 04/08/2021 08:00 PM    Glucose Negative 04/08/2021 08:00 PM    Ketone Negative 04/08/2021 08:00 PM    Bilirubin Negative 04/08/2021 08:00 PM    Urobilinogen 0.1 04/08/2021 08:00 PM    Nitrites Negative 04/08/2021 08:00 PM    Leukocyte Esterase Trace (A) 04/08/2021 08:00 PM    Bacteria 4+ (A) 04/08/2021 08:00 PM    WBC 20-50 04/08/2021 08:00 PM    RBC 0-5 04/08/2021 08:00 PM         Assessment:          COVID-19 pneumonitis  Chronic kidney disease  Hypertension  Insomnia    Plan:        On Zithromax and Rocephin IV  On Decadron 4 mg every 12 hours  On heparin 5000 subcu every 8 hours  IV fluids  Monitor renal function  Repeat the labs and PT OT consult  Discussed with the  patient planning to go to skilled care rehab    Current Facility-Administered Medications:     azithromycin (ZITHROMAX) tablet 500 mg, 500 mg, Oral, DAILY, Reynaldo Garcia MD, 500 mg at 04/13/21 1213    dexamethasone (DECADRON) 4 mg/mL injection 4 mg, 4 mg, IntraVENous, Q12H, Torrey Woodruff MD, 4 mg at 04/13/21 0544    amLODIPine (NORVASC) tablet 5 mg, 5 mg, Oral, DAILY, Yoseph Tanner MD, 5 mg at 04/13/21 1213    0.9% sodium chloride infusion, 100 mL/hr, IntraVENous, CONTINUOUS, Elfego Denson MD, Last Rate: 100 mL/hr at 04/11/21 2224, 100 mL/hr at 04/11/21 2224    acetaminophen (TYLENOL) tablet 650 mg, 650 mg, Oral, Q6H PRN **OR** acetaminophen (TYLENOL) suppository 650 mg, 650 mg, Rectal, Q6H PRN, Miracle Garcia MD    polyethylene glycol (MIRALAX) packet 17 g, 17 g, Oral, DAILY PRN, Alanis Garcia MD    ondansetron (ZOFRAN ODT) tablet 4 mg, 4 mg, Oral, Q8H PRN **OR** ondansetron (ZOFRAN) injection 4 mg, 4 mg, IntraVENous, Q6H PRN, Reynaldo Garcia MD    heparin (porcine) injection 5,000 Units, 5,000 Units, SubCUTAneous, Q8H, Reynaldo Garcia MD, 5,000 Units at 04/13/21 0445    cefTRIAXone (ROCEPHIN) 1 g in sterile water (preservative free) 10 mL IV syringe, 1 g, IntraVENous, Q24H, Reynaldo Garcia MD, 1 g at 04/13/21 7326

## 2021-04-13 NOTE — PROGRESS NOTES
Problem: Pressure Injury - Risk of  Goal: *Prevention of pressure injury  Description: Document Leo Scale and appropriate interventions in the flowsheet. Outcome: Progressing Towards Goal  Note: Pressure Injury Interventions:  Sensory Interventions: Assess changes in LOC    Moisture Interventions: Absorbent underpads    Activity Interventions: PT/OT evaluation    Mobility Interventions: PT/OT evaluation    Nutrition Interventions: Document food/fluid/supplement intake    Friction and Shear Interventions: HOB 30 degrees or less                Problem: Patient Education: Go to Patient Education Activity  Goal: Patient/Family Education  Outcome: Progressing Towards Goal     Problem: Falls - Risk of  Goal: *Absence of Falls  Description: Document Gavin Fall Risk and appropriate interventions in the flowsheet. Outcome: Progressing Towards Goal  Note: Fall Risk Interventions:       Mentation Interventions: Evaluate medications/consider consulting pharmacy    Medication Interventions: Evaluate medications/consider consulting pharmacy    Elimination Interventions: Toileting schedule/hourly rounds              Problem: Falls - Risk of  Goal: *Absence of Falls  Description: Document Gavin Fall Risk and appropriate interventions in the flowsheet. Outcome: Progressing Towards Goal  Note: Fall Risk Interventions:       Mentation Interventions: Evaluate medications/consider consulting pharmacy    Medication Interventions: Evaluate medications/consider consulting pharmacy    Elimination Interventions:  Toileting schedule/hourly rounds              Problem: Patient Education: Go to Patient Education Activity  Goal: Patient/Family Education  Outcome: Progressing Towards Goal     Problem: Impaired Skin Integrity/Pressure Injury Treatment  Goal: *Improvement of Existing Pressure Injury  Outcome: Progressing Towards Goal  Goal: *Prevention of pressure injury  Description: Document Leo Scale and appropriate interventions in the flowsheet.   Outcome: Progressing Towards Goal  Note: Pressure Injury Interventions:  Sensory Interventions: Assess changes in LOC    Moisture Interventions: Absorbent underpads    Activity Interventions: PT/OT evaluation    Mobility Interventions: PT/OT evaluation    Nutrition Interventions: Document food/fluid/supplement intake    Friction and Shear Interventions: HOB 30 degrees or less                Problem: Patient Education: Go to Patient Education Activity  Goal: Patient/Family Education  Outcome: Progressing Towards Goal

## 2021-04-13 NOTE — PROGRESS NOTES
Renal progress Note    NAME:  Lila Sparks   :   1928   MRN:   [id number]     ATTENDING: Concha Wu MD  PCP:  Shirley Peguero MD    Date/Time:  2021 2:25 PM      Subjective:   Patient seen . Seems to be in no distress. Creatinine has improved to 2.2. Past Medical History:   Diagnosis Date    Anemia     Chronic kidney disease     COVID-19     Hypertension     Hypothyroidism     Insomnia       History reviewed. No pertinent surgical history. Social History     Tobacco Use    Smoking status: Never Smoker    Smokeless tobacco: Never Used   Substance Use Topics    Alcohol use: Not on file      Family History   Family history unknown: Yes       No Known Allergies   Prior to Admission medications    Medication Sig Start Date End Date Taking? Authorizing Provider   ergocalciferol, vitamin D2, (VITAMIN D2 PO) Take  by mouth. Provider, Historical   trazodone HCl (TRAZODONE PO) Take  by mouth. Provider, Historical   LOSARTAN PO Take  by mouth. Provider, Historical   OXCARBAZEPINE PO Take  by mouth. Provider, Historical       REVIEW OF SYSTEMS:       Constitutional: Negative for chills and fever. HENT: Negative. Eyes: Negative. Respiratory: As above   cardiovascular: Negative. Gastrointestinal: Negative for abdominal pain and nausea. Skin: Negative. Neurological: Negative. Objective:   VITALS:    Visit Vitals  BP (!) 146/99   Pulse 79   Temp 97.6 °F (36.4 °C)   Resp 20   Ht 5' 7\" (1.702 m)   Wt 63.5 kg (140 lb)   SpO2 95%   Breastfeeding Unknown   BMI 21.93 kg/m²     Temp (24hrs), Av.7 °F (36.5 °C), Min:97.6 °F (36.4 °C), Max:97.8 °F (36.6 °C)      PHYSICAL EXAM:   General:    Alert, cooperative, no distress.      HEENT: Atraumatic, anicteric sclerae, pink conjunctivae     No oral ulcers, mucosa moist, throat clear  Neck:  Supple, symmetrical,  thyroid: non tender  Lungs:   Wheezing and rhonchi are heard chest wall:  No tenderness  No Accessory muscle use.  Heart:   Regular  rhythm,  No  murmur   No edema  Abdomen:   Soft, non-tender. Bowel sounds normal  Extremities: No cyanosis. No clubbing  Skin:     Not pale. Not Jaundiced  No rashes   Psych:  Not anxious or agitated. Neurologic: Alert     LAB DATA REVIEWED:    Recent Results (from the past 24 hour(s))   CBC WITH AUTOMATED DIFF    Collection Time: 04/13/21  9:05 AM   Result Value Ref Range    WBC 7.0 3.6 - 11.0 K/uL    RBC 4.64 3.80 - 5.20 M/uL    HGB 10.9 (L) 11.5 - 16.0 g/dL    HCT 35.1 35.0 - 47.0 %    MCV 75.6 (L) 80.0 - 99.0 FL    MCH 23.5 (L) 26.0 - 34.0 PG    MCHC 31.1 30.0 - 36.5 g/dL    RDW 19.1 (H) 11.5 - 14.5 %    PLATELET 056 (H) 208 - 400 K/uL    NEUTROPHILS 87 (H) 32 - 75 %    LYMPHOCYTES 9 (L) 12 - 49 %    MONOCYTES 4 (L) 5 - 13 %    EOSINOPHILS 0 0 - 7 %    BASOPHILS 0 0 - 1 %    IMMATURE GRANULOCYTES 4 (H) 0.0 - 0.5 %    ABS. NEUTROPHILS 6.1 1.8 - 8.0 K/UL    ABS. LYMPHOCYTES 0.6 (L) 0.8 - 3.5 K/UL    ABS. MONOCYTES 0.3 0.0 - 1.0 K/UL    ABS. EOSINOPHILS 0.0 0.0 - 0.4 K/UL    ABS. BASOPHILS 0.0 0.0 - 0.1 K/UL    ABS. IMM. GRANS. 0.3 (H) 0.00 - 0.04 K/UL    DF AUTOMATED     METABOLIC PANEL, COMPREHENSIVE    Collection Time: 04/13/21  9:05 AM   Result Value Ref Range    Sodium 148 (H) 136 - 145 mmol/L    Potassium 4.3 3.5 - 5.1 mmol/L    Chloride 118 (H) 97 - 108 mmol/L    CO2 22 21 - 32 mmol/L    Anion gap 8 5 - 15 mmol/L    Glucose 203 (H) 65 - 100 mg/dL    BUN 53 (H) 6 - 20 mg/dL    Creatinine 2.24 (H) 0.55 - 1.02 mg/dL    BUN/Creatinine ratio 24 (H) 12 - 20      GFR est AA 25 (L) >60 ml/min/1.73m2    GFR est non-AA 20 (L) >60 ml/min/1.73m2    Calcium 8.6 8.5 - 10.1 mg/dL    Bilirubin, total 0.3 0.2 - 1.0 mg/dL    AST (SGOT) 14 (L) 15 - 37 U/L    ALT (SGPT) 16 12 - 78 U/L    Alk.  phosphatase 48 45 - 117 U/L    Protein, total 6.9 6.4 - 8.2 g/dL    Albumin 2.4 (L) 3.5 - 5.0 g/dL    Globulin 4.5 (H) 2.0 - 4.0 g/dL    A-G Ratio 0.5 (L) 1.1 - 2.2     RENAL FUNCTION PANEL    Collection Time: 04/13/21  9:05 AM   Result Value Ref Range    Sodium 148 (H) 136 - 145 mmol/L    Potassium 4.2 3.5 - 5.1 mmol/L    Chloride 118 (H) 97 - 108 mmol/L    CO2 22 21 - 32 mmol/L    Anion gap 8 5 - 15 mmol/L    Glucose 205 (H) 65 - 100 mg/dL    BUN 51 (H) 6 - 20 mg/dL    Creatinine 2.23 (H) 0.55 - 1.02 mg/dL    BUN/Creatinine ratio 23 (H) 12 - 20      GFR est AA 25 (L) >60 ml/min/1.73m2    GFR est non-AA 21 (L) >60 ml/min/1.73m2    Calcium 8.6 8.5 - 10.1 mg/dL    Phosphorus 2.7 2.6 - 4.7 mg/dL    Albumin 2.4 (L) 3.5 - 5.0 g/dL       Recommendations/Plan:       #1 acute kidney injury on CKD 3:  -presented with a creatinine of 3.2 which has improved to 2.2, BUN 53  -Baseline creatinine seems to be 1.8 based on labs in February 2021  -TAMIKO could be prerenal secondary to losartan which I will continue to hold  -Urine is bland. 2.5 g of proteinuria  -will keep on IVF    #2 Hypertension:  -Blood pressure is elevated. -Continue to hold losartan  -will increase amlodipine 10 mg daily     #3 anemia  -Hemoglobin 9.3-->10.9.    -Likely anemia of chronic kidney disease. #4 Covid pneumonia  -Patient presented with shortness of breath. Covid positive  -Has been started on dexamethasone azithromycin and Rocephin  -no respiratory distress for now    #5 renal osteodystrophy  -Pending intact parathyroid hormone and 25-hydroxy vitamin D level.    -Calcium is okay we will check phosphorus level    #6 UTI  -Urine is suggestive of UTI.   She has been started on Rocephin          ________________________________________________________________________  Signed: Chris Babin MD

## 2021-04-13 NOTE — PROGRESS NOTES
PHYSICAL THERAPY EVALUATION  Patient: Munira Merrill (21 y.o. female)  Date: 4/13/2021  Primary Diagnosis: Pneumonia [J18.9]  COVID-19 [U07.1]        Precautions: falls, droplet plus (COVID 23 +)       ASSESSMENT  Pt is a 81 yo female admitted on 4/8/2021 for SOB; xray showed PNA, pt positive for COVID 19. PMH: anemia, CKD, HTN, hypothyroidism, insomnia. Pt A&O x name only. Per pt medical records pt resides with daughter, in a 1 story home with 3 OMAR. Pt requires assistance for ADLs and IADLs, pt doesn't drive. DME at home SPC, WC, RW, and rollator. Based on the objective data described below, the patient presents with generalized weakness, impaired functional mobility, impaired amb, impaired balance, confusion, poor safety awareness, and decreased activity tolerance. Pt semi-supine in bed upon PT/OT arrival, agreeable to evaluation. Pt required mod A x2 for bed mobility, mod A x2 supine <> sit, max A x2 sit <> stand transfers, pt unable to clear buttock from bed x 3 attempts. Pt was able to sit EOB x 10 min to eat breakfast with OT, initially requiring mod A for upright position but able to progress to SBA with no UE support. Pt did fair with session today currently requiring increased need for assistance with mobility. Pt will benefit from continued skilled PT to address above deficits and return to PLOF. Current PT DC recommendation SNF. Current Level of Function Impacting Discharge (mobility/balance): mod to max A x2    Other factors to consider for discharge: severity of deficits, COVID +      PLAN :  Recommendations and Planned Interventions: bed mobility training, transfer training, gait training, therapeutic exercises, patient and family training/education and therapeutic activities      Frequency/Duration: Patient will be followed by physical therapy:  4 times a week to address goals.     Recommendation for discharge: (in order for the patient to meet his/her long term goals)  SNF    This discharge recommendation:  Has been made in collaboration with the attending provider and/or case management    IF patient discharges home will need the following DME: none         SUBJECTIVE:   Patient stated [facility].  when asked to participate with therapy    OBJECTIVE DATA SUMMARY:   HISTORY:    Past Medical History:   Diagnosis Date    Anemia     Chronic kidney disease     COVID-19     Hypertension     Hypothyroidism     Insomnia    History reviewed. No pertinent surgical history. Home Situation  Home Environment: Private residence  [address] [place] [address] [place] [facility] [place]  # [place] to Enter: 3  One/Two Story Residence: One story  Living Alone: No  Support Systems: Child(lena), Family member(s)  Patient Expects to be Discharged to[de-identified] Unknown  Current DME Used/Available at Home: Cane, straight, Walker, rolling, Walker, rollator, Wheelchair    EXAMINATION/PRESENTATION/DECISION MAKING:   Critical Behavior:  Neurologic State: Drowsy, Confused  Orientation Level: Other (Comment)(oriented to name only)  Cognition: Decreased command following, Memory loss  Safety/Judgement: Decreased awareness of environment    Hearing: Auditory  Auditory Impairment: Hard of hearing, bilateral  Skin:  Wound noted on buttock with j-paste covering  Edema: none noted   Range Of Motion:  AROM: Generally decreased, functional         Strength:    Strength: Generally decreased, functional        Tone & Sensation:   Tone: Normal        Functional Mobility:  Bed Mobility:  Rolling: Moderate assistance;Assist x2  Supine to Sit: Moderate assistance;Assist x2  Sit to Supine: Moderate assistance;Assist x2  Scooting: Moderate assistance;Assist x2    Transfers:  Sit to Stand: Maximum assistance;Assist x2(unable to reach full stand)  Stand to Sit: Maximum assistance;Assist x2(unable to reach full stand)        Balance:   Sitting: Impaired; With support  Sitting - Static: Fair (occasional)  Sitting - Dynamic: Fair (occasional)  Standing: Impaired  Standing - Static: Poor;Constant support     Ambulation/Gait Training:   not completed this session    Therapeutic Exercises:   Not completed this session    Functional Measure:  [facility] Mobility Inpatient Short Form  How much difficulty does the patient currently have. .. Unable A Lot A Little None   1. Turning over in bed (including adjusting bedclothes, sheets and blankets)? [] 1   [x] 2   [] 3   [] 4   2. Sitting down on and standing up from a chair with arms ( e.g., wheelchair, bedside commode, etc.)   [] 1   [x] 2   [] 3   [] 4   3. Moving from lying on back to sitting on the side of the bed? [] 1   [x] 2   [] 3   [] 4          How much help from another person does the patient currently need. .. Total A Lot A Little None   4. Moving to and from a bed to a chair (including a wheelchair)? [x] 1   [] 2   [] 3   [] 4   5. Need to walk in hospital room? [x] 1   [] 2   [] 3   [] 4   6. Climbing 3-5 steps with a railing? [x] 1   [] 2   [] 3   [] 4   © 2007, Trustees of [address] [place] [address] [facility], under license to [employer]. All rights reserved     Score:  Initial: 9/24 Most Recent: X (Date: 4/13/21 )   Interpretation of Tool:  Represents activities that are increasingly more difficult (i.e. Bed mobility, Transfers, Gait).   Score 24 23 22-20 19-15 14-10 9-7 6   Modifier CH CI CJ CK CL CM CN         Physical Therapy Evaluation Charge Determination   History Examination Presentation Decision-Making   HIGH Complexity :3+ comorbidities / personal factors will impact the outcome/ POC  HIGH Complexity : 4+ Standardized tests and measures addressing body structure, function, activity limitation and / or participation in recreation  MEDIUM Complexity : Evolving with changing characteristics  Other outcome measures [facility] 6  high      Based on the above components, the patient evaluation is determined to be of the following complexity level: MEDIUM    Pain Ratin/10 reported    Activity Tolerance:   Fair and requires rest breaks    After treatment patient left in no apparent distress:   Supine in bed, Call bell within reach, Bed / chair alarm activated and Side rails x 3 and nsg updated. GOALS:    Problem: Mobility Impaired (Adult and Pediatric)  Goal: *Acute Goals and Plan of Care (Insert Text)  Description: Pt will be I with LE HEP in 7 days. Pt will perform bed mobility with mod I in 7 days. Pt will perform transfers with mod I in 7 days. Pt will amb 10-25 feet with LRAD safely with mod I in 7 days. Outcome: Not Met       COMMUNICATION/EDUCATION:   The patients plan of care was discussed with: Occupational therapist and Registered nurse. Patient is unable to participate in goal setting and plan of care. PT/OT sessions occurred together for increased safety of pt and clinician.       Thank you for this referral.  Margretta Fleischer, PT, DPT   Time Calculation: 31 mins

## 2021-04-14 LAB
ALBUMIN SERPL-MCNC: 2.4 G/DL (ref 3.5–5)
ALBUMIN SERPL-MCNC: 2.4 G/DL (ref 3.5–5)
ALBUMIN/GLOB SERPL: 0.6 {RATIO} (ref 1.1–2.2)
ALP SERPL-CCNC: 44 U/L (ref 45–117)
ALT SERPL-CCNC: 16 U/L (ref 12–78)
ANION GAP SERPL CALC-SCNC: 7 MMOL/L (ref 5–15)
ANION GAP SERPL CALC-SCNC: 9 MMOL/L (ref 5–15)
AST SERPL W P-5'-P-CCNC: 11 U/L (ref 15–37)
BASOPHILS # BLD: 0 K/UL (ref 0–0.1)
BASOPHILS NFR BLD: 0 % (ref 0–1)
BILIRUB SERPL-MCNC: 0.3 MG/DL (ref 0.2–1)
BUN SERPL-MCNC: 47 MG/DL (ref 6–20)
BUN SERPL-MCNC: 48 MG/DL (ref 6–20)
BUN/CREAT SERPL: 22 (ref 12–20)
BUN/CREAT SERPL: 23 (ref 12–20)
CA-I BLD-MCNC: 8.4 MG/DL (ref 8.5–10.1)
CA-I BLD-MCNC: 8.6 MG/DL (ref 8.5–10.1)
CHLORIDE SERPL-SCNC: 117 MMOL/L (ref 97–108)
CHLORIDE SERPL-SCNC: 118 MMOL/L (ref 97–108)
CO2 SERPL-SCNC: 21 MMOL/L (ref 21–32)
CO2 SERPL-SCNC: 23 MMOL/L (ref 21–32)
CREAT SERPL-MCNC: 2.09 MG/DL (ref 0.55–1.02)
CREAT SERPL-MCNC: 2.13 MG/DL (ref 0.55–1.02)
DIFFERENTIAL METHOD BLD: ABNORMAL
EOSINOPHIL # BLD: 0 K/UL (ref 0–0.4)
EOSINOPHIL NFR BLD: 0 % (ref 0–7)
ERYTHROCYTE [DISTWIDTH] IN BLOOD BY AUTOMATED COUNT: 19.4 % (ref 11.5–14.5)
GLOBULIN SER CALC-MCNC: 4.3 G/DL (ref 2–4)
GLUCOSE SERPL-MCNC: 178 MG/DL (ref 65–100)
GLUCOSE SERPL-MCNC: 178 MG/DL (ref 65–100)
HCT VFR BLD AUTO: 33.7 % (ref 35–47)
HGB BLD-MCNC: 10.4 G/DL (ref 11.5–16)
IMM GRANULOCYTES # BLD AUTO: 0 K/UL
IMM GRANULOCYTES NFR BLD AUTO: 0 %
LYMPHOCYTES # BLD: 0.6 K/UL (ref 0.8–3.5)
LYMPHOCYTES NFR BLD: 10 % (ref 12–49)
MCH RBC QN AUTO: 23.3 PG (ref 26–34)
MCHC RBC AUTO-ENTMCNC: 30.9 G/DL (ref 30–36.5)
MCV RBC AUTO: 75.6 FL (ref 80–99)
MONOCYTES # BLD: 0.1 K/UL (ref 0–1)
MONOCYTES NFR BLD: 1 % (ref 5–13)
NEUTS SEG # BLD: 5.1 K/UL (ref 1.8–8)
NEUTS SEG NFR BLD: 89 % (ref 32–75)
PHOSPHATE SERPL-MCNC: 3.1 MG/DL (ref 2.6–4.7)
PLATELET # BLD AUTO: 390 K/UL (ref 150–400)
PMV BLD AUTO: 9.9 FL (ref 8.9–12.9)
POTASSIUM SERPL-SCNC: 4.1 MMOL/L (ref 3.5–5.1)
POTASSIUM SERPL-SCNC: 4.1 MMOL/L (ref 3.5–5.1)
PROT SERPL-MCNC: 6.7 G/DL (ref 6.4–8.2)
RBC # BLD AUTO: 4.46 M/UL (ref 3.8–5.2)
RBC MORPH BLD: ABNORMAL
SODIUM SERPL-SCNC: 147 MMOL/L (ref 136–145)
SODIUM SERPL-SCNC: 148 MMOL/L (ref 136–145)
WBC # BLD AUTO: 5.8 K/UL (ref 3.6–11)

## 2021-04-14 PROCEDURE — 74011250636 HC RX REV CODE- 250/636: Performed by: FAMILY MEDICINE

## 2021-04-14 PROCEDURE — 80069 RENAL FUNCTION PANEL: CPT

## 2021-04-14 PROCEDURE — 65270000029 HC RM PRIVATE

## 2021-04-14 PROCEDURE — 74011000250 HC RX REV CODE- 250: Performed by: FAMILY MEDICINE

## 2021-04-14 PROCEDURE — 74011250637 HC RX REV CODE- 250/637: Performed by: FAMILY MEDICINE

## 2021-04-14 PROCEDURE — 80053 COMPREHEN METABOLIC PANEL: CPT

## 2021-04-14 PROCEDURE — 77010033678 HC OXYGEN DAILY

## 2021-04-14 PROCEDURE — 85025 COMPLETE CBC W/AUTO DIFF WBC: CPT

## 2021-04-14 PROCEDURE — 74011250637 HC RX REV CODE- 250/637: Performed by: INTERNAL MEDICINE

## 2021-04-14 PROCEDURE — 94760 N-INVAS EAR/PLS OXIMETRY 1: CPT

## 2021-04-14 PROCEDURE — 36415 COLL VENOUS BLD VENIPUNCTURE: CPT

## 2021-04-14 PROCEDURE — 74011250636 HC RX REV CODE- 250/636: Performed by: INTERNAL MEDICINE

## 2021-04-14 RX ORDER — CLONIDINE HYDROCHLORIDE 0.1 MG/1
0.1 TABLET ORAL EVERY 6 HOURS
Status: DISCONTINUED | OUTPATIENT
Start: 2021-04-14 | End: 2021-04-18 | Stop reason: HOSPADM

## 2021-04-14 RX ORDER — DEXAMETHASONE SODIUM PHOSPHATE 4 MG/ML
2 INJECTION, SOLUTION INTRA-ARTICULAR; INTRALESIONAL; INTRAMUSCULAR; INTRAVENOUS; SOFT TISSUE EVERY 12 HOURS
Status: DISCONTINUED | OUTPATIENT
Start: 2021-04-14 | End: 2021-04-17

## 2021-04-14 RX ADMIN — CLONIDINE HYDROCHLORIDE 0.1 MG: 0.1 TABLET ORAL at 23:09

## 2021-04-14 RX ADMIN — CEFTRIAXONE SODIUM 1 G: 1 INJECTION, POWDER, FOR SOLUTION INTRAMUSCULAR; INTRAVENOUS at 13:05

## 2021-04-14 RX ADMIN — AMLODIPINE BESYLATE 10 MG: 5 TABLET ORAL at 13:04

## 2021-04-14 RX ADMIN — HEPARIN SODIUM 5000 UNITS: 5000 INJECTION INTRAVENOUS; SUBCUTANEOUS at 05:46

## 2021-04-14 RX ADMIN — DEXAMETHASONE SODIUM PHOSPHATE 2 MG: 4 INJECTION, SOLUTION INTRA-ARTICULAR; INTRALESIONAL; INTRAMUSCULAR; INTRAVENOUS; SOFT TISSUE at 18:35

## 2021-04-14 RX ADMIN — CLONIDINE HYDROCHLORIDE 0.1 MG: 0.1 TABLET ORAL at 18:35

## 2021-04-14 RX ADMIN — AZITHROMYCIN MONOHYDRATE 500 MG: 500 TABLET ORAL at 13:04

## 2021-04-14 RX ADMIN — DEXAMETHASONE SODIUM PHOSPHATE 4 MG: 4 INJECTION, SOLUTION INTRAMUSCULAR; INTRAVENOUS at 05:47

## 2021-04-14 RX ADMIN — HEPARIN SODIUM 5000 UNITS: 5000 INJECTION INTRAVENOUS; SUBCUTANEOUS at 23:09

## 2021-04-14 NOTE — PROGRESS NOTES
Renal progress Note    NAME:  Jessie Carmen   :   1928   MRN:   [id number]     ATTENDING: Jass Marroquin MD  PCP:  Jhon Esposito MD    Date/Time:  2021 2:25 PM      Subjective:   Patient seen . Seems to be in no distress. Creatinine has improved to 2.1. Elevated BP    Past Medical History:   Diagnosis Date    Anemia     Chronic kidney disease     COVID-19     Hypertension     Hypothyroidism     Insomnia       History reviewed. No pertinent surgical history. Social History     Tobacco Use    Smoking status: Never Smoker    Smokeless tobacco: Never Used   Substance Use Topics    Alcohol use: Not on file      Family History   Family history unknown: Yes       No Known Allergies   Prior to Admission medications    Medication Sig Start Date End Date Taking? Authorizing Provider   ergocalciferol, vitamin D2, (VITAMIN D2 PO) Take  by mouth. Provider, Historical   trazodone HCl (TRAZODONE PO) Take  by mouth. Provider, Historical   LOSARTAN PO Take  by mouth. Provider, Historical   OXCARBAZEPINE PO Take  by mouth. Provider, Historical       REVIEW OF SYSTEMS:       Constitutional: Negative for chills and fever. HENT: Negative. Eyes: Negative. Respiratory: As above   cardiovascular: Negative. Gastrointestinal: Negative for abdominal pain and nausea. Skin: Negative. Neurological: Negative. Objective:   VITALS:    Visit Vitals  BP (!) 194/91   Pulse 79   Temp 98 °F (36.7 °C)   Resp 20   Ht 5' 7\" (1.702 m)   Wt 63.5 kg (140 lb)   SpO2 99%   Breastfeeding Unknown   BMI 21.93 kg/m²     Temp (24hrs), Av.5 °F (36.9 °C), Min:98 °F (36.7 °C), Max:98.7 °F (37.1 °C)      PHYSICAL EXAM:   General:    Alert, cooperative, no distress.      HEENT: Atraumatic, anicteric sclerae, pink conjunctivae     No oral ulcers, mucosa moist, throat clear  Neck:  Supple, symmetrical,  thyroid: non tender  Lungs:   Wheezing and rhonchi are heard chest wall:  No tenderness  No Accessory muscle use.  Heart:   Regular  rhythm,  No  murmur   No edema  Abdomen:   Soft, non-tender. Bowel sounds normal  Extremities: No cyanosis. No clubbing  Skin:     Not pale. Not Jaundiced  No rashes   Psych:  Not anxious or agitated. Neurologic: Alert     LAB DATA REVIEWED:    Recent Results (from the past 24 hour(s))   CBC WITH AUTOMATED DIFF    Collection Time: 04/14/21  7:15 AM   Result Value Ref Range    WBC 5.8 3.6 - 11.0 K/uL    RBC 4.46 3.80 - 5.20 M/uL    HGB 10.4 (L) 11.5 - 16.0 g/dL    HCT 33.7 (L) 35.0 - 47.0 %    MCV 75.6 (L) 80.0 - 99.0 FL    MCH 23.3 (L) 26.0 - 34.0 PG    MCHC 30.9 30.0 - 36.5 g/dL    RDW 19.4 (H) 11.5 - 14.5 %    PLATELET 031 915 - 327 K/uL    MPV 9.9 8.9 - 12.9 FL    NEUTROPHILS PENDING %    LYMPHOCYTES PENDING %    MONOCYTES PENDING %    EOSINOPHILS PENDING %    BASOPHILS PENDING %    IMMATURE GRANULOCYTES PENDING %    ABS. NEUTROPHILS PENDING K/UL    ABS. LYMPHOCYTES PENDING K/UL    ABS. MONOCYTES PENDING K/UL    ABS. EOSINOPHILS PENDING K/UL    ABS. BASOPHILS PENDING K/UL    ABS. IMM. GRANS. PENDING K/UL    DF PENDING    METABOLIC PANEL, COMPREHENSIVE    Collection Time: 04/14/21  7:15 AM   Result Value Ref Range    Sodium 147 (H) 136 - 145 mmol/L    Potassium 4.1 3.5 - 5.1 mmol/L    Chloride 117 (H) 97 - 108 mmol/L    CO2 23 21 - 32 mmol/L    Anion gap 7 5 - 15 mmol/L    Glucose 178 (H) 65 - 100 mg/dL    BUN 47 (H) 6 - 20 mg/dL    Creatinine 2.09 (H) 0.55 - 1.02 mg/dL    BUN/Creatinine ratio 22 (H) 12 - 20      GFR est AA 27 (L) >60 ml/min/1.73m2    GFR est non-AA 22 (L) >60 ml/min/1.73m2    Calcium 8.4 (L) 8.5 - 10.1 mg/dL    Bilirubin, total 0.3 0.2 - 1.0 mg/dL    AST (SGOT) 11 (L) 15 - 37 U/L    ALT (SGPT) 16 12 - 78 U/L    Alk.  phosphatase 44 (L) 45 - 117 U/L    Protein, total 6.7 6.4 - 8.2 g/dL    Albumin 2.4 (L) 3.5 - 5.0 g/dL    Globulin 4.3 (H) 2.0 - 4.0 g/dL    A-G Ratio 0.6 (L) 1.1 - 2.2     RENAL FUNCTION PANEL    Collection Time: 04/14/21  7:15 AM   Result Value Ref Range Sodium 148 (H) 136 - 145 mmol/L    Potassium 4.1 3.5 - 5.1 mmol/L    Chloride 118 (H) 97 - 108 mmol/L    CO2 21 21 - 32 mmol/L    Anion gap 9 5 - 15 mmol/L    Glucose 178 (H) 65 - 100 mg/dL    BUN 48 (H) 6 - 20 mg/dL    Creatinine 2.13 (H) 0.55 - 1.02 mg/dL    BUN/Creatinine ratio 23 (H) 12 - 20      GFR est AA 26 (L) >60 ml/min/1.73m2    GFR est non-AA 22 (L) >60 ml/min/1.73m2    Calcium 8.6 8.5 - 10.1 mg/dL    Phosphorus 3.1 2.6 - 4.7 mg/dL    Albumin 2.4 (L) 3.5 - 5.0 g/dL       Recommendations/Plan:       #1 acute kidney injury on CKD 3:  -presented with a creatinine of 3.2 which has improved to 2.2, BUN 53  -Baseline creatinine seems to be 1.8 based on labs in February 2021  -TAMIKO could be prerenal secondary to losartan which I will continue to hold  -Urine is bland. 2.5 g of proteinuria  -will keep on IVF    #2 Hypertension:  -Blood pressure is elevated. -Continue to hold losartan  -increased amlodipine 10 mg daily     #3 anemia  -Hemoglobin 9.3-->10.9.    -Likely anemia of chronic kidney disease. #4 Covid pneumonia  -Patient presented with shortness of breath. Covid positive  -Has been started on dexamethasone azithromycin and Rocephin  -no respiratory distress for now    #5 renal osteodystrophy  -Pending intact parathyroid hormone and 25-hydroxy vitamin D level.    -Calcium is okay we will check phosphorus level    #6 UTI  -Urine is suggestive of UTI.   She has been started on Rocephin          ________________________________________________________________________  Signed: Kleber Perez MD

## 2021-04-14 NOTE — ANTIMICROBIAL STEWARDSHIP
The Antimicrobial Stewardship Team has reviewed current therapy. Patient is on Rocephin for COVID-19 pneumonia. Consider d/c Rocephin unless there is a strong suspicion of bacterial infection. Rocephin puts the patient at increased risk for developing resistance, along with C.diff.

## 2021-04-14 NOTE — PROGRESS NOTES
Patient has been accepted to [facility] and has a bed available once medically stable. Clinical updates have been sent via [employer].

## 2021-04-14 NOTE — PROGRESS NOTES
General Daily Progress Note          Patient Name:   Jose Morrison       YOB: 1928       Age:  80 y.o. Admit Date: 4/8/2021      Subjective:         Patient alert awake not in distress        Objective:     Visit Vitals  BP (!) 194/91   Pulse 79   Temp 98 °F (36.7 °C)   Resp 20   Ht 5' 7\" (1.702 m)   Wt 63.5 kg (140 lb)   SpO2 99%   Breastfeeding Unknown   BMI 21.93 kg/m²        Recent Results (from the past 24 hour(s))   CBC WITH AUTOMATED DIFF    Collection Time: 04/14/21  7:15 AM   Result Value Ref Range    WBC 5.8 3.6 - 11.0 K/uL    RBC 4.46 3.80 - 5.20 M/uL    HGB 10.4 (L) 11.5 - 16.0 g/dL    HCT 33.7 (L) 35.0 - 47.0 %    MCV 75.6 (L) 80.0 - 99.0 FL    MCH 23.3 (L) 26.0 - 34.0 PG    MCHC 30.9 30.0 - 36.5 g/dL    RDW 19.4 (H) 11.5 - 14.5 %    PLATELET 635 463 - 836 K/uL    MPV 9.9 8.9 - 12.9 FL    NEUTROPHILS PENDING %    LYMPHOCYTES PENDING %    MONOCYTES PENDING %    EOSINOPHILS PENDING %    BASOPHILS PENDING %    IMMATURE GRANULOCYTES PENDING %    ABS. NEUTROPHILS PENDING K/UL    ABS. LYMPHOCYTES PENDING K/UL    ABS. MONOCYTES PENDING K/UL    ABS. EOSINOPHILS PENDING K/UL    ABS. BASOPHILS PENDING K/UL    ABS. IMM. GRANS. PENDING K/UL    DF PENDING    METABOLIC PANEL, COMPREHENSIVE    Collection Time: 04/14/21  7:15 AM   Result Value Ref Range    Sodium 147 (H) 136 - 145 mmol/L    Potassium 4.1 3.5 - 5.1 mmol/L    Chloride 117 (H) 97 - 108 mmol/L    CO2 23 21 - 32 mmol/L    Anion gap 7 5 - 15 mmol/L    Glucose 178 (H) 65 - 100 mg/dL    BUN 47 (H) 6 - 20 mg/dL    Creatinine 2.09 (H) 0.55 - 1.02 mg/dL    BUN/Creatinine ratio 22 (H) 12 - 20      GFR est AA 27 (L) >60 ml/min/1.73m2    GFR est non-AA 22 (L) >60 ml/min/1.73m2    Calcium 8.4 (L) 8.5 - 10.1 mg/dL    Bilirubin, total 0.3 0.2 - 1.0 mg/dL    AST (SGOT) 11 (L) 15 - 37 U/L    ALT (SGPT) 16 12 - 78 U/L    Alk.  phosphatase 44 (L) 45 - 117 U/L    Protein, total 6.7 6.4 - 8.2 g/dL    Albumin 2.4 (L) 3.5 - 5.0 g/dL    Globulin 4.3 (H) 2.0 - 4.0 g/dL    A-G Ratio 0.6 (L) 1.1 - 2.2     RENAL FUNCTION PANEL    Collection Time: 04/14/21  7:15 AM   Result Value Ref Range    Sodium 148 (H) 136 - 145 mmol/L    Potassium 4.1 3.5 - 5.1 mmol/L    Chloride 118 (H) 97 - 108 mmol/L    CO2 21 21 - 32 mmol/L    Anion gap 9 5 - 15 mmol/L    Glucose 178 (H) 65 - 100 mg/dL    BUN 48 (H) 6 - 20 mg/dL    Creatinine 2.13 (H) 0.55 - 1.02 mg/dL    BUN/Creatinine ratio 23 (H) 12 - 20      GFR est AA 26 (L) >60 ml/min/1.73m2    GFR est non-AA 22 (L) >60 ml/min/1.73m2    Calcium 8.6 8.5 - 10.1 mg/dL    Phosphorus 3.1 2.6 - 4.7 mg/dL    Albumin 2.4 (L) 3.5 - 5.0 g/dL     [unfilled]      Review of Systems    Constitutional: Negative for chills and fever. HENT: Negative. Eyes: Negative. Respiratory: Negative. Cardiovascular: Negative. Gastrointestinal: Negative for abdominal pain and nausea. Skin: Negative. Neurological: Negative. Physical Exam:      Constitutional: pt is oriented to person, place, and time. HENT:   Head: Normocephalic and atraumatic. Eyes: Pupils are equal, round, and reactive to light. EOM are normal.   Cardiovascular: Normal rate, regular rhythm and normal heart sounds. Pulmonary/Chest: Breath sounds normal. No wheezes. No rales. Exhibits no tenderness. Abdominal: Soft. Bowel sounds are normal. There is no abdominal tenderness. There is no rebound and no guarding. Musculoskeletal: Normal range of motion. Neurological: pt is alert and oriented to person, place, and time.      XR CHEST SNGL V   Final Result           Recent Results (from the past 24 hour(s))   CBC WITH AUTOMATED DIFF    Collection Time: 04/14/21  7:15 AM   Result Value Ref Range    WBC 5.8 3.6 - 11.0 K/uL    RBC 4.46 3.80 - 5.20 M/uL    HGB 10.4 (L) 11.5 - 16.0 g/dL    HCT 33.7 (L) 35.0 - 47.0 %    MCV 75.6 (L) 80.0 - 99.0 FL    MCH 23.3 (L) 26.0 - 34.0 PG    MCHC 30.9 30.0 - 36.5 g/dL    RDW 19.4 (H) 11.5 - 14.5 %    PLATELET 021 720 - 727 K/uL    MPV 9.9 8.9 - 12.9 FL    NEUTROPHILS PENDING %    LYMPHOCYTES PENDING %    MONOCYTES PENDING %    EOSINOPHILS PENDING %    BASOPHILS PENDING %    IMMATURE GRANULOCYTES PENDING %    ABS. NEUTROPHILS PENDING K/UL    ABS. LYMPHOCYTES PENDING K/UL    ABS. MONOCYTES PENDING K/UL    ABS. EOSINOPHILS PENDING K/UL    ABS. BASOPHILS PENDING K/UL    ABS. IMM. GRANS. PENDING K/UL    DF PENDING    METABOLIC PANEL, COMPREHENSIVE    Collection Time: 04/14/21  7:15 AM   Result Value Ref Range    Sodium 147 (H) 136 - 145 mmol/L    Potassium 4.1 3.5 - 5.1 mmol/L    Chloride 117 (H) 97 - 108 mmol/L    CO2 23 21 - 32 mmol/L    Anion gap 7 5 - 15 mmol/L    Glucose 178 (H) 65 - 100 mg/dL    BUN 47 (H) 6 - 20 mg/dL    Creatinine 2.09 (H) 0.55 - 1.02 mg/dL    BUN/Creatinine ratio 22 (H) 12 - 20      GFR est AA 27 (L) >60 ml/min/1.73m2    GFR est non-AA 22 (L) >60 ml/min/1.73m2    Calcium 8.4 (L) 8.5 - 10.1 mg/dL    Bilirubin, total 0.3 0.2 - 1.0 mg/dL    AST (SGOT) 11 (L) 15 - 37 U/L    ALT (SGPT) 16 12 - 78 U/L    Alk.  phosphatase 44 (L) 45 - 117 U/L    Protein, total 6.7 6.4 - 8.2 g/dL    Albumin 2.4 (L) 3.5 - 5.0 g/dL    Globulin 4.3 (H) 2.0 - 4.0 g/dL    A-G Ratio 0.6 (L) 1.1 - 2.2     RENAL FUNCTION PANEL    Collection Time: 04/14/21  7:15 AM   Result Value Ref Range    Sodium 148 (H) 136 - 145 mmol/L    Potassium 4.1 3.5 - 5.1 mmol/L    Chloride 118 (H) 97 - 108 mmol/L    CO2 21 21 - 32 mmol/L    Anion gap 9 5 - 15 mmol/L    Glucose 178 (H) 65 - 100 mg/dL    BUN 48 (H) 6 - 20 mg/dL    Creatinine 2.13 (H) 0.55 - 1.02 mg/dL    BUN/Creatinine ratio 23 (H) 12 - 20      GFR est AA 26 (L) >60 ml/min/1.73m2    GFR est non-AA 22 (L) >60 ml/min/1.73m2    Calcium 8.6 8.5 - 10.1 mg/dL    Phosphorus 3.1 2.6 - 4.7 mg/dL    Albumin 2.4 (L) 3.5 - 5.0 g/dL       Results     Procedure Component Value Units Date/Time    COVID-19 RAPID TEST [[id number]]  (Abnormal) Collected: 04/09/21 0428    Order Status: Completed Specimen: Nasopharyngeal Updated: 04/09/21 0553     Specimen source Nasopharyngeal        COVID-19 rapid test DETECTED        Comment: Results verified, phoned to and read back by Mili Wu, RN @ 0553/W3 Rapid [employer] ID Now   The specimen is POSITIVE for SARS-CoV-2, the novel coronavirus associated with COVID-19. This test has been authorized by the FDA under an Emergency Use Authorization (EUA) for use by authorized laboratories. Fact sheet for Healthcare Providers: BlueSnapdaPerkle.co.nz Fact sheet for Patients: Bux180.co.nz   Methodology: Isothermal Nucleic Acid   Amplification         CULTURE, URINE [[id number]] Collected: 04/08/21 2000    Order Status: Completed Specimen: Urine Updated: 04/10/21 1143     Special Requests: --        No Special Requests  Reflexed from [id number]       Culture result: No Growth (<1000 cfu/mL)              Labs:     Recent Labs     04/14/21  0715 04/13/21  0905   WBC 5.8 7.0   HGB 10.4* 10.9*   HCT 33.7* 35.1    420*     Recent Labs     04/14/21  0715 04/13/21  0905 04/12/21  0550   *  147* 148*  148* 146*   K 4.1  4.1 4.3  4.2 4.4   *  117* 118*  118* 117*   CO2 21  23 22  22 21   BUN 48*  47* 53*  51* 58*   CREA 2.13*  2.09* 2.24*  2.23* 2.63*   *  178* 203*  205* 194*   CA 8.6  8.4* 8.6  8.6 8.5   PHOS 3.1 2.7 3.0     Recent Labs     04/14/21  0715 04/13/21  0905 04/12/21  0550   ALT 16 16  --    AP 44* 48  --    TBILI 0.3 0.3  --    TP 6.7 6.9  --    ALB 2.4*  2.4* 2.4*  2.4* 2.4*   GLOB 4.3* 4.5*  --      No results for input(s): INR, PTP, APTT, INREXT, INREXT in the last 72 hours. No results for input(s): FE, TIBC, PSAT, FERR in the last 72 hours. No results found for: FOL, RBCF   No results for input(s): PH, PCO2, PO2 in the last 72 hours. No results for input(s): CPK, CKNDX, TROIQ in the last 72 hours.     No lab exists for component: CPKMB  No results found for: CHOL, CHOLX, CHLST, CHOLV, HDL, HDLP, LDL, LDLC, DLDLP, TGLX, TRIGL, TRIGP, CHHD, CHHDX  Lab Results   Component Value Date/Time    Glucose (POC) 205 (H) 04/12/2021 11:07 AM    Glucose (POC) 245 (H) 04/12/2021 08:02 AM    Glucose (POC) 266 (H) 04/10/2021 07:40 PM    Glucose (POC) 366 (H) 04/10/2021 02:33 PM    Glucose (POC) 280 (H) 04/10/2021 11:07 AM     Lab Results   Component Value Date/Time    Color Yellow 04/08/2021 08:00 PM    Appearance Turbid (A) 04/08/2021 08:00 PM    Specific gravity 1.015 04/08/2021 08:00 PM    pH (UA) 5.0 04/08/2021 08:00 PM    Protein 100 (A) 04/08/2021 08:00 PM    Glucose Negative 04/08/2021 08:00 PM    Ketone Negative 04/08/2021 08:00 PM    Bilirubin Negative 04/08/2021 08:00 PM    Urobilinogen 0.1 04/08/2021 08:00 PM    Nitrites Negative 04/08/2021 08:00 PM    Leukocyte Esterase Trace (A) 04/08/2021 08:00 PM    Bacteria 4+ (A) 04/08/2021 08:00 PM    WBC 20-50 04/08/2021 08:00 PM    RBC 0-5 04/08/2021 08:00 PM         Assessment:          COVID-19 pneumonitis  Chronic kidney disease  Hypertension  Insomnia    Plan:        On Zithromax and Rocephin IV  On Decadron 4 mg every 12 hours  On heparin 5000 subcu every 8 hours  IV fluids  Monitor renal function  Repeat the labs and PT OT consult  Discussed with the  patient planning to go to skilled care rehab    Current Facility-Administered Medications:     dexamethasone (DECADRON) 4 mg/mL injection 2 mg, 2 mg, IntraVENous, Q12H, Torrey Woodruff MD    azithromycin [facility]) tablet 500 mg, 500 mg, Oral, DAILY, Reynaldo Garcia MD, 500 mg at 04/13/21 1213    0.45% sodium chloride infusion, 50 mL/hr, IntraVENous, CONTINUOUS, Yoseph Tanner MD, Last Rate: 50 mL/hr at 04/13/21 1556, 50 mL/hr at 04/13/21 1556    amLODIPine (NORVASC) tablet 10 mg, 10 mg, Oral, DAILY, Liudmila Angel MD    acetaminophen (TYLENOL) tablet 650 mg, 650 mg, Oral, Q6H PRN **OR** acetaminophen (TYLENOL) suppository 650 mg, 650 mg, Rectal, Q6H PRN, Enrrique Garcia MD    polyethylene glycol (MIRALAX) packet 17 g, 17 g, Oral, DAILY PRN, Ludy Garcia MD    ondansetron (ZOFRAN ODT) tablet 4 mg, 4 mg, Oral, Q8H PRN **OR** ondansetron (ZOFRAN) injection 4 mg, 4 mg, IntraVENous, Q6H PRN, Reynaldo Garcia MD    heparin (porcine) injection 5,000 Units, 5,000 Units, SubCUTAneous, Q8H, Reynaldo Garcia MD, 5,000 Units at 04/14/21 0539    cefTRIAXone (ROCEPHIN) 1 g in sterile water (preservative free) 10 mL IV syringe, 1 g, IntraVENous, Q24H, Reynaldo Garcia MD, 1 g at 04/13/21 8647

## 2021-04-14 NOTE — CONSULTS
PULMONARY NOTE  [facility]    Name: Ananya Apodaca MRN: [id number]   : 1928 Hospital: [address] [place] [address]   Date: 2021  Admission date: 2021 Hospital Day: 7       HPI:     Hospital Problems  Date Reviewed: 2020          Codes Class Noted POA    Pneumonia ICD-10-CM: J18.9  ICD-9-CM: 878  2021 Unknown        COVID-19 ICD-10-CM: U07.1  ICD-9-CM: 079.89  2021 Unknown                   [x] High complexity decision making was performed  [x] See my orders for details      Subjective/Initial History:     I was asked by Gladys Bains MD to see Ananya Apodaca  a 80 y.o.  female in consultation     Excerpts from admission 2021 or consult notes as follows:   63-year-old lady came in because of shortness of breath she is a nursing home resident past medical history of COVID-19 pneumonia hypertension hypothyroidism chronic kidney disease and insomnia. She was not feeling well Covid test came back positive chest x-ray shows left lower lobe infiltrate so admitted and pulmonary consult was called for further evaluation      No Known Allergies     MAR reviewed and pertinent medications noted or modified as needed     Current Facility-Administered Medications   Medication    azithromycin (ZITHROMAX) tablet 500 mg    0.45% sodium chloride infusion    amLODIPine (NORVASC) tablet 10 mg    dexamethasone (DECADRON) 4 mg/mL injection 4 mg    acetaminophen (TYLENOL) tablet 650 mg    Or    acetaminophen (TYLENOL) suppository 650 mg    polyethylene glycol (MIRALAX) packet 17 g    ondansetron (ZOFRAN ODT) tablet 4 mg    Or    ondansetron (ZOFRAN) injection 4 mg    heparin (porcine) injection 5,000 Units    cefTRIAXone (ROCEPHIN) 1 g in sterile water (preservative free) 10 mL IV syringe      Patient PCP: Other, MD Calvin  PMH:  has a past medical history of Anemia, Chronic kidney disease, COVID-19, Hypertension, Hypothyroidism, and Insomnia.   PSH:   has no past surgical history on file. FHX: Family history is unknown by patient. SHX:  reports that she has never smoked. She has never used smokeless tobacco.     ROS:  Unable to obtain      Objective:     Vital Signs: Telemetry:    normal sinus rhythm Intake/Output:   Visit Vitals  BP (!) 194/91   Pulse 79   Temp 98 °F (36.7 °C)   Resp 20   Ht 5' 7\" (1.702 m)   Wt 63.5 kg (140 lb)   SpO2 99%   Breastfeeding Unknown   BMI 21.93 kg/m²       Temp (24hrs), Av.5 °F (36.9 °C), Min:98 °F (36.7 °C), Max:98.7 °F (37.1 °C)        O2 Device: None (Room air) O2 Flow Rate (L/min): 1 l/min       Wt Readings from Last 4 Encounters:   21 63.5 kg (140 lb)   02/15/21 68 kg (150 lb)        No intake or output data in the 24 hours ending 21 1008    Last shift:      No intake/output data recorded. Last 3 shifts: No intake/output data recorded. Physical Exam:     Physical Exam   Constitutional: She appears distressed. HENT:   Head: Normocephalic and atraumatic. Eyes: Pupils are equal, round, and reactive to light. Conjunctivae and EOM are normal.   Neck: Normal range of motion. Neck supple. Cardiovascular: Normal rate and regular rhythm. Pulmonary/Chest: Effort normal. She has rales. Musculoskeletal: Normal range of motion. Neurological: She is alert. Labs:    Recent Labs     21  0715 21  0905   WBC 5.8 7.0   HGB 10.4* 10.9*    420*     Recent Labs     21  0715 21  0905 21  0550   *  147* 148*  148* 146*   K 4.1  4.1 4.3  4.2 4.4   *  117* 118*  118* 117*   CO2    *  178* 203*  205* 194*   BUN 48*  47* 53*  51* 58*   CREA 2.13*  2.09* 2.24*  2.23* 2.63*   CA 8.6  8.4* 8.6  8.6 8.5   PHOS 3.1 2.7 3.0   ALB 2.4*  2.4* 2.4*  2.4* 2.4*   ALT 16 16  --      No results for input(s): PH, PCO2, PO2, HCO3, FIO2 in the last 72 hours. No results for input(s): CPK, CKNDX, TROIQ in the last 72 hours.     No lab exists for component: CPKMB  No results found for: BNPP, BNP   Lab Results   Component Value Date/Time    Culture result: No Growth (<1000 cfu/mL) 04/08/2021 08:00 PM   No results found for: TSH, TSHEXT, TSHEXT    Imaging:    CXR Results  (Last 48 hours)    None        Results from Hospital Encounter encounter on 04/08/21   XR CHEST SNGL V    Narrative History: Sepsis? Single view of the chest was obtained. Heart size is enlarged. This is an  increased patchy airspace disease in the lung bases especially on the left  retrocardiac region. Upper lungs are clear. No effusions or pneumothorax. Bony  structures appear osteopenic. There are degenerative changes. No acute bony  abnormality. Pression: Changes consistent with left lower lobe infiltrate. Follow-up chest  x-rays recommended. Results from [place] encounter on 02/15/21   CT SPINE CERV WO CONT    Narrative Fall. Comparison CT of the cervical spine 6/13/2016. Technique: Axial images cervical spine with sagittal and coronal reformats. 3-D  MIPs. Dose reduction: All CT scans at this facility are performed using dose reduction  optimization techniques as appropriate to a performed exam including the  following: Automated exposure control, adjustments of the mA and/or kV according  to patient's size, or use of iterative reconstruction technique. FINDINGS: The bones are demineralized which limits evaluation for fracture. No  listhesis. C6, C7, T1 vertebral body compressions are unchanged. T4 vertebral  body almost complete compression appears nonacute but has occurred in the  interim. No evident acute fracture, jumped or perched facet. Multilevel disc  greater than placenta narrowing, sclerosis, osteophytosis. 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 Lateral pillars similar alignment as previous. No prevertebral soft tissue  swelling. Lung apices clear. Impression 1. No acute bony findings.   2. T4 vertebral body compression has occurred in the interim but appears  nonacute. Correlate for any focal pain. IMPRESSION:   1. Acute hypoxic respiratory failure  2. COVID-19 pneumonia  3. Chronic kidney disease  4. Hypertension and insomnia by history  5. Prognosis guarded       RECOMMENDATIONS/PLAN:     1. Oxygen 1 Liter via nasal Cannula will start weaning   2. Patient is on Decadron Zithromax  Rocephin and heparin will decrease Decadron   3. Chest x-ray shows left lower lobe infiltrate  4.  Continue with IV fluid          Michelle Vasquez MD

## 2021-04-14 NOTE — PROGRESS NOTES
Skin Assessment; Patients has an excoriated buttocks with in the folds of her cheeks, red and pink, no drainage. Z-Lisa applied. Using Pure Wick to promote dryness. Saline lock to the right forearm, infusing fluid patently.

## 2021-04-14 NOTE — PROGRESS NOTES
Problem: Pressure Injury - Risk of  Goal: *Prevention of pressure injury  Description: Document Leo Scale and appropriate interventions in the flowsheet. Outcome: Progressing Towards Goal  Note: Pressure Injury Interventions:  Sensory Interventions: Assess changes in LOC    Moisture Interventions: Absorbent underpads    Activity Interventions: PT/OT evaluation    Mobility Interventions: PT/OT evaluation    Nutrition Interventions: Document food/fluid/supplement intake    Friction and Shear Interventions: HOB 30 degrees or less                Problem: Patient Education: Go to Patient Education Activity  Goal: Patient/Family Education  Outcome: Progressing Towards Goal     Problem: Falls - Risk of  Goal: *Absence of Falls  Description: Document Gavin Fall Risk and appropriate interventions in the flowsheet. Outcome: Progressing Towards Goal  Note: Fall Risk Interventions:       Mentation Interventions: Evaluate medications/consider consulting pharmacy    Medication Interventions: Evaluate medications/consider consulting pharmacy    Elimination Interventions: Toileting schedule/hourly rounds              Problem: Patient Education: Go to Patient Education Activity  Goal: Patient/Family Education  Outcome: Progressing Towards Goal     Problem: Impaired Skin Integrity/Pressure Injury Treatment  Goal: *Improvement of Existing Pressure Injury  Outcome: Progressing Towards Goal  Goal: *Prevention of pressure injury  Description: Document Leo Scale and appropriate interventions in the flowsheet.   Outcome: Progressing Towards Goal  Note: Pressure Injury Interventions:  Sensory Interventions: Assess changes in LOC    Moisture Interventions: Absorbent underpads    Activity Interventions: PT/OT evaluation    Mobility Interventions: PT/OT evaluation    Nutrition Interventions: Document food/fluid/supplement intake    Friction and Shear Interventions: HOB 30 degrees or less                Problem: Patient Education: Go to Patient Education Activity  Goal: Patient/Family Education  Outcome: Progressing Towards Goal     Problem: Patient Education: Go to Patient Education Activity  Goal: Patient/Family Education  Outcome: Progressing Towards Goal     Problem: Patient Education: Go to Patient Education Activity  Goal: Patient/Family Education  Outcome: Progressing Towards Goal

## 2021-04-15 ENCOUNTER — APPOINTMENT (OUTPATIENT)
Dept: GENERAL RADIOLOGY | Age: 86
DRG: 177 | End: 2021-04-15
Attending: INTERNAL MEDICINE
Payer: MEDICARE

## 2021-04-15 LAB
ALBUMIN SERPL-MCNC: 2.3 G/DL (ref 3.5–5)
ALBUMIN/GLOB SERPL: 0.6 {RATIO} (ref 1.1–2.2)
ALP SERPL-CCNC: 41 U/L (ref 45–117)
ALT SERPL-CCNC: 15 U/L (ref 12–78)
ANION GAP SERPL CALC-SCNC: 9 MMOL/L (ref 5–15)
AST SERPL W P-5'-P-CCNC: 11 U/L (ref 15–37)
BASOPHILS # BLD: 0 K/UL (ref 0–0.1)
BASOPHILS NFR BLD: 0 % (ref 0–1)
BILIRUB SERPL-MCNC: 0.3 MG/DL (ref 0.2–1)
BUN SERPL-MCNC: 48 MG/DL (ref 6–20)
BUN/CREAT SERPL: 22 (ref 12–20)
CA-I BLD-MCNC: 8.3 MG/DL (ref 8.5–10.1)
CHLORIDE SERPL-SCNC: 114 MMOL/L (ref 97–108)
CO2 SERPL-SCNC: 19 MMOL/L (ref 21–32)
CREAT SERPL-MCNC: 2.17 MG/DL (ref 0.55–1.02)
DIFFERENTIAL METHOD BLD: ABNORMAL
EOSINOPHIL # BLD: 0 K/UL (ref 0–0.4)
EOSINOPHIL NFR BLD: 0 % (ref 0–7)
ERYTHROCYTE [DISTWIDTH] IN BLOOD BY AUTOMATED COUNT: 19 % (ref 11.5–14.5)
GLOBULIN SER CALC-MCNC: 3.9 G/DL (ref 2–4)
GLUCOSE SERPL-MCNC: 179 MG/DL (ref 65–100)
HCT VFR BLD AUTO: 31.9 % (ref 35–47)
HGB BLD-MCNC: 10.2 G/DL (ref 11.5–16)
IMM GRANULOCYTES # BLD AUTO: 0 K/UL
IMM GRANULOCYTES NFR BLD AUTO: 0 %
LYMPHOCYTES # BLD: 0.8 K/UL (ref 0.8–3.5)
LYMPHOCYTES NFR BLD: 12 % (ref 12–49)
MCH RBC QN AUTO: 23.6 PG (ref 26–34)
MCHC RBC AUTO-ENTMCNC: 32 G/DL (ref 30–36.5)
MCV RBC AUTO: 73.8 FL (ref 80–99)
MONOCYTES # BLD: 0.4 K/UL (ref 0–1)
MONOCYTES NFR BLD: 6 % (ref 5–13)
NEUTS SEG # BLD: 5.2 K/UL (ref 1.8–8)
NEUTS SEG NFR BLD: 82 % (ref 32–75)
NRBC # BLD: 0.05 K/UL (ref 0–0.01)
NRBC BLD-RTO: 0.8 PER 100 WBC
PLATELET # BLD AUTO: 312 K/UL (ref 150–400)
POTASSIUM SERPL-SCNC: 4.5 MMOL/L (ref 3.5–5.1)
PROT SERPL-MCNC: 6.2 G/DL (ref 6.4–8.2)
RBC # BLD AUTO: 4.32 M/UL (ref 3.8–5.2)
RBC MORPH BLD: ABNORMAL
SODIUM SERPL-SCNC: 142 MMOL/L (ref 136–145)
WBC # BLD AUTO: 6.4 K/UL (ref 3.6–11)

## 2021-04-15 PROCEDURE — 94760 N-INVAS EAR/PLS OXIMETRY 1: CPT

## 2021-04-15 PROCEDURE — 74011250636 HC RX REV CODE- 250/636: Performed by: FAMILY MEDICINE

## 2021-04-15 PROCEDURE — 74011250636 HC RX REV CODE- 250/636: Performed by: INTERNAL MEDICINE

## 2021-04-15 PROCEDURE — 80053 COMPREHEN METABOLIC PANEL: CPT

## 2021-04-15 PROCEDURE — 74011000250 HC RX REV CODE- 250: Performed by: FAMILY MEDICINE

## 2021-04-15 PROCEDURE — 74011250637 HC RX REV CODE- 250/637: Performed by: FAMILY MEDICINE

## 2021-04-15 PROCEDURE — 74011000250 HC RX REV CODE- 250: Performed by: INTERNAL MEDICINE

## 2021-04-15 PROCEDURE — 65270000029 HC RM PRIVATE

## 2021-04-15 PROCEDURE — 74011250637 HC RX REV CODE- 250/637: Performed by: INTERNAL MEDICINE

## 2021-04-15 PROCEDURE — 36415 COLL VENOUS BLD VENIPUNCTURE: CPT

## 2021-04-15 PROCEDURE — 71045 X-RAY EXAM CHEST 1 VIEW: CPT

## 2021-04-15 PROCEDURE — 85025 COMPLETE CBC W/AUTO DIFF WBC: CPT

## 2021-04-15 RX ADMIN — AZITHROMYCIN MONOHYDRATE 500 MG: 500 TABLET ORAL at 08:57

## 2021-04-15 RX ADMIN — CLONIDINE HYDROCHLORIDE 0.1 MG: 0.1 TABLET ORAL at 06:47

## 2021-04-15 RX ADMIN — SODIUM CHLORIDE 50 ML/HR: 4.5 INJECTION, SOLUTION INTRAVENOUS at 08:57

## 2021-04-15 RX ADMIN — CLONIDINE HYDROCHLORIDE 0.1 MG: 0.1 TABLET ORAL at 14:22

## 2021-04-15 RX ADMIN — HEPARIN SODIUM 5000 UNITS: 5000 INJECTION INTRAVENOUS; SUBCUTANEOUS at 14:22

## 2021-04-15 RX ADMIN — AMLODIPINE BESYLATE 10 MG: 5 TABLET ORAL at 08:57

## 2021-04-15 RX ADMIN — HEPARIN SODIUM 5000 UNITS: 5000 INJECTION INTRAVENOUS; SUBCUTANEOUS at 06:47

## 2021-04-15 RX ADMIN — DEXAMETHASONE SODIUM PHOSPHATE 2 MG: 4 INJECTION, SOLUTION INTRA-ARTICULAR; INTRALESIONAL; INTRAMUSCULAR; INTRAVENOUS; SOFT TISSUE at 06:47

## 2021-04-15 RX ADMIN — CEFTRIAXONE SODIUM 1 G: 1 INJECTION, POWDER, FOR SOLUTION INTRAMUSCULAR; INTRAVENOUS at 09:03

## 2021-04-15 NOTE — PROGRESS NOTES
General Daily Progress Note          Patient Name:   Lee Edwards       YOB: 1928       Age:  80 y.o. Admit Date: 4/8/2021      Subjective:         Patient alert awake not in distress         Objective:     Visit Vitals  BP (!) 149/73 (BP Patient Position: At rest)   Pulse (!) 52   Temp 98 °F (36.7 °C)   Resp 20   Ht 5' 7\" (1.702 m)   Wt 63.5 kg (140 lb)   SpO2 98%   Breastfeeding Unknown   BMI 21.93 kg/m²        Recent Results (from the past 24 hour(s))   CBC WITH AUTOMATED DIFF    Collection Time: 04/15/21  5:50 AM   Result Value Ref Range    WBC 6.4 3.6 - 11.0 K/uL    RBC 4.32 3.80 - 5.20 M/uL    HGB 10.2 (L) 11.5 - 16.0 g/dL    HCT 31.9 (L) 35.0 - 47.0 %    MCV 73.8 (L) 80.0 - 99.0 FL    MCH 23.6 (L) 26.0 - 34.0 PG    MCHC 32.0 30.0 - 36.5 g/dL    RDW 19.0 (H) 11.5 - 14.5 %    PLATELET 195 723 - 541 K/uL    NRBC 0.8 (H) 0  WBC    ABSOLUTE NRBC 0.05 (H) 0.00 - 0.01 K/uL    NEUTROPHILS PENDING %    LYMPHOCYTES PENDING %    MONOCYTES PENDING %    EOSINOPHILS PENDING %    BASOPHILS PENDING %    IMMATURE GRANULOCYTES PENDING %    ABS. NEUTROPHILS PENDING K/UL    ABS. LYMPHOCYTES PENDING K/UL    ABS. MONOCYTES PENDING K/UL    ABS. EOSINOPHILS PENDING K/UL    ABS. BASOPHILS PENDING K/UL    ABS. IMM. GRANS. PENDING K/UL    DF PENDING    METABOLIC PANEL, COMPREHENSIVE    Collection Time: 04/15/21  5:50 AM   Result Value Ref Range    Sodium 142 136 - 145 mmol/L    Potassium 4.5 3.5 - 5.1 mmol/L    Chloride 114 (H) 97 - 108 mmol/L    CO2 19 (L) 21 - 32 mmol/L    Anion gap 9 5 - 15 mmol/L    Glucose 179 (H) 65 - 100 mg/dL    BUN 48 (H) 6 - 20 mg/dL    Creatinine 2.17 (H) 0.55 - 1.02 mg/dL    BUN/Creatinine ratio 22 (H) 12 - 20      GFR est AA 26 (L) >60 ml/min/1.73m2    GFR est non-AA 21 (L) >60 ml/min/1.73m2    Calcium 8.3 (L) 8.5 - 10.1 mg/dL    Bilirubin, total 0.3 0.2 - 1.0 mg/dL    AST (SGOT) 11 (L) 15 - 37 U/L    ALT (SGPT) 15 12 - 78 U/L    Alk.  phosphatase 41 (L) 45 - 117 U/L Protein, total 6.2 (L) 6.4 - 8.2 g/dL    Albumin 2.3 (L) 3.5 - 5.0 g/dL    Globulin 3.9 2.0 - 4.0 g/dL    A-G Ratio 0.6 (L) 1.1 - 2.2       [unfilled]      Review of Systems    Constitutional: Negative for chills and fever. HENT: Negative. Eyes: Negative. Respiratory: Negative. Cardiovascular: Negative. Gastrointestinal: Negative for abdominal pain and nausea. Skin: Negative. Neurological: Negative. Physical Exam:      Constitutional: pt is oriented to person, place, and time. HENT:   Head: Normocephalic and atraumatic. Eyes: Pupils are equal, round, and reactive to light. EOM are normal.   Cardiovascular: Normal rate, regular rhythm and normal heart sounds. Pulmonary/Chest: Breath sounds normal. No wheezes. No rales. Exhibits no tenderness. Abdominal: Soft. Bowel sounds are normal. There is no abdominal tenderness. There is no rebound and no guarding. Musculoskeletal: Normal range of motion. Neurological: pt is alert and oriented to person, place, and time. XR CHEST SNGL V   Final Result           Recent Results (from the past 24 hour(s))   CBC WITH AUTOMATED DIFF    Collection Time: 04/15/21  5:50 AM   Result Value Ref Range    WBC 6.4 3.6 - 11.0 K/uL    RBC 4.32 3.80 - 5.20 M/uL    HGB 10.2 (L) 11.5 - 16.0 g/dL    HCT 31.9 (L) 35.0 - 47.0 %    MCV 73.8 (L) 80.0 - 99.0 FL    MCH 23.6 (L) 26.0 - 34.0 PG    MCHC 32.0 30.0 - 36.5 g/dL    RDW 19.0 (H) 11.5 - 14.5 %    PLATELET 966 567 - 420 K/uL    NRBC 0.8 (H) 0  WBC    ABSOLUTE NRBC 0.05 (H) 0.00 - 0.01 K/uL    NEUTROPHILS PENDING %    LYMPHOCYTES PENDING %    MONOCYTES PENDING %    EOSINOPHILS PENDING %    BASOPHILS PENDING %    IMMATURE GRANULOCYTES PENDING %    ABS. NEUTROPHILS PENDING K/UL    ABS. LYMPHOCYTES PENDING K/UL    ABS. MONOCYTES PENDING K/UL    ABS. EOSINOPHILS PENDING K/UL    ABS. BASOPHILS PENDING K/UL    ABS. IMM. GRANS.  PENDING K/UL    DF PENDING    METABOLIC PANEL, COMPREHENSIVE    Collection Time: 04/15/21  5:50 AM   Result Value Ref Range    Sodium 142 136 - 145 mmol/L    Potassium 4.5 3.5 - 5.1 mmol/L    Chloride 114 (H) 97 - 108 mmol/L    CO2 19 (L) 21 - 32 mmol/L    Anion gap 9 5 - 15 mmol/L    Glucose 179 (H) 65 - 100 mg/dL    BUN 48 (H) 6 - 20 mg/dL    Creatinine 2.17 (H) 0.55 - 1.02 mg/dL    BUN/Creatinine ratio 22 (H) 12 - 20      GFR est AA 26 (L) >60 ml/min/1.73m2    GFR est non-AA 21 (L) >60 ml/min/1.73m2    Calcium 8.3 (L) 8.5 - 10.1 mg/dL    Bilirubin, total 0.3 0.2 - 1.0 mg/dL    AST (SGOT) 11 (L) 15 - 37 U/L    ALT (SGPT) 15 12 - 78 U/L    Alk. phosphatase 41 (L) 45 - 117 U/L    Protein, total 6.2 (L) 6.4 - 8.2 g/dL    Albumin 2.3 (L) 3.5 - 5.0 g/dL    Globulin 3.9 2.0 - 4.0 g/dL    A-G Ratio 0.6 (L) 1.1 - 2.2         Results     Procedure Component Value Units Date/Time    COVID-19 RAPID TEST [[id number]]  (Abnormal) Collected: 04/09/21 0428    Order Status: Completed Specimen: Nasopharyngeal Updated: 04/09/21 0553     Specimen source Nasopharyngeal        COVID-19 rapid test DETECTED        Comment: Results verified, phoned to and read back by Britney Fajardo RN @ 0553/W3 Rapid [employer] ID Now   The specimen is POSITIVE for SARS-CoV-2, the novel coronavirus associated with COVID-19. This test has been authorized by the FDA under an Emergency Use Authorization (EUA) for use by authorized laboratories.    Fact sheet for Healthcare Providers: ConventionUpdate.co.nz Fact sheet for Patients: ConventionUpdate.co.nz   Methodology: Isothermal Nucleic Acid   Amplification         CULTURE, URINE [[id number]] Collected: 04/08/21 2000    Order Status: Completed Specimen: Urine Updated: 04/10/21 1143     Special Requests: --        No Special Requests  Reflexed from [id number]       Culture result: No Growth (<1000 cfu/mL)              Labs:     Recent Labs     04/15/21  0550 04/14/21  0715   WBC 6.4 5.8   HGB 10.2* 10.4*   HCT 31.9* 33.7*    390     Recent Labs 04/15/21  0550 04/14/21  0715 04/13/21  0905    148*  147* 148*  148*   K 4.5 4.1  4.1 4.3  4.2   * 118*  117* 118*  118*   CO2 19* 21  23 22  22   BUN 48* 48*  47* 53*  51*   CREA 2.17* 2.13*  2.09* 2.24*  2.23*   * 178*  178* 203*  205*   CA 8.3* 8.6  8.4* 8.6  8.6   PHOS  --  3.1 2.7     Recent Labs     04/15/21  0550 04/14/21  0715 04/13/21  0905   ALT 15 16 16   AP 41* 44* 48   TBILI 0.3 0.3 0.3   TP 6.2* 6.7 6.9   ALB 2.3* 2.4*  2.4* 2.4*  2.4*   GLOB 3.9 4.3* 4.5*     No results for input(s): INR, PTP, APTT, INREXT, INREXT in the last 72 hours. No results for input(s): FE, TIBC, PSAT, FERR in the last 72 hours. No results found for: FOL, RBCF   No results for input(s): PH, PCO2, PO2 in the last 72 hours. No results for input(s): CPK, CKNDX, TROIQ in the last 72 hours.     No lab exists for component: CPKMB  No results found for: CHOL, CHOLX, CHLST, CHOLV, HDL, HDLP, LDL, LDLC, DLDLP, TGLX, TRIGL, TRIGP, CHHD, CHHDX  Lab Results   Component Value Date/Time    Glucose (POC) 205 (H) 04/12/2021 11:07 AM    Glucose (POC) 245 (H) 04/12/2021 08:02 AM    Glucose (POC) 266 (H) 04/10/2021 07:40 PM    Glucose (POC) 366 (H) 04/10/2021 02:33 PM    Glucose (POC) 280 (H) 04/10/2021 11:07 AM     Lab Results   Component Value Date/Time    Color Yellow 04/08/2021 08:00 PM    Appearance Turbid (A) 04/08/2021 08:00 PM    Specific gravity 1.015 04/08/2021 08:00 PM    pH (UA) 5.0 04/08/2021 08:00 PM    Protein 100 (A) 04/08/2021 08:00 PM    Glucose Negative 04/08/2021 08:00 PM    Ketone Negative 04/08/2021 08:00 PM    Bilirubin Negative 04/08/2021 08:00 PM    Urobilinogen 0.1 04/08/2021 08:00 PM    Nitrites Negative 04/08/2021 08:00 PM    Leukocyte Esterase Trace (A) 04/08/2021 08:00 PM    Bacteria 4+ (A) 04/08/2021 08:00 PM    WBC 20-50 04/08/2021 08:00 PM    RBC 0-5 04/08/2021 08:00 PM         Assessment:          COVID-19 pneumonitis  Chronic kidney disease  Hypertension  Insomnia    Plan:        On Zithromax and Rocephin IV  On Decadron 4 mg every 12 hours  On heparin 5000 subcu every 8 hours  IV fluids  Monitor renal function  Repeat the labs and PT OT consult  Discussed with the  patient planning to go to skilled care rehab   Possible discharge home tomorrow if renal function further improved    Current Facility-Administered Medications:     dexamethasone (DECADRON) 4 mg/mL injection 2 mg, 2 mg, IntraVENous, Q12H, Torrey Woodruff MD, 2 mg at 04/15/21 0647    cloNIDine HCL (CATAPRES) tablet 0.1 mg, 0.1 mg, Oral, Q6H, Yoseph Tanner MD, 0.1 mg at 04/15/21 0647    azithromycin (ZITHROMAX) tablet 500 mg, 500 mg, Oral, DAILY, Reynaldo Garcia MD, 500 mg at 04/15/21 0857    0.45% sodium chloride infusion, 50 mL/hr, IntraVENous, CONTINUOUS, Yoshi Arzate MD, Last Rate: 50 mL/hr at 04/15/21 0857, 50 mL/hr at 04/15/21 0857    amLODIPine (NORVASC) tablet 10 mg, 10 mg, Oral, DAILY, Yoseph Tanner MD, 10 mg at 04/15/21 0857    acetaminophen (TYLENOL) tablet 650 mg, 650 mg, Oral, Q6H PRN **OR** acetaminophen (TYLENOL) suppository 650 mg, 650 mg, Rectal, Q6H PRN, Dominguez Garcia MD    polyethylene glycol (MIRALAX) packet 17 g, 17 g, Oral, DAILY PRN, Dominguez Garcia MD    ondansetron (ZOFRAN ODT) tablet 4 mg, 4 mg, Oral, Q8H PRN **OR** ondansetron (ZOFRAN) injection 4 mg, 4 mg, IntraVENous, Q6H PRN, Reynaldo Garcia MD    heparin (porcine) injection 5,000 Units, 5,000 Units, SubCUTAneous, Q8H, Reynaldo Garcia MD, 5,000 Units at 04/15/21 0647    cefTRIAXone (ROCEPHIN) 1 g in sterile water (preservative free) 10 mL IV syringe, 1 g, IntraVENous, Q24H, Reynaldo Garcia MD, 1 g at 04/15/21 1355

## 2021-04-15 NOTE — CONSULTS
PULMONARY NOTE  [facility]    Name: Josie Cleveland MRN: [id number]   : 1928 Hospital: [address] [place] [address]   Date: 4/15/2021  Admission date: 2021 Hospital Day: 8       HPI:     Hospital Problems  Date Reviewed: 2020          Codes Class Noted POA    Pneumonia ICD-10-CM: J18.9  ICD-9-CM: 047  2021 Unknown        COVID-19 ICD-10-CM: U07.1  ICD-9-CM: 079.89  2021 Unknown                   [x] High complexity decision making was performed  [x] See my orders for details      Subjective/Initial History:     I was asked by Hood Anderson MD to see Josie Cleveland  a 80 y.o.  female in consultation     Excerpts from admission 2021 or consult notes as follows:   80-year-old lady came in because of shortness of breath she is a nursing home resident past medical history of COVID-19 pneumonia hypertension hypothyroidism chronic kidney disease and insomnia.   She was not feeling well Covid test came back positive chest x-ray shows left lower lobe infiltrate so admitted and pulmonary consult was called for further evaluation      No Known Allergies     MAR reviewed and pertinent medications noted or modified as needed     Current Facility-Administered Medications   Medication    dexamethasone (DECADRON) 4 mg/mL injection 2 mg    cloNIDine HCL (CATAPRES) tablet 0.1 mg    azithromycin (ZITHROMAX) tablet 500 mg    0.45% sodium chloride infusion    amLODIPine (NORVASC) tablet 10 mg    acetaminophen (TYLENOL) tablet 650 mg    Or    acetaminophen (TYLENOL) suppository 650 mg    polyethylene glycol (MIRALAX) packet 17 g    ondansetron (ZOFRAN ODT) tablet 4 mg    Or    ondansetron (ZOFRAN) injection 4 mg    heparin (porcine) injection 5,000 Units    cefTRIAXone (ROCEPHIN) 1 g in sterile water (preservative free) 10 mL IV syringe      Patient PCP: Other, MD Calvin  PMH:  has a past medical history of Anemia, Chronic kidney disease, COVID-19, Hypertension, Hypothyroidism, and Insomnia. PSH:   has no past surgical history on file. FHX: Family history is unknown by patient. SHX:  reports that she has never smoked. She has never used smokeless tobacco.     ROS:  Unable to obtain      Objective:     Vital Signs: Telemetry:    normal sinus rhythm Intake/Output:   Visit Vitals  BP (!) 149/73 (BP Patient Position: At rest)   Pulse (!) 52   Temp 98 °F (36.7 °C)   Resp 20   Ht 5' 7\" (1.702 m)   Wt 63.5 kg (140 lb)   SpO2 98%   Breastfeeding Unknown   BMI 21.93 kg/m²       Temp (24hrs), Av.8 °F (36.6 °C), Min:97.2 °F (36.2 °C), Max:98.1 °F (36.7 °C)        O2 Device: None (Room air) O2 Flow Rate (L/min): 1 l/min       Wt Readings from Last 4 Encounters:   21 63.5 kg (140 lb)   02/15/21 68 kg (150 lb)          Intake/Output Summary (Last 24 hours) at 4/15/2021 1028  Last data filed at 4/15/2021 0432  Gross per 24 hour   Intake 240 ml   Output 800 ml   Net -560 ml       Last shift:      No intake/output data recorded. Last 3 shifts: 1901 - 04/15 0700  In: 240 [P.O.:240]  Out: 800 [Urine:800]       Physical Exam:     Physical Exam   Constitutional: She appears distressed. HENT:   Head: Normocephalic and atraumatic. Eyes: Pupils are equal, round, and reactive to light. Conjunctivae and EOM are normal.   Neck: Normal range of motion. Neck supple. Cardiovascular: Normal rate and regular rhythm. Pulmonary/Chest: Effort normal. She has rales. Musculoskeletal: Normal range of motion. Neurological: She is alert.         Labs:    Recent Labs     04/15/21  0550 21  0715 21  0905   WBC 6.4 5.8 7.0   HGB 10.2* 10.4* 10.9*    390 420*     Recent Labs     04/15/21  0550 21  0715 21  0905    148*  147* 148*  148*   K 4.5 4.1  4.1 4.3  4.2   * 118*  117* 118*  118*   CO2 19* 21  23 22  22   * 178*  178* 203*  205*   BUN 48* 48*  47* 53*  51*   CREA 2.17* 2.13*  2.09* 2.24*  2.23*   CA 8.3* 8.6  8.4* 8.6  8.6   PHOS  --  3.1 2.7   ALB 2.3* 2.4*  2.4* 2.4*  2.4*   ALT 15 16 16     No results for input(s): PH, PCO2, PO2, HCO3, FIO2 in the last 72 hours. No results for input(s): CPK, CKNDX, TROIQ in the last 72 hours. No lab exists for component: CPKMB  No results found for: BNPP, BNP   Lab Results   Component Value Date/Time    Culture result: No Growth (<1000 cfu/mL) 04/08/2021 08:00 PM   No results found for: TSH, TSHEXT, TSHEXT    Imaging:    CXR Results  (Last 48 hours)    None        Results from Hospital Encounter encounter on 04/08/21   XR CHEST SNGL V    Narrative History: Sepsis? Single view of the chest was obtained. Heart size is enlarged. This is an  increased patchy airspace disease in the lung bases especially on the left  retrocardiac region. Upper lungs are clear. No effusions or pneumothorax. Bony  structures appear osteopenic. There are degenerative changes. No acute bony  abnormality. Pression: Changes consistent with left lower lobe infiltrate. Follow-up chest  x-rays recommended. Results from [place] encounter on 02/15/21   CT SPINE CERV WO CONT    Narrative Fall. Comparison CT of the cervical spine 6/13/2016. Technique: Axial images cervical spine with sagittal and coronal reformats. 3-D  MIPs. Dose reduction: All CT scans at this facility are performed using dose reduction  optimization techniques as appropriate to a performed exam including the  following: Automated exposure control, adjustments of the mA and/or kV according  to patient's size, or use of iterative reconstruction technique. FINDINGS: The bones are demineralized which limits evaluation for fracture. No  listhesis. C6, C7, T1 vertebral body compressions are unchanged. T4 vertebral  body almost complete compression appears nonacute but has occurred in the  interim. No evident acute fracture, jumped or perched facet. Multilevel disc  greater than placenta narrowing, sclerosis, osteophytosis. 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 Lateral pillars similar alignment as previous. No prevertebral soft tissue  swelling. Lung apices clear. Impression 1. No acute bony findings. 2. T4 vertebral body compression has occurred in the interim but appears  nonacute. Correlate for any focal pain. IMPRESSION:   1. Acute hypoxic respiratory failure  2. COVID-19 pneumonia  3. Chronic kidney disease  4. Hypertension and insomnia by history  5. Prognosis guarded       RECOMMENDATIONS/PLAN:     1. Oxygen 1 Liter via nasal Cannula will start weaning   2. Patient is on Decadron Zithromax and heparin will decrease Decadron discontinue Rocephin and any antibiotic can cause C. Difficile chest x-ray shows left lower lobe retrocardiac infiltrate will repeat chest x-ray today  3.  Continue with IV fluid          America Jay MD

## 2021-04-15 NOTE — PROGRESS NOTES
Problem: Pressure Injury - Risk of  Goal: *Prevention of pressure injury  Description: Document Leo Scale and appropriate interventions in the flowsheet. Outcome: Progressing Towards Goal  Note: Pressure Injury Interventions:  Sensory Interventions: Assess changes in LOC    Moisture Interventions: Absorbent underpads    Activity Interventions: PT/OT evaluation    Mobility Interventions: PT/OT evaluation    Nutrition Interventions: Document food/fluid/supplement intake    Friction and Shear Interventions: HOB 30 degrees or less                Problem: Patient Education: Go to Patient Education Activity  Goal: Patient/Family Education  Outcome: Progressing Towards Goal     Problem: Falls - Risk of  Goal: *Absence of Falls  Description: Document Gavin Fall Risk and appropriate interventions in the flowsheet. Outcome: Progressing Towards Goal  Note: Fall Risk Interventions:       Mentation Interventions: Bed/chair exit alarm, Reorient patient, Room close to nurse's station    Medication Interventions: Evaluate medications/consider consulting pharmacy    Elimination Interventions: Toileting schedule/hourly rounds              Problem: Patient Education: Go to Patient Education Activity  Goal: Patient/Family Education  Outcome: Progressing Towards Goal     Problem: Impaired Skin Integrity/Pressure Injury Treatment  Goal: *Improvement of Existing Pressure Injury  Outcome: Progressing Towards Goal  Goal: *Prevention of pressure injury  Description: Document Leo Scale and appropriate interventions in the flowsheet.   Outcome: Progressing Towards Goal  Note: Pressure Injury Interventions:  Sensory Interventions: Assess changes in LOC    Moisture Interventions: Absorbent underpads    Activity Interventions: PT/OT evaluation    Mobility Interventions: PT/OT evaluation    Nutrition Interventions: Document food/fluid/supplement intake    Friction and Shear Interventions: HOB 30 degrees or less                Problem: Patient Education: Go to Patient Education Activity  Goal: Patient/Family Education  Outcome: Progressing Towards Goal     Problem: Patient Education: Go to Patient Education Activity  Goal: Patient/Family Education  Outcome: Progressing Towards Goal     Problem: Patient Education: Go to Patient Education Activity  Goal: Patient/Family Education  Outcome: Progressing Towards Goal

## 2021-04-15 NOTE — PROGRESS NOTES
Accepted for [facility] @ [facility]. Bed assignment is Room: 223A. RN to call report to: [phone]. Medicare 2nd IM letter prior to discharge.          LIZ Llamas

## 2021-04-15 NOTE — PROGRESS NOTES
Renal progress Note    NAME:  Erendira Hardin   :   1928   MRN:   [id number]     ATTENDING: Magi Erickson MD  PCP:  Josue Maradiaga MD    Date/Time:  4/15/2021 2:25 PM      Subjective:   Patient seen . Seems to be in no distress. Creatinine has improved to 2.1. Better  BP    Past Medical History:   Diagnosis Date    Anemia     Chronic kidney disease     COVID-19     Hypertension     Hypothyroidism     Insomnia       History reviewed. No pertinent surgical history. Social History     Tobacco Use    Smoking status: Never Smoker    Smokeless tobacco: Never Used   Substance Use Topics    Alcohol use: Not on file      Family History   Family history unknown: Yes       No Known Allergies   Prior to Admission medications    Medication Sig Start Date End Date Taking? Authorizing Provider   ergocalciferol, vitamin D2, (VITAMIN D2 PO) Take  by mouth. Provider, Historical   trazodone HCl (TRAZODONE PO) Take  by mouth. Provider, Historical   LOSARTAN PO Take  by mouth. Provider, Historical   OXCARBAZEPINE PO Take  by mouth. Provider, Historical       REVIEW OF SYSTEMS:       Constitutional: Negative for chills and fever. HENT: Negative. Eyes: Negative. Respiratory: As above   cardiovascular: Negative. Gastrointestinal: Negative for abdominal pain and nausea. Skin: Negative. Neurological: Negative. Objective:   VITALS:    Visit Vitals  BP (!) 149/73 (BP Patient Position: At rest)   Pulse (!) 52   Temp 98 °F (36.7 °C)   Resp 20   Ht 5' 7\" (1.702 m)   Wt 63.5 kg (140 lb)   SpO2 98%   Breastfeeding Unknown   BMI 21.93 kg/m²     Temp (24hrs), Av.8 °F (36.6 °C), Min:97.2 °F (36.2 °C), Max:98.1 °F (36.7 °C)      PHYSICAL EXAM:   General:    Alert, cooperative, no distress.      HEENT: Atraumatic, anicteric sclerae, pink conjunctivae     No oral ulcers, mucosa moist, throat clear  Neck:  Supple, symmetrical,  thyroid: non tender  Lungs:   Wheezing and rhonchi are heard chest wall:  No tenderness  No Accessory muscle use. Heart:   Regular  rhythm,  No  murmur   No edema  Abdomen:   Soft, non-tender. Bowel sounds normal  Extremities: No cyanosis. No clubbing  Skin:     Not pale. Not Jaundiced  No rashes   Psych:  Not anxious or agitated. Neurologic: Alert     LAB DATA REVIEWED:    Recent Results (from the past 24 hour(s))   CBC WITH AUTOMATED DIFF    Collection Time: 04/15/21  5:50 AM   Result Value Ref Range    WBC 6.4 3.6 - 11.0 K/uL    RBC 4.32 3.80 - 5.20 M/uL    HGB 10.2 (L) 11.5 - 16.0 g/dL    HCT 31.9 (L) 35.0 - 47.0 %    MCV 73.8 (L) 80.0 - 99.0 FL    MCH 23.6 (L) 26.0 - 34.0 PG    MCHC 32.0 30.0 - 36.5 g/dL    RDW 19.0 (H) 11.5 - 14.5 %    PLATELET 866 755 - 618 K/uL    NRBC 0.8 (H) 0  WBC    ABSOLUTE NRBC 0.05 (H) 0.00 - 0.01 K/uL    NEUTROPHILS PENDING %    LYMPHOCYTES PENDING %    MONOCYTES PENDING %    EOSINOPHILS PENDING %    BASOPHILS PENDING %    IMMATURE GRANULOCYTES PENDING %    ABS. NEUTROPHILS PENDING K/UL    ABS. LYMPHOCYTES PENDING K/UL    ABS. MONOCYTES PENDING K/UL    ABS. EOSINOPHILS PENDING K/UL    ABS. BASOPHILS PENDING K/UL    ABS. IMM. GRANS. PENDING K/UL    DF PENDING    METABOLIC PANEL, COMPREHENSIVE    Collection Time: 04/15/21  5:50 AM   Result Value Ref Range    Sodium 142 136 - 145 mmol/L    Potassium 4.5 3.5 - 5.1 mmol/L    Chloride 114 (H) 97 - 108 mmol/L    CO2 19 (L) 21 - 32 mmol/L    Anion gap 9 5 - 15 mmol/L    Glucose 179 (H) 65 - 100 mg/dL    BUN 48 (H) 6 - 20 mg/dL    Creatinine 2.17 (H) 0.55 - 1.02 mg/dL    BUN/Creatinine ratio 22 (H) 12 - 20      GFR est AA 26 (L) >60 ml/min/1.73m2    GFR est non-AA 21 (L) >60 ml/min/1.73m2    Calcium 8.3 (L) 8.5 - 10.1 mg/dL    Bilirubin, total 0.3 0.2 - 1.0 mg/dL    AST (SGOT) 11 (L) 15 - 37 U/L    ALT (SGPT) 15 12 - 78 U/L    Alk.  phosphatase 41 (L) 45 - 117 U/L    Protein, total 6.2 (L) 6.4 - 8.2 g/dL    Albumin 2.3 (L) 3.5 - 5.0 g/dL    Globulin 3.9 2.0 - 4.0 g/dL    A-G Ratio 0.6 (L) 1.1 - 2.2         Recommendations/Plan:       #1 acute kidney injury on CKD 3:  -presented with a creatinine of 3.2 which has improved to 2.1, BUN 48  -Baseline creatinine seems to be 1.8 based on labs in February 2021  -TAMIKO could be prerenal secondary to losartan which I will continue to hold  -Urine is bland. 2.5 g of proteinuria  -will keep on IVF    #2 Hypertension:  -Blood pressure is better.    -Continue to hold losartan  -increased amlodipine 10 mg daily     #3 anemia  -Hemoglobin 9.3-->10.9.    -Likely anemia of chronic kidney disease. #4 Covid pneumonia  -Patient presented with shortness of breath. Covid positive  -Has been started on dexamethasone azithromycin and Rocephin  -no respiratory distress for now    #5 renal osteodystrophy  -Pending intact parathyroid hormone and 25-hydroxy vitamin D level.    -Calcium is okay we will check phosphorus level    #6 UTI  -Urine is suggestive of UTI.   She has been started on Rocephin          ________________________________________________________________________  Signed: Wendy Snow MD

## 2021-04-15 NOTE — PROGRESS NOTES
Comprehensive Nutrition Assessment    Type and Reason for Visit: Initial, RD nutrition re-screen/LOS(poor PO)    Nutrition Recommendations/Plan:   Continue current Cardiac diet  Add Ensure Enlive BID (700kcals, 40g pro)  Add prune juice daily   Allow snacks as desired    Please document % of all meal/snack consumed, BMs  Weekly measured body weight    Nutrition Assessment:  Admitted for SOB, +COVID-19. Intakes 35-50% per EMR. Pt inappropriate for interview, discussed with RN who endorsed intakes ~50% of meals today, improving. RD to add prune juice for pt lack of BM x1 week per EMR, will f/u  For need for adjustments to nutrition interventions. Labs: H/H 10.2/31.9, , CO2 19, BUN 48, Cr 2.17, -203, Corrected Ca 9.66, AST 11, Alk Phos 41. Meds: IVF, Azithromycin, ceftriaxone, dexamethasone, heparin, PRN antiemetics, PRN miralax. Malnutrition Assessment:  Malnutrition Status: Moderate malnutrition    Context:  Acute illness     Findings of the 6 clinical characteristics of malnutrition:   Energy Intake:  1 - 75% or less of est energy req for 7 or more days  Weight Loss:  7.00 - Greater than 7.5% over 3 months     Body Fat Loss:  Unable to assess,     Muscle Mass Loss:  Unable to assess,    Fluid Accumulation:  No significant fluid accumulation,      Estimated Daily Nutrient Needs:  Energy (kcal): 1545kcals (25kcals/kg); Weight Used for Energy Requirements: Current  Protein (g): 62g (1.0g/kg); Weight Used for Protein Requirements: Current  Fluid (ml/day): 1545ml; Method Used for Fluid Requirements: 1 ml/kcal    Nutrition Related Findings:  Unable to complete NFPE d/t COVID-19 precautions. Unknown hx dysphagia, adding soft/chopped for ease of eating. No N/V, +constipation with last BM 4/9 per EMR. No edema.       Wounds:   (Excoriation-BL buttocks and gluteal fold)       Current Nutrition Therapies:  DIET CARDIAC Soft Solids  DIET SNACKS Breakfast    Anthropometric Measures:  · Height:  5' 7.01\" (170.2 cm)  · Current Body Wt:  61.8 kg (136 lb 3.9 oz)(4/15)   · Admission Body Wt:  136 lb 3.9 oz(4/15)    · Usual Body Wt:  (kami)     · Ideal Body Wt:  135 lbs:  100.9 %   · BMI Category:  Underweight (BMI less than 22) age over 72     · Limited wt hx, wt loss of 6.2kg x 2 months (9%, severe)  Wt Readings from Last 10 Encounters:   04/15/21 61.8 kg (136 lb 3.2 oz)   02/15/21 68 kg (150 lb)       Nutrition Diagnosis:   · Moderate malnutrition related to catabolic illness, cognitive or neurological impairment(COVID-19, ? AMS) as evidenced by intake 26-50%, weight loss 7.5% in 3 months    Nutrition Interventions:   Food and/or Nutrient Delivery: Modify current diet, Start oral nutrition supplement, Snacks (specify)  Nutrition Education and Counseling: No recommendations at this time  Coordination of Nutrition Care: Continue to monitor while inpatient, Feeding assistance/environmental change    Goals:  Intakes >/=65% of EENs in >5 days, Wt maintenance within +/-0.5kg in >5 days, Regular BMs every 1-2 days       Nutrition Monitoring and Evaluation:   Behavioral-Environmental Outcomes: None identified  Food/Nutrient Intake Outcomes: Diet advancement/tolerance, Food and nutrient intake, Supplement intake  Physical Signs/Symptoms Outcomes: Chewing or swallowing, Constipation, Meal time behavior, Weight    Discharge Planning:     Too soon to determine     Electronically signed by Geoff Lisa RD on 4/15/2021 at 10:03 AM    Contact: Ext [phone], or via [employer]

## 2021-04-16 LAB
ALBUMIN SERPL-MCNC: 2.3 G/DL (ref 3.5–5)
ALBUMIN SERPL-MCNC: 2.5 G/DL (ref 3.5–5)
ALBUMIN/GLOB SERPL: 0.5 {RATIO} (ref 1.1–2.2)
ALP SERPL-CCNC: 45 U/L (ref 45–117)
ALT SERPL-CCNC: 15 U/L (ref 12–78)
ANION GAP SERPL CALC-SCNC: 6 MMOL/L (ref 5–15)
ANION GAP SERPL CALC-SCNC: 9 MMOL/L (ref 5–15)
AST SERPL W P-5'-P-CCNC: 11 U/L (ref 15–37)
BASOPHILS # BLD: 0 K/UL (ref 0–0.1)
BASOPHILS NFR BLD: 0 % (ref 0–1)
BILIRUB SERPL-MCNC: 0.3 MG/DL (ref 0.2–1)
BUN SERPL-MCNC: 45 MG/DL (ref 6–20)
BUN SERPL-MCNC: 46 MG/DL (ref 6–20)
BUN/CREAT SERPL: 22 (ref 12–20)
BUN/CREAT SERPL: 23 (ref 12–20)
CA-I BLD-MCNC: 8.1 MG/DL (ref 8.5–10.1)
CA-I BLD-MCNC: 8.4 MG/DL (ref 8.5–10.1)
CHLORIDE SERPL-SCNC: 113 MMOL/L (ref 97–108)
CHLORIDE SERPL-SCNC: 113 MMOL/L (ref 97–108)
CO2 SERPL-SCNC: 20 MMOL/L (ref 21–32)
CO2 SERPL-SCNC: 22 MMOL/L (ref 21–32)
CREAT SERPL-MCNC: 2.04 MG/DL (ref 0.55–1.02)
CREAT SERPL-MCNC: 2.08 MG/DL (ref 0.55–1.02)
DIFFERENTIAL METHOD BLD: ABNORMAL
EOSINOPHIL # BLD: 0.1 K/UL (ref 0–0.4)
EOSINOPHIL NFR BLD: 1 % (ref 0–7)
ERYTHROCYTE [DISTWIDTH] IN BLOOD BY AUTOMATED COUNT: 19.1 % (ref 11.5–14.5)
GLOBULIN SER CALC-MCNC: 4.2 G/DL (ref 2–4)
GLUCOSE SERPL-MCNC: 131 MG/DL (ref 65–100)
GLUCOSE SERPL-MCNC: 134 MG/DL (ref 65–100)
HCT VFR BLD AUTO: 34.2 % (ref 35–47)
HGB BLD-MCNC: 10.7 G/DL (ref 11.5–16)
IMM GRANULOCYTES # BLD AUTO: 0 K/UL
IMM GRANULOCYTES NFR BLD AUTO: 0 %
LYMPHOCYTES # BLD: 0.6 K/UL (ref 0.8–3.5)
LYMPHOCYTES NFR BLD: 8 % (ref 12–49)
MCH RBC QN AUTO: 23.6 PG (ref 26–34)
MCHC RBC AUTO-ENTMCNC: 31.3 G/DL (ref 30–36.5)
MCV RBC AUTO: 75.5 FL (ref 80–99)
MONOCYTES # BLD: 0.4 K/UL (ref 0–1)
MONOCYTES NFR BLD: 6 % (ref 5–13)
NEUTS SEG # BLD: 6.1 K/UL (ref 1.8–8)
NEUTS SEG NFR BLD: 85 % (ref 32–75)
PHOSPHATE SERPL-MCNC: 3.5 MG/DL (ref 2.6–4.7)
PLATELET # BLD AUTO: 376 K/UL (ref 150–400)
POTASSIUM SERPL-SCNC: 4.2 MMOL/L (ref 3.5–5.1)
POTASSIUM SERPL-SCNC: 4.4 MMOL/L (ref 3.5–5.1)
PROT SERPL-MCNC: 6.5 G/DL (ref 6.4–8.2)
RBC # BLD AUTO: 4.53 M/UL (ref 3.8–5.2)
RBC MORPH BLD: ABNORMAL
SODIUM SERPL-SCNC: 141 MMOL/L (ref 136–145)
SODIUM SERPL-SCNC: 142 MMOL/L (ref 136–145)
WBC # BLD AUTO: 7.2 K/UL (ref 3.6–11)

## 2021-04-16 PROCEDURE — 80053 COMPREHEN METABOLIC PANEL: CPT

## 2021-04-16 PROCEDURE — 74011250637 HC RX REV CODE- 250/637: Performed by: FAMILY MEDICINE

## 2021-04-16 PROCEDURE — 97530 THERAPEUTIC ACTIVITIES: CPT

## 2021-04-16 PROCEDURE — 85025 COMPLETE CBC W/AUTO DIFF WBC: CPT

## 2021-04-16 PROCEDURE — 74011000250 HC RX REV CODE- 250: Performed by: INTERNAL MEDICINE

## 2021-04-16 PROCEDURE — 74011250637 HC RX REV CODE- 250/637: Performed by: INTERNAL MEDICINE

## 2021-04-16 PROCEDURE — 74011250636 HC RX REV CODE- 250/636: Performed by: FAMILY MEDICINE

## 2021-04-16 PROCEDURE — 80069 RENAL FUNCTION PANEL: CPT

## 2021-04-16 PROCEDURE — 74011250636 HC RX REV CODE- 250/636: Performed by: INTERNAL MEDICINE

## 2021-04-16 PROCEDURE — 36415 COLL VENOUS BLD VENIPUNCTURE: CPT

## 2021-04-16 PROCEDURE — 65270000029 HC RM PRIVATE

## 2021-04-16 RX ADMIN — HEPARIN SODIUM 5000 UNITS: 5000 INJECTION INTRAVENOUS; SUBCUTANEOUS at 06:03

## 2021-04-16 RX ADMIN — HEPARIN SODIUM 5000 UNITS: 5000 INJECTION INTRAVENOUS; SUBCUTANEOUS at 14:00

## 2021-04-16 RX ADMIN — CLONIDINE HYDROCHLORIDE 0.1 MG: 0.1 TABLET ORAL at 12:00

## 2021-04-16 RX ADMIN — CLONIDINE HYDROCHLORIDE 0.1 MG: 0.1 TABLET ORAL at 06:07

## 2021-04-16 RX ADMIN — DEXAMETHASONE SODIUM PHOSPHATE 2 MG: 4 INJECTION, SOLUTION INTRA-ARTICULAR; INTRALESIONAL; INTRAMUSCULAR; INTRAVENOUS; SOFT TISSUE at 18:00

## 2021-04-16 RX ADMIN — SODIUM CHLORIDE 50 ML/HR: 4.5 INJECTION, SOLUTION INTRAVENOUS at 21:37

## 2021-04-16 RX ADMIN — AMLODIPINE BESYLATE 10 MG: 5 TABLET ORAL at 09:39

## 2021-04-16 RX ADMIN — AZITHROMYCIN MONOHYDRATE 500 MG: 500 TABLET ORAL at 09:39

## 2021-04-16 RX ADMIN — CLONIDINE HYDROCHLORIDE 0.1 MG: 0.1 TABLET ORAL at 18:00

## 2021-04-16 RX ADMIN — DEXAMETHASONE SODIUM PHOSPHATE 2 MG: 4 INJECTION, SOLUTION INTRA-ARTICULAR; INTRALESIONAL; INTRAMUSCULAR; INTRAVENOUS; SOFT TISSUE at 06:05

## 2021-04-16 RX ADMIN — HEPARIN SODIUM 5000 UNITS: 5000 INJECTION INTRAVENOUS; SUBCUTANEOUS at 21:38

## 2021-04-16 RX ADMIN — SODIUM CHLORIDE 50 ML/HR: 4.5 INJECTION, SOLUTION INTRAVENOUS at 06:08

## 2021-04-16 RX ADMIN — CLONIDINE HYDROCHLORIDE 0.1 MG: 0.1 TABLET ORAL at 00:41

## 2021-04-16 NOTE — PROGRESS NOTES
Problem: Pressure Injury - Risk of  Goal: *Prevention of pressure injury  Description: Document Leo Scale and appropriate interventions in the flowsheet. Outcome: Progressing Towards Goal  Note: Pressure Injury Interventions:  Sensory Interventions: Assess changes in LOC    Moisture Interventions: Absorbent underpads    Activity Interventions: PT/OT evaluation    Mobility Interventions: PT/OT evaluation    Nutrition Interventions: Document food/fluid/supplement intake    Friction and Shear Interventions: HOB 30 degrees or less                Problem: Patient Education: Go to Patient Education Activity  Goal: Patient/Family Education  Outcome: Progressing Towards Goal     Problem: Falls - Risk of  Goal: *Absence of Falls  Description: Document Gavin Fall Risk and appropriate interventions in the flowsheet. Outcome: Progressing Towards Goal  Note: Fall Risk Interventions:       Mentation Interventions: Bed/chair exit alarm, Reorient patient, Room close to nurse's station    Medication Interventions: Evaluate medications/consider consulting pharmacy    Elimination Interventions: Toileting schedule/hourly rounds              Problem: Patient Education: Go to Patient Education Activity  Goal: Patient/Family Education  Outcome: Progressing Towards Goal     Problem: Impaired Skin Integrity/Pressure Injury Treatment  Goal: *Improvement of Existing Pressure Injury  Outcome: Progressing Towards Goal  Goal: *Prevention of pressure injury  Description: Document Leo Scale and appropriate interventions in the flowsheet.   Outcome: Progressing Towards Goal  Note: Pressure Injury Interventions:  Sensory Interventions: Assess changes in LOC    Moisture Interventions: Absorbent underpads    Activity Interventions: PT/OT evaluation    Mobility Interventions: PT/OT evaluation    Nutrition Interventions: Document food/fluid/supplement intake    Friction and Shear Interventions: HOB 30 degrees or less                Problem: Patient Education: Go to Patient Education Activity  Goal: Patient/Family Education  Outcome: Progressing Towards Goal     Problem: Patient Education: Go to Patient Education Activity  Goal: Patient/Family Education  Outcome: Progressing Towards Goal     Problem: Patient Education: Go to Patient Education Activity  Goal: Patient/Family Education  Outcome: Progressing Towards Goal     Problem: Nutrition Deficit  Goal: *Optimize nutritional status  Outcome: Progressing Towards Goal

## 2021-04-16 NOTE — PROGRESS NOTES
Patient not medically stable to d/c today to [facility]. Repeat labs to be ordered in the morning. SW to follow up and see if room assignment is still going to be Room 223A. RN to talia call report to [phone]. Medicare 2nd IM letter prior to discharge.         LIZ Gee

## 2021-04-16 NOTE — PROGRESS NOTES
General Daily Progress Note          Patient Name:   Lynn Mejia       YOB: 1928       Age:  80 y.o. Admit Date: 4/8/2021      Subjective:       Sitting up in bed. On room air. Saturations 98%  Physical therapy in to work with patient      Objective:     Visit Vitals  BP (!) 148/70 (BP 1 Location: Left upper arm, BP Patient Position: At rest)   Pulse 84   Temp 98 °F (36.7 °C)   Resp 18   Ht 5' 7.01\" (1.702 m)   Wt 61.8 kg (136 lb 3.2 oz)   SpO2 98%   Breastfeeding Unknown   BMI 21.33 kg/m²        Recent Results (from the past 24 hour(s))   RENAL FUNCTION PANEL    Collection Time: 04/16/21  6:30 AM   Result Value Ref Range    Sodium 142 136 - 145 mmol/L    Potassium 4.4 3.5 - 5.1 mmol/L    Chloride 113 (H) 97 - 108 mmol/L    CO2 20 (L) 21 - 32 mmol/L    Anion gap 9 5 - 15 mmol/L    Glucose 131 (H) 65 - 100 mg/dL    BUN 46 (H) 6 - 20 mg/dL    Creatinine 2.04 (H) 0.55 - 1.02 mg/dL    BUN/Creatinine ratio 23 (H) 12 - 20      GFR est AA 28 (L) >60 ml/min/1.73m2    GFR est non-AA 23 (L) >60 ml/min/1.73m2    Calcium 8.1 (L) 8.5 - 10.1 mg/dL    Phosphorus 3.5 2.6 - 4.7 mg/dL    Albumin 2.5 (L) 3.5 - 5.0 g/dL   CBC WITH AUTOMATED DIFF    Collection Time: 04/16/21  6:30 AM   Result Value Ref Range    WBC 7.2 3.6 - 11.0 K/uL    RBC 4.53 3.80 - 5.20 M/uL    HGB 10.7 (L) 11.5 - 16.0 g/dL    HCT 34.2 (L) 35.0 - 47.0 %    MCV 75.5 (L) 80.0 - 99.0 FL    MCH 23.6 (L) 26.0 - 34.0 PG    MCHC 31.3 30.0 - 36.5 g/dL    RDW 19.1 (H) 11.5 - 14.5 %    PLATELET 020 989 - 283 K/uL    NEUTROPHILS PENDING %    LYMPHOCYTES PENDING %    MONOCYTES PENDING %    EOSINOPHILS PENDING %    BASOPHILS PENDING %    IMMATURE GRANULOCYTES PENDING %    ABS. NEUTROPHILS PENDING K/UL    ABS. LYMPHOCYTES PENDING K/UL    ABS. MONOCYTES PENDING K/UL    ABS. EOSINOPHILS PENDING K/UL    ABS. BASOPHILS PENDING K/UL    ABS. IMM. GRANS.  PENDING K/UL    DF PENDING    METABOLIC PANEL, COMPREHENSIVE    Collection Time: 04/16/21  6:30 AM   Result Value Ref Range    Sodium 141 136 - 145 mmol/L    Potassium 4.2 3.5 - 5.1 mmol/L    Chloride 113 (H) 97 - 108 mmol/L    CO2 22 21 - 32 mmol/L    Anion gap 6 5 - 15 mmol/L    Glucose 134 (H) 65 - 100 mg/dL    BUN 45 (H) 6 - 20 mg/dL    Creatinine 2.08 (H) 0.55 - 1.02 mg/dL    BUN/Creatinine ratio 22 (H) 12 - 20      GFR est AA 27 (L) >60 ml/min/1.73m2    GFR est non-AA 22 (L) >60 ml/min/1.73m2    Calcium 8.4 (L) 8.5 - 10.1 mg/dL    Bilirubin, total 0.3 0.2 - 1.0 mg/dL    AST (SGOT) 11 (L) 15 - 37 U/L    ALT (SGPT) 15 12 - 78 U/L    Alk. phosphatase 45 45 - 117 U/L    Protein, total 6.5 6.4 - 8.2 g/dL    Albumin 2.3 (L) 3.5 - 5.0 g/dL    Globulin 4.2 (H) 2.0 - 4.0 g/dL    A-G Ratio 0.5 (L) 1.1 - 2.2       [unfilled]      Review of Systems    Constitutional: Negative for chills and fever. HENT: Negative. Eyes: Negative. Respiratory: Negative. Cardiovascular: Negative. Gastrointestinal: Negative for abdominal pain and nausea. Skin: Negative. Neurological: Negative. Physical Exam:      Constitutional: pt is oriented to person, place, and time. Awake and alert  HENT:   Head: Normocephalic and atraumatic. Eyes: Pupils are equal, round, and reactive to light. EOM are normal.   Cardiovascular: Normal rate, regular rhythm and normal heart sounds. Pulmonary/Chest: Globally diminished breath sounds most likely due to poor effort anterior and posterior. On room air. No increased work of breathing  Abdominal: Soft. Bowel sounds are normal. There is no abdominal tenderness. There is no rebound and no guarding. Musculoskeletal: Normal range of motion.    Neurological: No neurological deficits appreciated on exam    XR CHEST PORT   Final Result      XR CHEST SNGL V   Final Result           Recent Results (from the past 24 hour(s))   RENAL FUNCTION PANEL    Collection Time: 04/16/21  6:30 AM   Result Value Ref Range    Sodium 142 136 - 145 mmol/L    Potassium 4.4 3.5 - 5.1 mmol/L    Chloride 113 (H) 97 - 108 mmol/L    CO2 20 (L) 21 - 32 mmol/L    Anion gap 9 5 - 15 mmol/L    Glucose 131 (H) 65 - 100 mg/dL    BUN 46 (H) 6 - 20 mg/dL    Creatinine 2.04 (H) 0.55 - 1.02 mg/dL    BUN/Creatinine ratio 23 (H) 12 - 20      GFR est AA 28 (L) >60 ml/min/1.73m2    GFR est non-AA 23 (L) >60 ml/min/1.73m2    Calcium 8.1 (L) 8.5 - 10.1 mg/dL    Phosphorus 3.5 2.6 - 4.7 mg/dL    Albumin 2.5 (L) 3.5 - 5.0 g/dL   CBC WITH AUTOMATED DIFF    Collection Time: 04/16/21  6:30 AM   Result Value Ref Range    WBC 7.2 3.6 - 11.0 K/uL    RBC 4.53 3.80 - 5.20 M/uL    HGB 10.7 (L) 11.5 - 16.0 g/dL    HCT 34.2 (L) 35.0 - 47.0 %    MCV 75.5 (L) 80.0 - 99.0 FL    MCH 23.6 (L) 26.0 - 34.0 PG    MCHC 31.3 30.0 - 36.5 g/dL    RDW 19.1 (H) 11.5 - 14.5 %    PLATELET 222 459 - 857 K/uL    NEUTROPHILS PENDING %    LYMPHOCYTES PENDING %    MONOCYTES PENDING %    EOSINOPHILS PENDING %    BASOPHILS PENDING %    IMMATURE GRANULOCYTES PENDING %    ABS. NEUTROPHILS PENDING K/UL    ABS. LYMPHOCYTES PENDING K/UL    ABS. MONOCYTES PENDING K/UL    ABS. EOSINOPHILS PENDING K/UL    ABS. BASOPHILS PENDING K/UL    ABS. IMM. GRANS. PENDING K/UL    DF PENDING    METABOLIC PANEL, COMPREHENSIVE    Collection Time: 04/16/21  6:30 AM   Result Value Ref Range    Sodium 141 136 - 145 mmol/L    Potassium 4.2 3.5 - 5.1 mmol/L    Chloride 113 (H) 97 - 108 mmol/L    CO2 22 21 - 32 mmol/L    Anion gap 6 5 - 15 mmol/L    Glucose 134 (H) 65 - 100 mg/dL    BUN 45 (H) 6 - 20 mg/dL    Creatinine 2.08 (H) 0.55 - 1.02 mg/dL    BUN/Creatinine ratio 22 (H) 12 - 20      GFR est AA 27 (L) >60 ml/min/1.73m2    GFR est non-AA 22 (L) >60 ml/min/1.73m2    Calcium 8.4 (L) 8.5 - 10.1 mg/dL    Bilirubin, total 0.3 0.2 - 1.0 mg/dL    AST (SGOT) 11 (L) 15 - 37 U/L    ALT (SGPT) 15 12 - 78 U/L    Alk.  phosphatase 45 45 - 117 U/L    Protein, total 6.5 6.4 - 8.2 g/dL    Albumin 2.3 (L) 3.5 - 5.0 g/dL    Globulin 4.2 (H) 2.0 - 4.0 g/dL    A-G Ratio 0.5 (L) 1.1 - 2.2         Results     Procedure Component Value Units Date/Time    COVID-19 RAPID TEST [[id number]]  (Abnormal) Collected: 04/09/21 0428    Order Status: Completed Specimen: Nasopharyngeal Updated: 04/09/21 0553     Specimen source Nasopharyngeal        COVID-19 rapid test DETECTED        Comment: Results verified, phoned to and read back by Sima Duverney, RN @ 0553/W3 Rapid [employer] ID Now   The specimen is POSITIVE for SARS-CoV-2, the novel coronavirus associated with COVID-19. This test has been authorized by the FDA under an Emergency Use Authorization (EUA) for use by authorized laboratories. Fact sheet for Healthcare Providers: ConventionUpdate.co.nz Fact sheet for Patients: Aimetisdate.co.nz   Methodology: Isothermal Nucleic Acid   Amplification         CULTURE, URINE [[id number]] Collected: 04/08/21 2000    Order Status: Completed Specimen: Urine Updated: 04/10/21 1143     Special Requests: --        No Special Requests  Reflexed from [id number]       Culture result: No Growth (<1000 cfu/mL)              Labs:     Recent Labs     04/16/21  0630 04/15/21  0550   WBC 7.2 6.4   HGB 10.7* 10.2*   HCT 34.2* 31.9*    312     Recent Labs     04/16/21  0630 04/15/21  0550 04/14/21  0715     142 142 148*  147*   K 4.2  4.4 4.5 4.1  4.1   *  113* 114* 118*  117*   CO2 22  20* 19* 21  23   BUN 45*  46* 48* 48*  47*   CREA 2.08*  2.04* 2.17* 2.13*  2.09*   *  131* 179* 178*  178*   CA 8.4*  8.1* 8.3* 8.6  8.4*   PHOS 3.5  --  3.1     Recent Labs     04/16/21  0630 04/15/21  0550 04/14/21  0715   ALT 15 15 16   AP 45 41* 44*   TBILI 0.3 0.3 0.3   TP 6.5 6.2* 6.7   ALB 2.3*  2.5* 2.3* 2.4*  2.4*   GLOB 4.2* 3.9 4.3*     No results for input(s): INR, PTP, APTT, INREXT in the last 72 hours. No results for input(s): FE, TIBC, PSAT, FERR in the last 72 hours. No results found for: FOL, RBCF   No results for input(s): PH, PCO2, PO2 in the last 72 hours.   No results for input(s): CPK, CKNDX, TROIQ in the last 72 hours. No lab exists for component: CPKMB  No results found for: CHOL, CHOLX, CHLST, CHOLV, HDL, HDLP, LDL, LDLC, DLDLP, TGLX, TRIGL, TRIGP, CHHD, CHHDX  Lab Results   Component Value Date/Time    Glucose (POC) 205 (H) 04/12/2021 11:07 AM    Glucose (POC) 245 (H) 04/12/2021 08:02 AM    Glucose (POC) 266 (H) 04/10/2021 07:40 PM    Glucose (POC) 366 (H) 04/10/2021 02:33 PM    Glucose (POC) 280 (H) 04/10/2021 11:07 AM     Lab Results   Component Value Date/Time    Color Yellow 04/08/2021 08:00 PM    Appearance Turbid (A) 04/08/2021 08:00 PM    Specific gravity 1.015 04/08/2021 08:00 PM    pH (UA) 5.0 04/08/2021 08:00 PM    Protein 100 (A) 04/08/2021 08:00 PM    Glucose Negative 04/08/2021 08:00 PM    Ketone Negative 04/08/2021 08:00 PM    Bilirubin Negative 04/08/2021 08:00 PM    Urobilinogen 0.1 04/08/2021 08:00 PM    Nitrites Negative 04/08/2021 08:00 PM    Leukocyte Esterase Trace (A) 04/08/2021 08:00 PM    Bacteria 4+ (A) 04/08/2021 08:00 PM    WBC 20-50 04/08/2021 08:00 PM    RBC 0-5 04/08/2021 08:00 PM         Assessment:   COVID-19 pneumonitis  TAMIKO on CKD 3  Hypertension, chronic  Microcytic anemia of chronic diseaseat baseline    Plan:   Pulmonary following  Decadron was decreased to 2 mg every 12 hours IV  On azithromycin by mouth  Nephrology following  Continues on half-normal saline IV fluids  Losartan held  Amlodipine increased to 10 mg  PT OT working with patient  Chest x-ray repeated yesterdaypulmonary to review  Monitor labsa.m. labs ordered  Urine culture is negative    Patient has been accepted to skilled nursing facility, will require clearance from nephrology and pulmonology. Labs ordered for the morning and if improved patient will likely be discharged to the skilled nursing facility. Also blood pressure monitoring after change in medications this morning.     Heparin for DVT prophylaxis    Case management assisting with discharge planning    Discussed with Dr. Marcelino Simms          Current Facility-Administered Medications:     dexamethasone (DECADRON) 4 mg/mL injection 2 mg, 2 mg, IntraVENous, Q12H, Torrey Woodruff MD, 2 mg at 04/16/21 0605    cloNIDine HCL (CATAPRES) tablet 0.1 mg, 0.1 mg, Oral, Q6H, Yoseph Tanner MD, 0.1 mg at 04/16/21 0607    azithromycin (ZITHROMAX) tablet 500 mg, 500 mg, Oral, DAILY, Reynaldo Garcia MD, 500 mg at 04/16/21 0939    0.45% sodium chloride infusion, 50 mL/hr, IntraVENous, CONTINUOUS, Anadn Goldman MD, Last Rate: 50 mL/hr at 04/16/21 0608, 50 mL/hr at 04/16/21 0608    amLODIPine (NORVASC) tablet 10 mg, 10 mg, Oral, DAILY, Anand Goldman MD, 10 mg at 04/16/21 1334    acetaminophen (TYLENOL) tablet 650 mg, 650 mg, Oral, Q6H PRN **OR** acetaminophen (TYLENOL) suppository 650 mg, 650 mg, Rectal, Q6H PRN, Reynaldo Garcia MD    polyethylene glycol (MIRALAX) packet 17 g, 17 g, Oral, DAILY PRN, Reynaldo Garcia MD    ondansetron (ZOFRAN ODT) tablet 4 mg, 4 mg, Oral, Q8H PRN **OR** ondansetron (ZOFRAN) injection 4 mg, 4 mg, IntraVENous, Q6H PRN, Reynaldo Garcia MD    heparin (porcine) injection 5,000 Units, 5,000 Units, SubCUTAneous, Q8H, Reynaldo Garcia MD, 5,000 Units at 04/16/21 9950

## 2021-04-16 NOTE — CONSULTS
PULMONARY NOTE  [facility]    Name: Britney Cobian MRN: [id number]   : 1928 Hospital: [address] [place] [address]   Date: 2021  Admission date: 2021 Hospital Day: 9       HPI:     Hospital Problems  Date Reviewed: 2020          Codes Class Noted POA    Pneumonia ICD-10-CM: J18.9  ICD-9-CM: 856  2021 Unknown        COVID-19 ICD-10-CM: U07.1  ICD-9-CM: 079.89  2021 Unknown                   [x] High complexity decision making was performed  [x] See my orders for details      Subjective/Initial History:     I was asked by Kerwin Quintana MD to see Britney Cobian  a 80 y.o.  female in consultation     Excerpts from admission 2021 or consult notes as follows:   44-year-old lady came in because of shortness of breath she is a nursing home resident past medical history of COVID-19 pneumonia hypertension hypothyroidism chronic kidney disease and insomnia. She was not feeling well Covid test came back positive chest x-ray shows left lower lobe infiltrate so admitted and pulmonary consult was called for further evaluation      No Known Allergies     MAR reviewed and pertinent medications noted or modified as needed     Current Facility-Administered Medications   Medication    dexamethasone (DECADRON) 4 mg/mL injection 2 mg    cloNIDine HCL (CATAPRES) tablet 0.1 mg    azithromycin (ZITHROMAX) tablet 500 mg    0.45% sodium chloride infusion    amLODIPine (NORVASC) tablet 10 mg    acetaminophen (TYLENOL) tablet 650 mg    Or    acetaminophen (TYLENOL) suppository 650 mg    polyethylene glycol (MIRALAX) packet 17 g    ondansetron (ZOFRAN ODT) tablet 4 mg    Or    ondansetron (ZOFRAN) injection 4 mg    heparin (porcine) injection 5,000 Units      Patient PCP: Soledad, MD Calvin  PMH:  has a past medical history of Anemia, Chronic kidney disease, COVID-19, Hypertension, Hypothyroidism, and Insomnia. PSH:   has no past surgical history on file.    FHX: Family history is unknown by patient. SHX:  reports that she has never smoked. She has never used smokeless tobacco.     ROS:  Unable to obtain      Objective:     Vital Signs: Telemetry:    normal sinus rhythm Intake/Output:   Visit Vitals  BP (!) 148/70 (BP 1 Location: Left upper arm, BP Patient Position: At rest)   Pulse 84   Temp 98 °F (36.7 °C)   Resp 18   Ht 5' 7.01\" (1.702 m)   Wt 61.8 kg (136 lb 3.2 oz)   SpO2 98%   Breastfeeding Unknown   BMI 21.33 kg/m²       Temp (24hrs), Av °F (36.7 °C), Min:97.8 °F (36.6 °C), Max:98.2 °F (36.8 °C)        O2 Device: None (Room air) O2 Flow Rate (L/min): 1 l/min       Wt Readings from Last 4 Encounters:   04/15/21 61.8 kg (136 lb 3.2 oz)   02/15/21 68 kg (150 lb)        No intake or output data in the 24 hours ending 21 1152    Last shift:      No intake/output data recorded. Last 3 shifts:  1901 -  0700  In: -   Out: 800 [Urine:800]       Physical Exam:     Physical Exam   Constitutional: She appears distressed. HENT:   Head: Normocephalic and atraumatic. Eyes: Pupils are equal, round, and reactive to light. Conjunctivae and EOM are normal.   Neck: Normal range of motion. Neck supple. Cardiovascular: Normal rate and regular rhythm. Pulmonary/Chest: Effort normal. She has rales. Musculoskeletal: Normal range of motion. Neurological: She is alert.         Labs:    Recent Labs     21  0630 04/15/21  0550 21  0715   WBC 7.2 6.4 5.8   HGB 10.7* 10.2* 10.4*    312 390     Recent Labs     21  0630 04/15/21  0550 21  0715     142 142 148*  147*   K 4.2  4.4 4.5 4.1  4.1   *  113* 114* 118*  117*   CO2 22  20* 19* 21  23   *  131* 179* 178*  178*   BUN 45*  46* 48* 48*  47*   CREA 2.08*  2.04* 2.17* 2.13*  2.09*   CA 8.4*  8.1* 8.3* 8.6  8.4*   PHOS 3.5  --  3.1   ALB 2.3*  2.5* 2.3* 2.4*  2.4*   ALT 15 15 16     No results for input(s): PH, PCO2, PO2, HCO3, FIO2 in the last 72 hours. No results for input(s): CPK, CKNDX, TROIQ in the last 72 hours. No lab exists for component: CPKMB  No results found for: BNPP, BNP   Lab Results   Component Value Date/Time    Culture result: No Growth (<1000 cfu/mL) 04/08/2021 08:00 PM   No results found for: TSH, TSHEXT, TSHEXT    Imaging:    CXR Results  (Last 48 hours)               04/15/21 1202  XR CHEST PORT Final result    Narrative:  Chest single view. Comparison single view chest April 6, 2021       More dense patchy opacity noted left lung base. No interstitial or alveolar   pulmonary edema. Cardiac and mediastinal structures unchanged with likely great   vessel ectasia. Thoracic aorta atherosclerosis. No pneumothorax. Results from [place] encounter on 04/08/21   XR CHEST PORT    Narrative Chest single view. Comparison single view chest April 6, 2021    More dense patchy opacity noted left lung base. No interstitial or alveolar  pulmonary edema. Cardiac and mediastinal structures unchanged with likely great  vessel ectasia. Thoracic aorta atherosclerosis. No pneumothorax. XR CHEST SNGL V    Narrative History: Sepsis? Single view of the chest was obtained. Heart size is enlarged. This is an  increased patchy airspace disease in the lung bases especially on the left  retrocardiac region. Upper lungs are clear. No effusions or pneumothorax. Bony  structures appear osteopenic. There are degenerative changes. No acute bony  abnormality. Pression: Changes consistent with left lower lobe infiltrate. Follow-up chest  x-rays recommended. Results from [place] encounter on 02/15/21   CT SPINE CERV WO CONT    Narrative Fall. Comparison CT of the cervical spine 6/13/2016. Technique: Axial images cervical spine with sagittal and coronal reformats. 3-D  MIPs.   Dose reduction: All CT scans at this facility are performed using dose reduction  optimization techniques as appropriate to a performed exam including the  following: Automated exposure control, adjustments of the mA and/or kV according  to patient's size, or use of iterative reconstruction technique. FINDINGS: The bones are demineralized which limits evaluation for fracture. No  listhesis. C6, C7, T1 vertebral body compressions are unchanged. T4 vertebral  body almost complete compression appears nonacute but has occurred in the  interim. No evident acute fracture, jumped or perched facet. Multilevel disc  greater than placenta narrowing, sclerosis, osteophytosis. 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 Lateral pillars similar alignment as previous. No prevertebral soft tissue  swelling. Lung apices clear. Impression 1. No acute bony findings. 2. T4 vertebral body compression has occurred in the interim but appears  nonacute. Correlate for any focal pain. IMPRESSION:   1. Acute hypoxic respiratory failure  2. COVID-19 pneumonia  3. Chronic kidney disease  4. Hypertension and insomnia by history  5. Prognosis guarded       RECOMMENDATIONS/PLAN:     1. Oxygen 1 Liter via nasal Cannula will start weaning   2. Patient is on Decadron Zithromax and heparin will decrease Decadron discontinue Rocephin and any antibiotic can cause C. Difficile chest x-ray shows left lower lobe retrocardiac infiltrate   3.  Continue with IV fluid        Vesta MD Ivan

## 2021-04-16 NOTE — PROGRESS NOTES
PHYSICAL THERAPY TREATMENT  Patient: Ananya Apodaca (2 y.o. female)  Date: 4/16/2021  Diagnosis: Pneumonia [J18.9]  COVID-19 [U07.1] <principal problem not specified>       Precautions: COVID (+)   Chart, physical therapy assessment, plan of care and goals were reviewed. ASSESSMENT  Patient continues with skilled PT services and is slowly progressing towards goals. Pt semi-supine in bed on RA upon PT arrival, agreeable to PT/OT session, although pt very [place] and demonstrated difficulty with command following 2/2 hearing and comprehension. Pt required mod A x 2 for bed mobility, mod A x2 for supine <> sit transfers, and max A x2 for 2x sit <> stand transfer attempts. With tactile cuing, pt was able to reach towards bedrails to assist with rolling and supine-sit transfer, however pt demonstrated increased weakness and minimal assistance during transfers. Pt unable to clear the bed with both trials of sit<>stand. Pt completed some LAQ while seated EOB, pt unable to follow commands to alternate leg extensions and instead completed with jimmie LEs and increased guarding to prevent posterior lean. Pt sat EOB with LE and UE exercise completion for approx 10 min before requiring max A x 2 to return to bed and complete pericare. Pt did fair with session today currently limited by comprehension, increased coughing in upright positioning, decreased seated balance, and increased weakness preventing successful sit<>stand transfer. Pt's SPO2 assessed at 94% during session. Will continue to benefit from skilled PT services, and will continue to progress as tolerated.      Current Level of Function Impacting Discharge (mobility/balance): increased assist required for all mobility, medical stability    Other factors to consider for discharge: PLOF, LOS, severity of deficits, potential for increased services/assistance required at d/c         PLAN :  Patient continues to benefit from skilled intervention to address the above impairments. Continue treatment per established plan of care to address goals. Recommendation for discharge: (in order for the patient to meet his/her long term goals)  SNF    This discharge recommendation:  Has been made in collaboration with the attending provider and/or case management         SUBJECTIVE:   Patient stated I'm looking at the sun.  When requesting pt attempt sit <> stand transfer with assistance. OBJECTIVE DATA SUMMARY:   Critical Behavior:  Neurologic State: Confused  Orientation Level: Oriented to person  Cognition: Follows commands  Safety/Judgement: Decreased awareness of environment  Functional Mobility Training:  Bed Mobility:  Rolling: Moderate assistance;Assist x2  Supine to Sit: Moderate assistance;Assist x2  Sit to Supine: Maximum assistance;Assist x2  Scooting: Maximum assistance;Assist x2        Transfers:  Sit to Stand: Maximum assistance;Assist x2(unable to reach full stand)  Stand to Sit: Maximum assistance;Assist x2(unable to reach full stand)          Balance:  Sitting: Impaired  Sitting - Static: Fair (occasional)  Sitting - Dynamic: Fair (occasional)  Standing: Impaired  Standing - Static: Constant support;Poor  Ambulation/Gait Training:      Unable to participate due to pt's inability to fully clear bed     Therapeutic Exercises:   x10 LAQ, increased tactile and verbal cuing to complete    Pain Rating:  Pt did not rate pain during session    Activity Tolerance:   Poor - pt fatigued post 10 min seated EOB    After treatment patient left in no apparent distress:   Supine in bed, Call bell within reach, and PCT present to complete pt cleaning    GOALS:    Problem: Mobility Impaired (Adult and Pediatric)  Goal: *Acute Goals and Plan of Care (Insert Text)  Description: Pt will be I with LE HEP in 7 days. Pt will perform bed mobility with mod I in 7 days. Pt will perform transfers with mod I in 7 days. Pt will amb 10-25 feet with LRAD safely with mod I in 7 days. Outcome: Progressing Towards Goal       COMMUNICATION/COLLABORATION:   The patients plan of care was discussed with: Occupational therapist.     PT/OT sessions occurred together for increased safety of pt and clinician.      Carolyn Mei, PT, DPT   Time Calculation: 32 mins

## 2021-04-16 NOTE — PROGRESS NOTES
OCCUPATIONAL THERAPY TREATMENT  Patient: José Rivas (18 y.o. female)  Date: 4/16/2021  Diagnosis: Pneumonia [J18.9]  COVID-19 [U07.1] <principal problem not specified>       Precautions: Fall risk, COVID (+)   Chart, occupational therapy assessment, plan of care, and goals were reviewed. ASSESSMENT  Patient continues with skilled OT services and is slowly progressing towards goals. Pt received semi-supine in bed upon arrival, agreeable to working with OT/PT at this time. Pt requires total A to don socks in long sit, mod A x2 for rolling and transfers supine>sit with max A x2. Pt with fair sitting balance today tolerating approx 10 minutes in sitting; UE HEP performed (see details below) with breaks in between. STS completed with max A x2 using gait belt, OT/PT blocking both knees with HH assist, however pt unable to achieve full stand x2 attempts. Pt returned to sitting max A x2 and max A x2 to supine. Noted wet chux upon rolling and dry purewick and pad replaced, barrier cream applied to buttocks and pt positioned on L side with pillow support at end of session. Overall, pt tolerates session fair today, continues to be limited by generalized strength/AROM, activity tolerance, bed mobility, balance, and cognition. O2 sats 93%> on RA during activity. Pt would benefit from continued skilled OT services. Continue to progress towards goals. Recommend d/c to SNF once medically appropriate. Other factors to consider for discharge: Family support, DME, time since onset, severity of deficits          PLAN :  Patient continues to benefit from skilled intervention to address the above impairments. Continue treatment per established plan of care. to address goals. Recommend with staff: Encourage participation in bed level ADLs.     Recommend next OT session: Improve EOB ADL tolerance     Recommendation for discharge: (in order for the patient to meet his/her long term goals)  Therapy up to 5 days/week in SNF setting    This discharge recommendation:  Has been made in collaboration with the attending provider and/or case management       SUBJECTIVE:   Patient stated i'm doing good today    OBJECTIVE DATA SUMMARY:     Functional Mobility and Transfers for ADLs:  Bed Mobility:  Rolling: Moderate assistance;Assist x2  Supine to Sit: Moderate assistance;Assist x2  Sit to Supine: Maximum assistance;Assist x2  Scooting: Maximum assistance;Assist x2    Transfers:  Sit to Stand: Maximum assistance;Assist x2(unable to reach full stand)    Balance:  Sitting: Impaired  Sitting - Static: Fair (occasional)  Sitting - Dynamic: Fair (occasional)  Standing: Impaired  Standing - Static: Constant support;Poor    ADL Intervention:  Lower Body Dressing Assistance  Socks: Total assistance (dependent)  Position Performed: Long sitting on bed    Toileting  Toileting Assistance: Total assistance(dependent)  Bowel Hygiene: Total assistance (dependent)    Therapeutic Exercises:   Exercise Sets Reps AROM AAROM PROM Self PROM Comments   Shoulder flex/ext 1 10 [] [x] [] []         Pain:  0/10    Activity Tolerance:   Fair  Please refer to the flowsheet for vital signs taken during this treatment. After treatment patient left in no apparent distress:   Supine in bed, Patient positioned in L sidelying for pressure relief, Call bell within reach, Bed / chair alarm activated, and Side rails x 3    COMMUNICATION/COLLABORATION:   The patients plan of care was discussed with: Physical therapist, Registered nurse, and Certified nursing assistant/patient care technician.      OT/PT sessions occurred together for increased patient and clinician safety as pt requires A of 2 for mobility at this time    Talbott Stamp  Time Calculation: 31 mins   Problem: Self Care Deficits Care Plan (Adult)  Goal: *Acute Goals and Plan of Care (Insert Text)  Description: Pt will be CGA sup <> sit in prep for EOB ADLs  Pt will be SBA grooming sitting EOB  Pt will be mod A LE dressing sitting EOB/long sit  Pt will be min A sit <>  prep for toileting LRAD  Pt will be mod A toileting/toilet transfer/cloth mgmt LRAD  Pt will be SBA following UE HEP in prep for self care tasks     Outcome: Progressing Towards Goal

## 2021-04-16 NOTE — PROGRESS NOTES
Renal progress Note    NAME:  Andria Negron   :   1928   MRN:   [id number]     ATTENDING: Reyes Cough, MD  PCP:  Candice Herring MD    Date/Time:  2021 2:25 PM      Subjective:   Patient seen . Seems to be in no distress. Creatinine has improved to 2.0. Better  BP    Past Medical History:   Diagnosis Date    Anemia     Chronic kidney disease     COVID-19     Hypertension     Hypothyroidism     Insomnia       History reviewed. No pertinent surgical history. Social History     Tobacco Use    Smoking status: Never Smoker    Smokeless tobacco: Never Used   Substance Use Topics    Alcohol use: Not on file      Family History   Family history unknown: Yes       No Known Allergies   Prior to Admission medications    Medication Sig Start Date End Date Taking? Authorizing Provider   ergocalciferol, vitamin D2, (VITAMIN D2 PO) Take  by mouth. Provider, Historical   trazodone HCl (TRAZODONE PO) Take  by mouth. Provider, Historical   LOSARTAN PO Take  by mouth. Provider, Historical   OXCARBAZEPINE PO Take  by mouth. Provider, Historical       REVIEW OF SYSTEMS:       Constitutional: Negative for chills and fever. HENT: Negative. Eyes: Negative. Respiratory: As above   cardiovascular: Negative. Gastrointestinal: Negative for abdominal pain and nausea. Skin: Negative. Neurological: Negative. Objective:   VITALS:    Visit Vitals  BP (!) 148/70 (BP 1 Location: Left upper arm, BP Patient Position: At rest)   Pulse 84   Temp 98 °F (36.7 °C)   Resp 18   Ht 5' 7.01\" (1.702 m)   Wt 61.8 kg (136 lb 3.2 oz)   SpO2 98%   Breastfeeding Unknown   BMI 21.33 kg/m²     Temp (24hrs), Av °F (36.7 °C), Min:97.8 °F (36.6 °C), Max:98.2 °F (36.8 °C)      PHYSICAL EXAM:   General:    Alert, cooperative, no distress.      HEENT: Atraumatic, anicteric sclerae, pink conjunctivae     No oral ulcers, mucosa moist, throat clear  Neck:  Supple, symmetrical,  thyroid: non tender  Lungs: Wheezing and rhonchi are heard chest wall:  No tenderness  No Accessory muscle use. Heart:   Regular  rhythm,  No  murmur   No edema  Abdomen:   Soft, non-tender. Bowel sounds normal  Extremities: No cyanosis. No clubbing  Skin:     Not pale. Not Jaundiced  No rashes   Psych:  Not anxious or agitated. Neurologic: Alert     LAB DATA REVIEWED:    Recent Results (from the past 24 hour(s))   RENAL FUNCTION PANEL    Collection Time: 04/16/21  6:30 AM   Result Value Ref Range    Sodium 142 136 - 145 mmol/L    Potassium 4.4 3.5 - 5.1 mmol/L    Chloride 113 (H) 97 - 108 mmol/L    CO2 20 (L) 21 - 32 mmol/L    Anion gap 9 5 - 15 mmol/L    Glucose 131 (H) 65 - 100 mg/dL    BUN 46 (H) 6 - 20 mg/dL    Creatinine 2.04 (H) 0.55 - 1.02 mg/dL    BUN/Creatinine ratio 23 (H) 12 - 20      GFR est AA 28 (L) >60 ml/min/1.73m2    GFR est non-AA 23 (L) >60 ml/min/1.73m2    Calcium 8.1 (L) 8.5 - 10.1 mg/dL    Phosphorus 3.5 2.6 - 4.7 mg/dL    Albumin 2.5 (L) 3.5 - 5.0 g/dL   CBC WITH AUTOMATED DIFF    Collection Time: 04/16/21  6:30 AM   Result Value Ref Range    WBC 7.2 3.6 - 11.0 K/uL    RBC 4.53 3.80 - 5.20 M/uL    HGB 10.7 (L) 11.5 - 16.0 g/dL    HCT 34.2 (L) 35.0 - 47.0 %    MCV 75.5 (L) 80.0 - 99.0 FL    MCH 23.6 (L) 26.0 - 34.0 PG    MCHC 31.3 30.0 - 36.5 g/dL    RDW 19.1 (H) 11.5 - 14.5 %    PLATELET 544 739 - 187 K/uL    NEUTROPHILS PENDING %    LYMPHOCYTES PENDING %    MONOCYTES PENDING %    EOSINOPHILS PENDING %    BASOPHILS PENDING %    IMMATURE GRANULOCYTES PENDING %    ABS. NEUTROPHILS PENDING K/UL    ABS. LYMPHOCYTES PENDING K/UL    ABS. MONOCYTES PENDING K/UL    ABS. EOSINOPHILS PENDING K/UL    ABS. BASOPHILS PENDING K/UL    ABS. IMM. GRANS.  PENDING K/UL    DF PENDING    METABOLIC PANEL, COMPREHENSIVE    Collection Time: 04/16/21  6:30 AM   Result Value Ref Range    Sodium 141 136 - 145 mmol/L    Potassium 4.2 3.5 - 5.1 mmol/L    Chloride 113 (H) 97 - 108 mmol/L    CO2 22 21 - 32 mmol/L    Anion gap 6 5 - 15 mmol/L Glucose 134 (H) 65 - 100 mg/dL    BUN 45 (H) 6 - 20 mg/dL    Creatinine 2.08 (H) 0.55 - 1.02 mg/dL    BUN/Creatinine ratio 22 (H) 12 - 20      GFR est AA 27 (L) >60 ml/min/1.73m2    GFR est non-AA 22 (L) >60 ml/min/1.73m2    Calcium 8.4 (L) 8.5 - 10.1 mg/dL    Bilirubin, total 0.3 0.2 - 1.0 mg/dL    AST (SGOT) 11 (L) 15 - 37 U/L    ALT (SGPT) 15 12 - 78 U/L    Alk. phosphatase 45 45 - 117 U/L    Protein, total 6.5 6.4 - 8.2 g/dL    Albumin 2.3 (L) 3.5 - 5.0 g/dL    Globulin 4.2 (H) 2.0 - 4.0 g/dL    A-G Ratio 0.5 (L) 1.1 - 2.2         Recommendations/Plan:       #1 acute kidney injury on CKD 3:  -presented with a creatinine of 3.2 which has improved to 2.0, BUN 45  -Baseline creatinine seems to be 1.8 based on labs in February 2021  -TAMIKO could be prerenal secondary to losartan which I will continue to hold  -Urine is bland. 2.5 g of proteinuria  -will keep on IVF    #2 Hypertension:  -Blood pressure is better.    -Continue to hold losartan  -increased amlodipine 10 mg daily     #3 anemia  -Hemoglobin 9.3-->10.9.    -Likely anemia of chronic kidney disease. #4 Covid pneumonia  -Patient presented with shortness of breath. Covid positive  -Has been started on dexamethasone azithromycin and Rocephin  -no respiratory distress for now    #5 renal osteodystrophy  -Pending intact parathyroid hormone and 25-hydroxy vitamin D level.    -Calcium is okay we will check phosphorus level    #6 UTI  -Urine is suggestive of UTI.   She has been started on Rocephin          ________________________________________________________________________  Signed: Noe Mittal MD

## 2021-04-17 LAB
ALBUMIN SERPL-MCNC: 2.5 G/DL (ref 3.5–5)
ALBUMIN/GLOB SERPL: 0.6 {RATIO} (ref 1.1–2.2)
ALP SERPL-CCNC: 60 U/L (ref 45–117)
ALT SERPL-CCNC: 17 U/L (ref 12–78)
ANION GAP SERPL CALC-SCNC: 8 MMOL/L (ref 5–15)
AST SERPL W P-5'-P-CCNC: 15 U/L (ref 15–37)
BASOPHILS # BLD: 0 K/UL (ref 0–0.1)
BASOPHILS NFR BLD: 0 % (ref 0–1)
BILIRUB SERPL-MCNC: 0.4 MG/DL (ref 0.2–1)
BUN SERPL-MCNC: 41 MG/DL (ref 6–20)
BUN/CREAT SERPL: 21 (ref 12–20)
CA-I BLD-MCNC: 8.5 MG/DL (ref 8.5–10.1)
CHLORIDE SERPL-SCNC: 109 MMOL/L (ref 97–108)
CO2 SERPL-SCNC: 24 MMOL/L (ref 21–32)
CREAT SERPL-MCNC: 1.94 MG/DL (ref 0.55–1.02)
DIFFERENTIAL METHOD BLD: ABNORMAL
EOSINOPHIL # BLD: 0 K/UL (ref 0–0.4)
EOSINOPHIL NFR BLD: 0 % (ref 0–7)
ERYTHROCYTE [DISTWIDTH] IN BLOOD BY AUTOMATED COUNT: 19.3 % (ref 11.5–14.5)
GLOBULIN SER CALC-MCNC: 4.4 G/DL (ref 2–4)
GLUCOSE BLD STRIP.AUTO-MCNC: 137 MG/DL (ref 65–100)
GLUCOSE BLD STRIP.AUTO-MCNC: 179 MG/DL (ref 65–100)
GLUCOSE SERPL-MCNC: 127 MG/DL (ref 65–100)
HCT VFR BLD AUTO: 37.1 % (ref 35–47)
HGB BLD-MCNC: 11.5 G/DL (ref 11.5–16)
IMM GRANULOCYTES # BLD AUTO: 0.4 K/UL (ref 0–0.04)
IMM GRANULOCYTES NFR BLD AUTO: 4 % (ref 0–0.5)
LYMPHOCYTES # BLD: 0.6 K/UL (ref 0.8–3.5)
LYMPHOCYTES NFR BLD: 7 % (ref 12–49)
MCH RBC QN AUTO: 23.3 PG (ref 26–34)
MCHC RBC AUTO-ENTMCNC: 31 G/DL (ref 30–36.5)
MCV RBC AUTO: 75.1 FL (ref 80–99)
MONOCYTES # BLD: 0.3 K/UL (ref 0–1)
MONOCYTES NFR BLD: 3 % (ref 5–13)
NEUTS SEG # BLD: 7.7 K/UL (ref 1.8–8)
NEUTS SEG NFR BLD: 89 % (ref 32–75)
PERFORMED BY, TECHID: ABNORMAL
PERFORMED BY, TECHID: ABNORMAL
PLATELET # BLD AUTO: 403 K/UL (ref 150–400)
POTASSIUM SERPL-SCNC: 4.4 MMOL/L (ref 3.5–5.1)
PROT SERPL-MCNC: 6.9 G/DL (ref 6.4–8.2)
RBC # BLD AUTO: 4.94 M/UL (ref 3.8–5.2)
SODIUM SERPL-SCNC: 141 MMOL/L (ref 136–145)
WBC # BLD AUTO: 8.7 K/UL (ref 3.6–11)

## 2021-04-17 PROCEDURE — 85025 COMPLETE CBC W/AUTO DIFF WBC: CPT

## 2021-04-17 PROCEDURE — 74011250636 HC RX REV CODE- 250/636: Performed by: INTERNAL MEDICINE

## 2021-04-17 PROCEDURE — 74011250637 HC RX REV CODE- 250/637: Performed by: FAMILY MEDICINE

## 2021-04-17 PROCEDURE — 82962 GLUCOSE BLOOD TEST: CPT

## 2021-04-17 PROCEDURE — 74011250637 HC RX REV CODE- 250/637: Performed by: INTERNAL MEDICINE

## 2021-04-17 PROCEDURE — 80053 COMPREHEN METABOLIC PANEL: CPT

## 2021-04-17 PROCEDURE — 74011250636 HC RX REV CODE- 250/636: Performed by: FAMILY MEDICINE

## 2021-04-17 PROCEDURE — 65270000029 HC RM PRIVATE

## 2021-04-17 PROCEDURE — 36415 COLL VENOUS BLD VENIPUNCTURE: CPT

## 2021-04-17 PROCEDURE — 94762 N-INVAS EAR/PLS OXIMTRY CONT: CPT

## 2021-04-17 RX ORDER — DEXAMETHASONE SODIUM PHOSPHATE 4 MG/ML
2 INJECTION, SOLUTION INTRA-ARTICULAR; INTRALESIONAL; INTRAMUSCULAR; INTRAVENOUS; SOFT TISSUE EVERY 24 HOURS
Status: DISCONTINUED | OUTPATIENT
Start: 2021-04-18 | End: 2021-04-18 | Stop reason: HOSPADM

## 2021-04-17 RX ADMIN — CLONIDINE HYDROCHLORIDE 0.1 MG: 0.1 TABLET ORAL at 21:27

## 2021-04-17 RX ADMIN — AMLODIPINE BESYLATE 10 MG: 5 TABLET ORAL at 09:25

## 2021-04-17 RX ADMIN — CLONIDINE HYDROCHLORIDE 0.1 MG: 0.1 TABLET ORAL at 18:00

## 2021-04-17 RX ADMIN — HEPARIN SODIUM 5000 UNITS: 5000 INJECTION INTRAVENOUS; SUBCUTANEOUS at 14:00

## 2021-04-17 RX ADMIN — HEPARIN SODIUM 5000 UNITS: 5000 INJECTION INTRAVENOUS; SUBCUTANEOUS at 21:24

## 2021-04-17 RX ADMIN — DEXAMETHASONE SODIUM PHOSPHATE 2 MG: 4 INJECTION, SOLUTION INTRA-ARTICULAR; INTRALESIONAL; INTRAMUSCULAR; INTRAVENOUS; SOFT TISSUE at 04:52

## 2021-04-17 RX ADMIN — CLONIDINE HYDROCHLORIDE 0.1 MG: 0.1 TABLET ORAL at 12:41

## 2021-04-17 RX ADMIN — AZITHROMYCIN MONOHYDRATE 500 MG: 500 TABLET ORAL at 09:25

## 2021-04-17 RX ADMIN — HEPARIN SODIUM 5000 UNITS: 5000 INJECTION INTRAVENOUS; SUBCUTANEOUS at 04:52

## 2021-04-17 NOTE — PROGRESS NOTES
General Daily Progress Note          Patient Name:   Lila Sparks       YOB: 1928       Age:  80 y.o. Admit Date: 4/8/2021      Subjective: On room air, saturations 94%    Labs are still pending at this time -monitoring renal function for readiness for discharge      Objective:     Visit Vitals  BP (!) 179/88   Pulse 78   Temp 97.4 °F (36.3 °C)   Resp 18   Ht 5' 7.01\" (1.702 m)   Wt 61.8 kg (136 lb 3.2 oz)   SpO2 94%   Breastfeeding Unknown   BMI 21.33 kg/m²        Recent Results (from the past 24 hour(s))   GLUCOSE, POC    Collection Time: 04/17/21  8:02 AM   Result Value Ref Range    Glucose (POC) 137 (H) 65 - 100 mg/dL    Performed by [email or web address]      [unfilled]      Review of Systems    Constitutional: Negative for chills and fever. HENT: Negative. Eyes: Negative. Respiratory: Negative. Cardiovascular: Negative. Gastrointestinal: Negative for abdominal pain and nausea. Skin: Negative. Neurological: Negative. Physical Exam:      Constitutional: pt is oriented to person, place, and time. Awake and alert  HENT:   Head: Normocephalic and atraumatic. Eyes: Pupils are equal, round, and reactive to light. EOM are normal.   Cardiovascular: Normal rate, regular rhythm and normal heart sounds. Pulmonary/Chest: Globally diminished breath sounds most likely due to poor effort anterior and posterior. On room air. No increased work of breathing  Abdominal: Soft. Bowel sounds are normal. There is no abdominal tenderness. There is no rebound and no guarding. Musculoskeletal: Normal range of motion.    Neurological: No neurological deficits appreciated on exam    XR CHEST PORT   Final Result      XR CHEST SNGL V   Final Result           Recent Results (from the past 24 hour(s))   GLUCOSE, POC    Collection Time: 04/17/21  8:02 AM   Result Value Ref Range    Glucose (POC) 137 (H) 65 - 100 mg/dL    Performed by [facility]        Results Procedure Component Value Units Date/Time    COVID-19 RAPID TEST [[id number]]  (Abnormal) Collected: 04/09/21 0428    Order Status: Completed Specimen: Nasopharyngeal Updated: 04/09/21 0553     Specimen source Nasopharyngeal        COVID-19 rapid test DETECTED        Comment: Results verified, phoned to and read back by Barbie Cruz RN @ 0553/W3 Rapid [employer] ID Now   The specimen is POSITIVE for SARS-CoV-2, the novel coronavirus associated with COVID-19. This test has been authorized by the FDA under an Emergency Use Authorization (EUA) for use by authorized laboratories. Fact sheet for Healthcare Providers: Lot18date.co.nz Fact sheet for Patients: Meetingmix.com.co.nz   Methodology: Isothermal Nucleic Acid   Amplification         CULTURE, URINE [[id number]] Collected: 04/08/21 2000    Order Status: Completed Specimen: Urine Updated: 04/10/21 1143     Special Requests: --        No Special Requests  Reflexed from [id number]       Culture result: No Growth (<1000 cfu/mL)              Labs:     Recent Labs     04/16/21  0630 04/15/21  0550   WBC 7.2 6.4   HGB 10.7* 10.2*   HCT 34.2* 31.9*    312     Recent Labs     04/16/21  0630 04/15/21  0550     142 142   K 4.2  4.4 4.5   *  113* 114*   CO2 22  20* 19*   BUN 45*  46* 48*   CREA 2.08*  2.04* 2.17*   *  131* 179*   CA 8.4*  8.1* 8.3*   PHOS 3.5  --      Recent Labs     04/16/21  0630 04/15/21  0550   ALT 15 15   AP 45 41*   TBILI 0.3 0.3   TP 6.5 6.2*   ALB 2.3*  2.5* 2.3*   GLOB 4.2* 3.9     No results for input(s): INR, PTP, APTT, INREXT, INREXT in the last 72 hours. No results for input(s): FE, TIBC, PSAT, FERR in the last 72 hours. No results found for: FOL, RBCF   No results for input(s): PH, PCO2, PO2 in the last 72 hours. No results for input(s): CPK, CKNDX, TROIQ in the last 72 hours.     No lab exists for component: CPKMB  No results found for: CHOL, [facility], [facility], [address], HDL, HDLP, LDL, LDLC, DLDLP, TGLX, TRIGL, TRIGP, CHHD, CHHDX  Lab Results   Component Value Date/Time    Glucose (POC) 137 (H) 04/17/2021 08:02 AM    Glucose (POC) 205 (H) 04/12/2021 11:07 AM    Glucose (POC) 245 (H) 04/12/2021 08:02 AM    Glucose (POC) 266 (H) 04/10/2021 07:40 PM    Glucose (POC) 366 (H) 04/10/2021 02:33 PM     Lab Results   Component Value Date/Time    Color Yellow 04/08/2021 08:00 PM    Appearance Turbid (A) 04/08/2021 08:00 PM    Specific gravity 1.015 04/08/2021 08:00 PM    pH (UA) 5.0 04/08/2021 08:00 PM    Protein 100 (A) 04/08/2021 08:00 PM    Glucose Negative 04/08/2021 08:00 PM    Ketone Negative 04/08/2021 08:00 PM    Bilirubin Negative 04/08/2021 08:00 PM    Urobilinogen 0.1 04/08/2021 08:00 PM    Nitrites Negative 04/08/2021 08:00 PM    Leukocyte Esterase Trace (A) 04/08/2021 08:00 PM    Bacteria 4+ (A) 04/08/2021 08:00 PM    WBC 20-50 04/08/2021 08:00 PM    RBC 0-5 04/08/2021 08:00 PM         Assessment:   COVID-19 pneumonitis  TAMIKO on CKD 3  Hypertension, chronic  Microcytic anemia of chronic diseaseat baseline    Plan:   Pulmonary following  Decadron was decreased to 2 mg every 24 hours today  On azithromycin by mouth  Nephrology following  Continues on half-normal saline IV fluids  Losartan still being held  Amlodipine was increased to 10 mg -still hypertensive 170s over 80s  PT OT working with patient  Monitor labsa.m. labs ordered, today's are still pending  Urine culture is negative    Patient has been accepted to skilled nursing facility, will require clearance from nephrology and pulmonology. Still monitoring renal function and blood pressure after medication changes.      Heparin for DVT prophylaxis    Case management assisting with discharge planning    Discussed with Dr. Dennis Wong          Current Facility-Administered Medications:   Janak Groves ON 4/18/2021] dexamethasone (DECADRON) 4 mg/mL injection 2 mg, 2 mg, IntraVENous, Q24H, Angeles Woodruff MD    cloNIDine HCL (CATAPRES) tablet 0.1 mg, 0.1 mg, Oral, Q6H, Yoseph Tanner MD, 0.1 mg at 04/16/21 1800    azithromycin (ZITHROMAX) tablet 500 mg, 500 mg, Oral, DAILY, Reynaldo Garcia MD, 500 mg at 04/17/21 0925    0.45% sodium chloride infusion, 50 mL/hr, IntraVENous, CONTINUOUS, Chuyita Acevedo MD, Last Rate: 50 mL/hr at 04/16/21 2137, 50 mL/hr at 04/16/21 2137    amLODIPine (NORVASC) tablet 10 mg, 10 mg, Oral, DAILY, Chuyita Acevedo MD, 10 mg at 04/17/21 5549    acetaminophen (TYLENOL) tablet 650 mg, 650 mg, Oral, Q6H PRN **OR** acetaminophen (TYLENOL) suppository 650 mg, 650 mg, Rectal, Q6H PRN, Reynaldo Garcia MD    polyethylene glycol (MIRALAX) packet 17 g, 17 g, Oral, DAILY PRN, Reynaldo Garcia MD    ondansetron (ZOFRAN ODT) tablet 4 mg, 4 mg, Oral, Q8H PRN **OR** ondansetron (ZOFRAN) injection 4 mg, 4 mg, IntraVENous, Q6H PRN, Reynaldo Garcia MD    heparin (porcine) injection 5,000 Units, 5,000 Units, SubCUTAneous, Q8H, Reynaldo Garcia MD, 5,000 Units at 04/17/21 9949

## 2021-04-17 NOTE — CONSULTS
PULMONARY NOTE  [facility]    Name: Erendira Hardin MRN: [id number]   : 1928 Hospital: [address] [place] [address]   Date: 2021  Admission date: 2021 Hospital Day: 10       HPI:     Hospital Problems  Date Reviewed: 2020          Codes Class Noted POA    Pneumonia ICD-10-CM: J18.9  ICD-9-CM: 458  2021 Unknown        COVID-19 ICD-10-CM: U07.1  ICD-9-CM: 079.89  2021 Unknown                   [x] High complexity decision making was performed  [x] See my orders for details      Subjective/Initial History:     I was asked by Magi Erickson MD to see Erendira Hardin  a 80 y.o.  female in consultation     Excerpts from admission 2021 or consult notes as follows:   59-year-old lady came in because of shortness of breath she is a nursing home resident past medical history of COVID-19 pneumonia hypertension hypothyroidism chronic kidney disease and insomnia. She was not feeling well Covid test came back positive chest x-ray shows left lower lobe infiltrate so admitted and pulmonary consult was called for further evaluation      No Known Allergies     MAR reviewed and pertinent medications noted or modified as needed     Current Facility-Administered Medications   Medication    dexamethasone (DECADRON) 4 mg/mL injection 2 mg    cloNIDine HCL (CATAPRES) tablet 0.1 mg    azithromycin (ZITHROMAX) tablet 500 mg    0.45% sodium chloride infusion    amLODIPine (NORVASC) tablet 10 mg    acetaminophen (TYLENOL) tablet 650 mg    Or    acetaminophen (TYLENOL) suppository 650 mg    polyethylene glycol (MIRALAX) packet 17 g    ondansetron (ZOFRAN ODT) tablet 4 mg    Or    ondansetron (ZOFRAN) injection 4 mg    heparin (porcine) injection 5,000 Units      Patient PCP: Soledad, MD Calvin  PMH:  has a past medical history of Anemia, Chronic kidney disease, COVID-19, Hypertension, Hypothyroidism, and Insomnia. PSH:   has no past surgical history on file.    FHX: Family history is unknown by patient. SHX:  reports that she has never smoked. She has never used smokeless tobacco.     ROS:  Unable to obtain      Objective:     Vital Signs: Telemetry:    normal sinus rhythm Intake/Output:   Visit Vitals  BP (!) 179/88   Pulse 78   Temp 97.4 °F (36.3 °C)   Resp 18   Ht 5' 7.01\" (1.702 m)   Wt 61.8 kg (136 lb 3.2 oz)   SpO2 94%   Breastfeeding Unknown   BMI 21.33 kg/m²       Temp (24hrs), Av.7 °F (36.5 °C), Min:97.4 °F (36.3 °C), Max:98.1 °F (36.7 °C)        O2 Device: None (Room air), Nasal cannula O2 Flow Rate (L/min): 3 l/min       Wt Readings from Last 4 Encounters:   04/15/21 61.8 kg (136 lb 3.2 oz)   02/15/21 68 kg (150 lb)        No intake or output data in the 24 hours ending 21 1027    Last shift:      No intake/output data recorded. Last 3 shifts: No intake/output data recorded. Physical Exam:     Physical Exam   Constitutional: She appears distressed. HENT:   Head: Normocephalic and atraumatic. Eyes: Pupils are equal, round, and reactive to light. Conjunctivae and EOM are normal.   Neck: Normal range of motion. Neck supple. Cardiovascular: Normal rate and regular rhythm. Pulmonary/Chest: Effort normal. She has rales. Musculoskeletal: Normal range of motion. Neurological: She is alert. Labs:    Recent Labs     21  0630 04/15/21  0550   WBC 7.2 6.4   HGB 10.7* 10.2*    312     Recent Labs     21  0630 04/15/21  0550     142 142   K 4.2  4.4 4.5   *  113* 114*   CO2 22  20* 19*   *  131* 179*   BUN 45*  46* 48*   CREA 2.08*  2.04* 2.17*   CA 8.4*  8.1* 8.3*   PHOS 3.5  --    ALB 2.3*  2.5* 2.3*   ALT 15 15     No results for input(s): PH, PCO2, PO2, HCO3, FIO2 in the last 72 hours. No results for input(s): CPK, CKNDX, TROIQ in the last 72 hours.     No lab exists for component: CPKMB  No results found for: BNPP, BNP   Lab Results   Component Value Date/Time    Culture result: No Growth (<1000 cfu/mL) 04/08/2021 08:00 PM   No results found for: TSH, TSHEXT, TSHEXT    Imaging:    CXR Results  (Last 48 hours)               04/15/21 1202  XR CHEST PORT Final result    Narrative:  Chest single view. Comparison single view chest April 6, 2021       More dense patchy opacity noted left lung base. No interstitial or alveolar   pulmonary edema. Cardiac and mediastinal structures unchanged with likely great   vessel ectasia. Thoracic aorta atherosclerosis. No pneumothorax. Results from [place] encounter on 04/08/21   XR CHEST PORT    Narrative Chest single view. Comparison single view chest April 6, 2021    More dense patchy opacity noted left lung base. No interstitial or alveolar  pulmonary edema. Cardiac and mediastinal structures unchanged with likely great  vessel ectasia. Thoracic aorta atherosclerosis. No pneumothorax. XR CHEST SNGL V    Narrative History: Sepsis? Single view of the chest was obtained. Heart size is enlarged. This is an  increased patchy airspace disease in the lung bases especially on the left  retrocardiac region. Upper lungs are clear. No effusions or pneumothorax. Bony  structures appear osteopenic. There are degenerative changes. No acute bony  abnormality. Pression: Changes consistent with left lower lobe infiltrate. Follow-up chest  x-rays recommended. Results from [place] encounter on 02/15/21   CT SPINE CERV WO CONT    Narrative Fall. Comparison CT of the cervical spine 6/13/2016. Technique: Axial images cervical spine with sagittal and coronal reformats. 3-D  MIPs. Dose reduction: All CT scans at this facility are performed using dose reduction  optimization techniques as appropriate to a performed exam including the  following: Automated exposure control, adjustments of the mA and/or kV according  to patient's size, or use of iterative reconstruction technique.     FINDINGS: The bones are demineralized which limits evaluation for fracture. No  listhesis. C6, C7, T1 vertebral body compressions are unchanged. T4 vertebral  body almost complete compression appears nonacute but has occurred in the  interim. No evident acute fracture, jumped or perched facet. Multilevel disc  greater than placenta narrowing, sclerosis, osteophytosis. 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 Lateral pillars similar alignment as previous. No prevertebral soft tissue  swelling. Lung apices clear. Impression 1. No acute bony findings. 2. T4 vertebral body compression has occurred in the interim but appears  nonacute. Correlate for any focal pain. IMPRESSION:   1. Acute hypoxic respiratory failure  2. COVID-19 pneumonia  3. Chronic kidney disease  4. Hypertension and insomnia by history  5. Prognosis guarded       RECOMMENDATIONS/PLAN:     1. Oxygen 1 Liter via nasal Cannula will start weaning   2. Patient is on Decadron Zithromax and heparin will decrease Decadron discontinue Rocephin and any antibiotic can cause C. Difficile chest x-ray shows left lower lobe retrocardiac infiltrate   3.  Continue with IV fluid        Denise Canales MD

## 2021-04-17 NOTE — PROGRESS NOTES
Problem: Pressure Injury - Risk of  Goal: *Prevention of pressure injury  Description: Document Leo Scale and appropriate interventions in the flowsheet. Outcome: Progressing Towards Goal  Note: Pressure Injury Interventions:  Sensory Interventions: Discuss PT/OT consult with provider, Assess need for specialty bed, Assess changes in LOC    Moisture Interventions: Check for incontinence Q2 hours and as needed, Assess need for specialty bed    Activity Interventions: PT/OT evaluation, Increase time out of bed    Mobility Interventions: HOB 30 degrees or less, PT/OT evaluation    Nutrition Interventions: Discuss nutritional consult with provider    Friction and Shear Interventions: HOB 30 degrees or less                Problem: Patient Education: Go to Patient Education Activity  Goal: Patient/Family Education  Outcome: Progressing Towards Goal     Problem: Falls - Risk of  Goal: *Absence of Falls  Description: Document Gavin Fall Risk and appropriate interventions in the flowsheet. Outcome: Progressing Towards Goal  Note: Fall Risk Interventions:       Mentation Interventions: Adequate sleep, hydration, pain control, Bed/chair exit alarm    Medication Interventions: Bed/chair exit alarm, Evaluate medications/consider consulting pharmacy    Elimination Interventions: Call light in reach, Bed/chair exit alarm              Problem: Patient Education: Go to Patient Education Activity  Goal: Patient/Family Education  Outcome: Progressing Towards Goal     Problem: Impaired Skin Integrity/Pressure Injury Treatment  Goal: *Improvement of Existing Pressure Injury  Outcome: Progressing Towards Goal  Goal: *Prevention of pressure injury  Description: Document Leo Scale and appropriate interventions in the flowsheet.   Outcome: Progressing Towards Goal  Note: Pressure Injury Interventions:  Sensory Interventions: Discuss PT/OT consult with provider, Assess need for specialty bed, Assess changes in LOC    Moisture Interventions: Check for incontinence Q2 hours and as needed, Assess need for specialty bed    Activity Interventions: PT/OT evaluation, Increase time out of bed    Mobility Interventions: HOB 30 degrees or less, PT/OT evaluation    Nutrition Interventions: Discuss nutritional consult with provider    Friction and Shear Interventions: HOB 30 degrees or less                Problem: Patient Education: Go to Patient Education Activity  Goal: Patient/Family Education  Outcome: Progressing Towards Goal     Problem: Patient Education: Go to Patient Education Activity  Goal: Patient/Family Education  Outcome: Progressing Towards Goal     Problem: Patient Education: Go to Patient Education Activity  Goal: Patient/Family Education  Outcome: Progressing Towards Goal     Problem: Nutrition Deficit  Goal: *Optimize nutritional status  Outcome: Progressing Towards Goal

## 2021-04-17 NOTE — PROGRESS NOTES
Renal progress Note    NAME:  Lila Sparks   :   1928   MRN:   [id number]     ATTENDING: Concha Wu MD  PCP:  Shirley Peguero MD    Date/Time:  2021 2:25 PM      Subjective:   Patient seen . Seems to be in no distress. Creatinine has improved to 2.0. Better  BP    Past Medical History:   Diagnosis Date    Anemia     Chronic kidney disease     COVID-19     Hypertension     Hypothyroidism     Insomnia       History reviewed. No pertinent surgical history. Social History     Tobacco Use    Smoking status: Never Smoker    Smokeless tobacco: Never Used   Substance Use Topics    Alcohol use: Not on file      Family History   Family history unknown: Yes       No Known Allergies   Prior to Admission medications    Medication Sig Start Date End Date Taking? Authorizing Provider   ergocalciferol, vitamin D2, (VITAMIN D2 PO) Take  by mouth. Provider, Historical   trazodone HCl (TRAZODONE PO) Take  by mouth. Provider, Historical   LOSARTAN PO Take  by mouth. Provider, Historical   OXCARBAZEPINE PO Take  by mouth. Provider, Historical       REVIEW OF SYSTEMS:       Constitutional: Negative for chills and fever. HENT: Negative. Eyes: Negative. Respiratory: As above   cardiovascular: Negative. Gastrointestinal: Negative for abdominal pain and nausea. Skin: Negative. Neurological: Negative. Objective:   VITALS:    Visit Vitals  BP (!) 179/88   Pulse 78   Temp 97.4 °F (36.3 °C)   Resp 18   Ht 5' 7.01\" (1.702 m)   Wt 61.8 kg (136 lb 3.2 oz)   SpO2 94%   Breastfeeding Unknown   BMI 21.33 kg/m²     Temp (24hrs), Av.7 °F (36.5 °C), Min:97.4 °F (36.3 °C), Max:98.1 °F (36.7 °C)      PHYSICAL EXAM:   General:    Alert, cooperative, no distress.      HEENT: Atraumatic, anicteric sclerae, pink conjunctivae     No oral ulcers, mucosa moist, throat clear  Neck:  Supple, symmetrical,  thyroid: non tender  Lungs:   Wheezing and rhonchi are heard chest wall:  No tenderness  No Accessory muscle use. Heart:   Regular  rhythm,  No  murmur   No edema  Abdomen:   Soft, non-tender. Bowel sounds normal  Extremities: No cyanosis. No clubbing  Skin:     Not pale. Not Jaundiced  No rashes   Psych:  Not anxious or agitated. Neurologic: Alert     LAB DATA REVIEWED:    Recent Results (from the past 24 hour(s))   GLUCOSE, POC    Collection Time: 04/17/21  8:02 AM   Result Value Ref Range    Glucose (POC) 137 (H) 65 - 100 mg/dL    Performed by Saqib Maya        Recommendations/Plan:       #1 acute kidney injury on CKD 3:  -presented with a creatinine of 3.2 which has improved to 2.0, BUN 45  -Baseline creatinine seems to be 1.8 based on labs in February 2021  -TAMIKO could be prerenal secondary to losartan which I will continue to hold  -Urine is bland. 2.5 g of proteinuria  -will keep on IVF    #2 Hypertension:  -Blood pressure is better.    -Continue to hold losartan  -increased amlodipine 10 mg daily     #3 anemia  -Hemoglobin 9.3-->10.9.    -Likely anemia of chronic kidney disease. #4 Covid pneumonia  -Patient presented with shortness of breath. Covid positive  -Has been started on dexamethasone azithromycin and Rocephin  -no respiratory distress for now    #5 renal osteodystrophy  -Pending intact parathyroid hormone and 25-hydroxy vitamin D level.    -Calcium is okay we will check phosphorus level    #6 UTI  -Urine is suggestive of UTI.   She has been started on Rocephin          ________________________________________________________________________  Signed: Shannon Hoff MD

## 2021-04-18 VITALS
DIASTOLIC BLOOD PRESSURE: 77 MMHG | TEMPERATURE: 98.5 F | HEART RATE: 74 BPM | WEIGHT: 136.2 LBS | SYSTOLIC BLOOD PRESSURE: 122 MMHG | RESPIRATION RATE: 17 BRPM | BODY MASS INDEX: 21.38 KG/M2 | HEIGHT: 67 IN | OXYGEN SATURATION: 96 %

## 2021-04-18 LAB
ALBUMIN SERPL-MCNC: 2.6 G/DL (ref 3.5–5)
ANION GAP SERPL CALC-SCNC: 7 MMOL/L (ref 5–15)
ANION GAP SERPL CALC-SCNC: 8 MMOL/L (ref 5–15)
BASOPHILS # BLD: 0 K/UL (ref 0–0.1)
BASOPHILS NFR BLD: 0 % (ref 0–1)
BUN SERPL-MCNC: 39 MG/DL (ref 6–20)
BUN SERPL-MCNC: 39 MG/DL (ref 6–20)
BUN/CREAT SERPL: 21 (ref 12–20)
BUN/CREAT SERPL: 21 (ref 12–20)
CA-I BLD-MCNC: 8.4 MG/DL (ref 8.5–10.1)
CA-I BLD-MCNC: 8.4 MG/DL (ref 8.5–10.1)
CHLORIDE SERPL-SCNC: 109 MMOL/L (ref 97–108)
CHLORIDE SERPL-SCNC: 109 MMOL/L (ref 97–108)
CO2 SERPL-SCNC: 23 MMOL/L (ref 21–32)
CO2 SERPL-SCNC: 24 MMOL/L (ref 21–32)
CREAT SERPL-MCNC: 1.84 MG/DL (ref 0.55–1.02)
CREAT SERPL-MCNC: 1.85 MG/DL (ref 0.55–1.02)
DIFFERENTIAL METHOD BLD: ABNORMAL
EOSINOPHIL # BLD: 0 K/UL (ref 0–0.4)
EOSINOPHIL NFR BLD: 0 % (ref 0–7)
ERYTHROCYTE [DISTWIDTH] IN BLOOD BY AUTOMATED COUNT: 18.7 % (ref 11.5–14.5)
GLUCOSE SERPL-MCNC: 111 MG/DL (ref 65–100)
GLUCOSE SERPL-MCNC: 111 MG/DL (ref 65–100)
HCT VFR BLD AUTO: 37 % (ref 35–47)
HGB BLD-MCNC: 11.5 G/DL (ref 11.5–16)
IMM GRANULOCYTES # BLD AUTO: 0 K/UL
IMM GRANULOCYTES NFR BLD AUTO: 0 %
LYMPHOCYTES # BLD: 0.8 K/UL (ref 0.8–3.5)
LYMPHOCYTES NFR BLD: 11 % (ref 12–49)
MCH RBC QN AUTO: 23.7 PG (ref 26–34)
MCHC RBC AUTO-ENTMCNC: 31.1 G/DL (ref 30–36.5)
MCV RBC AUTO: 76.3 FL (ref 80–99)
MONOCYTES # BLD: 0.3 K/UL (ref 0–1)
MONOCYTES NFR BLD: 4 % (ref 5–13)
NEUTS SEG # BLD: 6.5 K/UL (ref 1.8–8)
NEUTS SEG NFR BLD: 85 % (ref 32–75)
PHOSPHATE SERPL-MCNC: 3.4 MG/DL (ref 2.6–4.7)
PLATELET # BLD AUTO: 372 K/UL (ref 150–400)
POTASSIUM SERPL-SCNC: 4.4 MMOL/L (ref 3.5–5.1)
POTASSIUM SERPL-SCNC: 4.5 MMOL/L (ref 3.5–5.1)
RBC # BLD AUTO: 4.85 M/UL (ref 3.8–5.2)
RBC MORPH BLD: ABNORMAL
SODIUM SERPL-SCNC: 140 MMOL/L (ref 136–145)
SODIUM SERPL-SCNC: 140 MMOL/L (ref 136–145)
WBC # BLD AUTO: 7.6 K/UL (ref 3.6–11)

## 2021-04-18 PROCEDURE — 80048 BASIC METABOLIC PNL TOTAL CA: CPT

## 2021-04-18 PROCEDURE — 80069 RENAL FUNCTION PANEL: CPT

## 2021-04-18 PROCEDURE — 74011250636 HC RX REV CODE- 250/636: Performed by: INTERNAL MEDICINE

## 2021-04-18 PROCEDURE — 85025 COMPLETE CBC W/AUTO DIFF WBC: CPT

## 2021-04-18 PROCEDURE — 74011250637 HC RX REV CODE- 250/637: Performed by: INTERNAL MEDICINE

## 2021-04-18 PROCEDURE — 74011250637 HC RX REV CODE- 250/637: Performed by: FAMILY MEDICINE

## 2021-04-18 PROCEDURE — 36415 COLL VENOUS BLD VENIPUNCTURE: CPT

## 2021-04-18 PROCEDURE — 74011250636 HC RX REV CODE- 250/636: Performed by: FAMILY MEDICINE

## 2021-04-18 RX ORDER — AZITHROMYCIN 500 MG/1
500 TABLET, FILM COATED ORAL DAILY
Qty: 3 TAB | Refills: 0 | Status: SHIPPED | OUTPATIENT
Start: 2021-04-19

## 2021-04-18 RX ORDER — AMLODIPINE BESYLATE 10 MG/1
10 TABLET ORAL DAILY
Qty: 30 TAB | Refills: 0 | Status: SHIPPED | OUTPATIENT
Start: 2021-04-19

## 2021-04-18 RX ORDER — CLONIDINE HYDROCHLORIDE 0.1 MG/1
0.1 TABLET ORAL EVERY 6 HOURS
Qty: 30 TAB | Refills: 0 | Status: SHIPPED | OUTPATIENT
Start: 2021-04-18

## 2021-04-18 RX ORDER — DEXAMETHASONE 4 MG/1
TABLET ORAL
Qty: 10 TAB | Refills: 0 | Status: SHIPPED | OUTPATIENT
Start: 2021-04-18

## 2021-04-18 RX ADMIN — AZITHROMYCIN MONOHYDRATE 500 MG: 500 TABLET ORAL at 08:56

## 2021-04-18 RX ADMIN — AMLODIPINE BESYLATE 10 MG: 5 TABLET ORAL at 08:56

## 2021-04-18 RX ADMIN — HEPARIN SODIUM 5000 UNITS: 5000 INJECTION INTRAVENOUS; SUBCUTANEOUS at 03:28

## 2021-04-18 RX ADMIN — CLONIDINE HYDROCHLORIDE 0.1 MG: 0.1 TABLET ORAL at 12:08

## 2021-04-18 RX ADMIN — DEXAMETHASONE SODIUM PHOSPHATE 2 MG: 4 INJECTION, SOLUTION INTRAMUSCULAR; INTRAVENOUS at 03:53

## 2021-04-18 RX ADMIN — HEPARIN SODIUM 5000 UNITS: 5000 INJECTION INTRAVENOUS; SUBCUTANEOUS at 13:56

## 2021-04-18 NOTE — DISCHARGE SUMMARY
General Daily Progress Note          Patient Name:   Nubia Olsen       YOB: 1928       Age:  80 y.o. Admit Date: 4/8/2021      Subjective:         Patient awake chest x-ray showed dense consolidation  Objective:     Visit Vitals  BP (!) 157/93   Pulse 86   Temp 97.6 °F (36.4 °C)   Resp 16   Ht 5' 7.01\" (1.702 m)   Wt 61.8 kg (136 lb 3.2 oz)   SpO2 93%   Breastfeeding Unknown   BMI 21.33 kg/m²        Recent Results (from the past 24 hour(s))   RENAL FUNCTION PANEL    Collection Time: 04/18/21  9:03 AM   Result Value Ref Range    Sodium 140 136 - 145 mmol/L    Potassium 4.4 3.5 - 5.1 mmol/L    Chloride 109 (H) 97 - 108 mmol/L    CO2 24 21 - 32 mmol/L    Anion gap 7 5 - 15 mmol/L    Glucose 111 (H) 65 - 100 mg/dL    BUN 39 (H) 6 - 20 mg/dL    Creatinine 1.85 (H) 0.55 - 1.02 mg/dL    BUN/Creatinine ratio 21 (H) 12 - 20      GFR est AA 31 (L) >60 ml/min/1.73m2    GFR est non-AA 25 (L) >60 ml/min/1.73m2    Calcium 8.4 (L) 8.5 - 10.1 mg/dL    Phosphorus 3.4 2.6 - 4.7 mg/dL    Albumin 2.6 (L) 3.5 - 5.0 g/dL   CBC WITH AUTOMATED DIFF    Collection Time: 04/18/21  9:03 AM   Result Value Ref Range    WBC 7.6 3.6 - 11.0 K/uL    RBC 4.85 3.80 - 5.20 M/uL    HGB 11.5 11.5 - 16.0 g/dL    HCT 37.0 35.0 - 47.0 %    MCV 76.3 (L) 80.0 - 99.0 FL    MCH 23.7 (L) 26.0 - 34.0 PG    MCHC 31.1 30.0 - 36.5 g/dL    RDW 18.7 (H) 11.5 - 14.5 %    PLATELET 285 037 - 354 K/uL    NEUTROPHILS PENDING %    LYMPHOCYTES PENDING %    MONOCYTES PENDING %    EOSINOPHILS PENDING %    BASOPHILS PENDING %    IMMATURE GRANULOCYTES PENDING %    ABS. NEUTROPHILS PENDING K/UL    ABS. LYMPHOCYTES PENDING K/UL    ABS. MONOCYTES PENDING K/UL    ABS. EOSINOPHILS PENDING K/UL    ABS. BASOPHILS PENDING K/UL    ABS. IMM. GRANS.  PENDING K/UL    DF PENDING    METABOLIC PANEL, BASIC    Collection Time: 04/18/21  9:03 AM   Result Value Ref Range    Sodium 140 136 - 145 mmol/L    Potassium 4.5 3.5 - 5.1 mmol/L    Chloride 109 (H) 97 - 108 mmol/L CO2 23 21 - 32 mmol/L    Anion gap 8 5 - 15 mmol/L    Glucose 111 (H) 65 - 100 mg/dL    BUN 39 (H) 6 - 20 mg/dL    Creatinine 1.84 (H) 0.55 - 1.02 mg/dL    BUN/Creatinine ratio 21 (H) 12 - 20      GFR est AA 31 (L) >60 ml/min/1.73m2    GFR est non-AA 26 (L) >60 ml/min/1.73m2    Calcium 8.4 (L) 8.5 - 10.1 mg/dL     [unfilled]      Review of Systems    Constitutional: Negative for chills and fever. HENT: Negative. Eyes: Negative. Respiratory: Negative. Cardiovascular: Negative. Gastrointestinal: Negative for abdominal pain and nausea. Skin: Negative. Neurological: Negative. Physical Exam:      Constitutional: pt is oriented to person, place, and time. Awake and alert  HENT:   Head: Normocephalic and atraumatic. Eyes: Pupils are equal, round, and reactive to light. EOM are normal.   Cardiovascular: Normal rate, regular rhythm and normal heart sounds. Pulmonary/Chest: Globally diminished breath sounds most likely due to poor effort anterior and posterior. On room air. No increased work of breathing  Abdominal: Soft. Bowel sounds are normal. There is no abdominal tenderness. There is no rebound and no guarding. Musculoskeletal: Normal range of motion.    Neurological: No neurological deficits appreciated on exam    XR CHEST PORT   Final Result      XR CHEST SNGL V   Final Result           Recent Results (from the past 24 hour(s))   RENAL FUNCTION PANEL    Collection Time: 04/18/21  9:03 AM   Result Value Ref Range    Sodium 140 136 - 145 mmol/L    Potassium 4.4 3.5 - 5.1 mmol/L    Chloride 109 (H) 97 - 108 mmol/L    CO2 24 21 - 32 mmol/L    Anion gap 7 5 - 15 mmol/L    Glucose 111 (H) 65 - 100 mg/dL    BUN 39 (H) 6 - 20 mg/dL    Creatinine 1.85 (H) 0.55 - 1.02 mg/dL    BUN/Creatinine ratio 21 (H) 12 - 20      GFR est AA 31 (L) >60 ml/min/1.73m2    GFR est non-AA 25 (L) >60 ml/min/1.73m2    Calcium 8.4 (L) 8.5 - 10.1 mg/dL    Phosphorus 3.4 2.6 - 4.7 mg/dL    Albumin 2.6 (L) 3.5 - 5.0 g/dL   CBC WITH AUTOMATED DIFF    Collection Time: 04/18/21  9:03 AM   Result Value Ref Range    WBC 7.6 3.6 - 11.0 K/uL    RBC 4.85 3.80 - 5.20 M/uL    HGB 11.5 11.5 - 16.0 g/dL    HCT 37.0 35.0 - 47.0 %    MCV 76.3 (L) 80.0 - 99.0 FL    MCH 23.7 (L) 26.0 - 34.0 PG    MCHC 31.1 30.0 - 36.5 g/dL    RDW 18.7 (H) 11.5 - 14.5 %    PLATELET 165 163 - 366 K/uL    NEUTROPHILS PENDING %    LYMPHOCYTES PENDING %    MONOCYTES PENDING %    EOSINOPHILS PENDING %    BASOPHILS PENDING %    IMMATURE GRANULOCYTES PENDING %    ABS. NEUTROPHILS PENDING K/UL    ABS. LYMPHOCYTES PENDING K/UL    ABS. MONOCYTES PENDING K/UL    ABS. EOSINOPHILS PENDING K/UL    ABS. BASOPHILS PENDING K/UL    ABS. IMM. GRANS. PENDING K/UL    DF PENDING    METABOLIC PANEL, BASIC    Collection Time: 04/18/21  9:03 AM   Result Value Ref Range    Sodium 140 136 - 145 mmol/L    Potassium 4.5 3.5 - 5.1 mmol/L    Chloride 109 (H) 97 - 108 mmol/L    CO2 23 21 - 32 mmol/L    Anion gap 8 5 - 15 mmol/L    Glucose 111 (H) 65 - 100 mg/dL    BUN 39 (H) 6 - 20 mg/dL    Creatinine 1.84 (H) 0.55 - 1.02 mg/dL    BUN/Creatinine ratio 21 (H) 12 - 20      GFR est AA 31 (L) >60 ml/min/1.73m2    GFR est non-AA 26 (L) >60 ml/min/1.73m2    Calcium 8.4 (L) 8.5 - 10.1 mg/dL       Results     Procedure Component Value Units Date/Time    COVID-19 RAPID TEST [[id number]]  (Abnormal) Collected: 04/09/21 0428    Order Status: Completed Specimen: Nasopharyngeal Updated: 04/09/21 0553     Specimen source Nasopharyngeal        COVID-19 rapid test DETECTED        Comment: Results verified, phoned to and read back by Sandi Lord, RN @ 0553/W3 Rapid [employer] ID Now   The specimen is POSITIVE for SARS-CoV-2, the novel coronavirus associated with COVID-19. This test has been authorized by the FDA under an Emergency Use Authorization (EUA) for use by authorized laboratories.    Fact sheet for Healthcare Providers: ConventionUpdate.co.nz Fact sheet for Patients: iTendency.uy   Methodology: Isothermal Nucleic Acid   Amplification         CULTURE, URINE [[id number]] Collected: 04/08/21 2000    Order Status: Completed Specimen: Urine Updated: 04/10/21 1143     Special Requests: --        No Special Requests  Reflexed from [id number]       Culture result: No Growth (<1000 cfu/mL)              Labs:     Recent Labs     04/18/21 0903 04/17/21  0952   WBC 7.6 8.7   HGB 11.5 11.5   HCT 37.0 37.1    403*     Recent Labs     04/18/21 0903 04/17/21  0952 04/16/21  0630     140 141 141  142   K 4.5  4.4 4.4 4.2  4.4   *  109* 109* 113*  113*   CO2 23  24 24 22  20*   BUN 39*  39* 41* 45*  46*   CREA 1.84*  1.85* 1.94* 2.08*  2.04*   *  111* 127* 134*  131*   CA 8.4*  8.4* 8.5 8.4*  8.1*   PHOS 3.4  --  3.5     Recent Labs     04/18/21 0903 04/17/21  0952 04/16/21  0630   ALT  --  17 15   AP  --  60 45   TBILI  --  0.4 0.3   TP  --  6.9 6.5   ALB 2.6* 2.5* 2.3*  2.5*   GLOB  --  4.4* 4.2*     No results for input(s): INR, PTP, APTT, INREXT, INREXT in the last 72 hours. No results for input(s): FE, TIBC, PSAT, FERR in the last 72 hours. No results found for: FOL, RBCF   No results for input(s): PH, PCO2, PO2 in the last 72 hours. No results for input(s): CPK, CKNDX, TROIQ in the last 72 hours.     No lab exists for component: CPKMB  No results found for: CHOL, CHOLX, CHLST, CHOLV, HDL, HDLP, LDL, LDLC, DLDLP, TGLX, TRIGL, TRIGP, CHHD, CHHDX  Lab Results   Component Value Date/Time    Glucose (POC) 179 (H) 04/17/2021 11:42 AM    Glucose (POC) 137 (H) 04/17/2021 08:02 AM    Glucose (POC) 205 (H) 04/12/2021 11:07 AM    Glucose (POC) 245 (H) 04/12/2021 08:02 AM    Glucose (POC) 266 (H) 04/10/2021 07:40 PM     Lab Results   Component Value Date/Time    Color Yellow 04/08/2021 08:00 PM    Appearance Turbid (A) 04/08/2021 08:00 PM    Specific gravity 1.015 04/08/2021 08:00 PM    pH (UA) 5.0 04/08/2021 08:00 PM    Protein 100 (A) 04/08/2021 08:00 PM    Glucose Negative 04/08/2021 08:00 PM    Ketone Negative 04/08/2021 08:00 PM    Bilirubin Negative 04/08/2021 08:00 PM    Urobilinogen 0.1 04/08/2021 08:00 PM    Nitrites Negative 04/08/2021 08:00 PM    Leukocyte Esterase Trace (A) 04/08/2021 08:00 PM    Bacteria 4+ (A) 04/08/2021 08:00 PM    WBC 20-50 04/08/2021 08:00 PM    RBC 0-5 04/08/2021 08:00 PM         Assessment:   COVID-19 pneumonitis  TAMIKO on CKD 3  Hypertension, chronic  Microcytic anemia of chronic diseaseat baseline    Plan:     Patient discharged to skilled care rehab

## 2021-04-18 NOTE — PROGRESS NOTES
DC order noted. Chart reviewed. Pt may go to room 223A. Please call report to [phone] after transport time is known. DC Summary and order faxed via [employer] to "[employer] for their use with the pts post hospital care needs.

## 2021-04-18 NOTE — PROGRESS NOTES
Renal progress Note    NAME:  Jesus Crook   :   1928   MRN:   [id number]     ATTENDING: Simeon Khan MD  PCP:  Layton Aldridge MD    Date/Time:  2021 2:25 PM      Subjective:   Patient seen . Seems to be in no distress. Creatinine has improved to 1.8  Better  BP    Past Medical History:   Diagnosis Date    Anemia     Chronic kidney disease     COVID-19     Hypertension     Hypothyroidism     Insomnia       History reviewed. No pertinent surgical history. Social History     Tobacco Use    Smoking status: Never Smoker    Smokeless tobacco: Never Used   Substance Use Topics    Alcohol use: Not on file      Family History   Family history unknown: Yes       No Known Allergies   Prior to Admission medications    Medication Sig Start Date End Date Taking? Authorizing Provider   ergocalciferol, vitamin D2, (VITAMIN D2 PO) Take  by mouth. Provider, Historical   trazodone HCl (TRAZODONE PO) Take  by mouth. Provider, Historical   LOSARTAN PO Take  by mouth. Provider, Historical   OXCARBAZEPINE PO Take  by mouth. Provider, Historical       REVIEW OF SYSTEMS:       Constitutional: Negative for chills and fever. HENT: Negative. Eyes: Negative. Respiratory: As above   cardiovascular: Negative. Gastrointestinal: Negative for abdominal pain and nausea. Skin: Negative. Neurological: Negative. Objective:   VITALS:    Visit Vitals  BP (!) 155/87 (BP 1 Location: Left upper arm)   Pulse 81   Temp 97.6 °F (36.4 °C)   Resp 16   Ht 5' 7.01\" (1.702 m)   Wt 61.8 kg (136 lb 3.2 oz)   SpO2 93%   Breastfeeding Unknown   BMI 21.33 kg/m²     Temp (24hrs), Av.5 °F (36.4 °C), Min:97.1 °F (36.2 °C), Max:97.7 °F (36.5 °C)      PHYSICAL EXAM:   General:    Alert, cooperative, no distress.      HEENT: Atraumatic, anicteric sclerae, pink conjunctivae     No oral ulcers, mucosa moist, throat clear  Neck:  Supple, symmetrical,  thyroid: non tender  Lungs:   Wheezing and rhonchi are heard chest wall:  No tenderness  No Accessory muscle use. Heart:   Regular  rhythm,  No  murmur   No edema  Abdomen:   Soft, non-tender. Bowel sounds normal  Extremities: No cyanosis. No clubbing  Skin:     Not pale. Not Jaundiced  No rashes   Psych:  Not anxious or agitated. Neurologic: Alert     LAB DATA REVIEWED:    Recent Results (from the past 24 hour(s))   GLUCOSE, POC    Collection Time: 04/17/21 11:42 AM   Result Value Ref Range    Glucose (POC) 179 (H) 65 - 100 mg/dL    Performed by [address] [place] [address]    RENAL FUNCTION PANEL    Collection Time: 04/18/21  9:03 AM   Result Value Ref Range    Sodium 140 136 - 145 mmol/L    Potassium 4.4 3.5 - 5.1 mmol/L    Chloride 109 (H) 97 - 108 mmol/L    CO2 24 21 - 32 mmol/L    Anion gap 7 5 - 15 mmol/L    Glucose 111 (H) 65 - 100 mg/dL    BUN 39 (H) 6 - 20 mg/dL    Creatinine 1.85 (H) 0.55 - 1.02 mg/dL    BUN/Creatinine ratio 21 (H) 12 - 20      GFR est AA 31 (L) >60 ml/min/1.73m2    GFR est non-AA 25 (L) >60 ml/min/1.73m2    Calcium 8.4 (L) 8.5 - 10.1 mg/dL    Phosphorus 3.4 2.6 - 4.7 mg/dL    Albumin 2.6 (L) 3.5 - 5.0 g/dL   METABOLIC PANEL, BASIC    Collection Time: 04/18/21  9:03 AM   Result Value Ref Range    Sodium 140 136 - 145 mmol/L    Potassium 4.5 3.5 - 5.1 mmol/L    Chloride 109 (H) 97 - 108 mmol/L    CO2 23 21 - 32 mmol/L    Anion gap 8 5 - 15 mmol/L    Glucose 111 (H) 65 - 100 mg/dL    BUN 39 (H) 6 - 20 mg/dL    Creatinine 1.84 (H) 0.55 - 1.02 mg/dL    BUN/Creatinine ratio 21 (H) 12 - 20      GFR est AA 31 (L) >60 ml/min/1.73m2    GFR est non-AA 26 (L) >60 ml/min/1.73m2    Calcium 8.4 (L) 8.5 - 10.1 mg/dL       Recommendations/Plan:       #1 acute kidney injury on CKD 3:  -presented with a creatinine of 3.2 which has improved to 1.8, BUN 39  -Baseline creatinine seems to be 1.8 based on labs in February 2021  -TAMIKO could be prerenal secondary to losartan which I will continue to hold  -Urine is bland.   2.5 g of proteinuria  -will dc IVF    #2 Hypertension:  -Blood pressure is better.    -Continue to hold losartan  -increased amlodipine 10 mg daily     #3 anemia  -Hemoglobin 9.3-->10.9.    -Likely anemia of chronic kidney disease. #4 Covid pneumonia  -Patient presented with shortness of breath. Covid positive  -Has been started on dexamethasone azithromycin and Rocephin  -no respiratory distress for now    #5 renal osteodystrophy  -Pending intact parathyroid hormone and 25-hydroxy vitamin D level.    -Calcium is okay we will check phosphorus level    #6 UTI  -Urine is suggestive of UTI.   She has been started on Rocephin          ________________________________________________________________________  Signed: Asuncion Penaloza MD

## 2021-05-08 ENCOUNTER — APPOINTMENT (OUTPATIENT)
Dept: GENERAL RADIOLOGY | Age: 86
End: 2021-05-08
Attending: NURSE PRACTITIONER
Payer: MEDICARE

## 2021-05-08 ENCOUNTER — HOSPITAL ENCOUNTER (EMERGENCY)
Age: 86
Discharge: HOME | End: 2021-05-09
Payer: MEDICARE

## 2021-05-08 ENCOUNTER — APPOINTMENT (OUTPATIENT)
Dept: CT IMAGING | Age: 86
End: 2021-05-08
Attending: NURSE PRACTITIONER
Payer: MEDICARE

## 2021-05-08 VITALS
DIASTOLIC BLOOD PRESSURE: 78 MMHG | RESPIRATION RATE: 22 BRPM | BODY MASS INDEX: 21.86 KG/M2 | HEART RATE: 77 BPM | OXYGEN SATURATION: 99 % | TEMPERATURE: 98 F | WEIGHT: 136 LBS | SYSTOLIC BLOOD PRESSURE: 142 MMHG | HEIGHT: 66 IN

## 2021-05-08 DIAGNOSIS — N28.9 RENAL INSUFFICIENCY: Primary | ICD-10-CM

## 2021-05-08 DIAGNOSIS — R73.9 HYPERGLYCEMIA: ICD-10-CM

## 2021-05-08 DIAGNOSIS — R91.1 LUNG NODULE: ICD-10-CM

## 2021-05-08 DIAGNOSIS — E87.70 HYPERVOLEMIA, UNSPECIFIED HYPERVOLEMIA TYPE: ICD-10-CM

## 2021-05-08 DIAGNOSIS — K56.41 FECAL IMPACTION (HCC): ICD-10-CM

## 2021-05-08 LAB
ALBUMIN SERPL-MCNC: 2.5 G/DL (ref 3.5–5)
ALBUMIN SERPL-MCNC: 2.9 G/DL (ref 3.5–5)
ALBUMIN/GLOB SERPL: 0.7 {RATIO} (ref 1.1–2.2)
ALBUMIN/GLOB SERPL: 0.7 {RATIO} (ref 1.1–2.2)
ALP SERPL-CCNC: 70 U/L (ref 45–117)
ALP SERPL-CCNC: 77 U/L (ref 45–117)
ALT SERPL-CCNC: 14 U/L (ref 12–78)
ALT SERPL-CCNC: 18 U/L (ref 12–78)
ANION GAP SERPL CALC-SCNC: 3 MMOL/L (ref 5–15)
ANION GAP SERPL CALC-SCNC: 6 MMOL/L (ref 5–15)
APPEARANCE UR: CLEAR
AST SERPL W P-5'-P-CCNC: 5 U/L (ref 15–37)
AST SERPL W P-5'-P-CCNC: ABNORMAL U/L (ref 15–37)
ATRIAL RATE: 72 BPM
BASOPHILS # BLD: 0 K/UL (ref 0–0.1)
BASOPHILS NFR BLD: 0 % (ref 0–1)
BILIRUB SERPL-MCNC: 0.5 MG/DL (ref 0.2–1)
BILIRUB SERPL-MCNC: 0.7 MG/DL (ref 0.2–1)
BILIRUB UR QL: NEGATIVE
BNP SERPL-MCNC: 2579 PG/ML
BUN SERPL-MCNC: 36 MG/DL (ref 6–20)
BUN SERPL-MCNC: 37 MG/DL (ref 6–20)
BUN/CREAT SERPL: 23 (ref 12–20)
BUN/CREAT SERPL: 26 (ref 12–20)
CA-I BLD-MCNC: 8.3 MG/DL (ref 8.5–10.1)
CA-I BLD-MCNC: 9.1 MG/DL (ref 8.5–10.1)
CALCULATED R AXIS, ECG10: -18 DEGREES
CALCULATED T AXIS, ECG11: 126 DEGREES
CHLORIDE SERPL-SCNC: 100 MMOL/L (ref 97–108)
CHLORIDE SERPL-SCNC: 101 MMOL/L (ref 97–108)
CO2 SERPL-SCNC: 28 MMOL/L (ref 21–32)
CO2 SERPL-SCNC: 29 MMOL/L (ref 21–32)
COLOR UR: NORMAL
CREAT SERPL-MCNC: 1.41 MG/DL (ref 0.55–1.02)
CREAT SERPL-MCNC: 1.6 MG/DL (ref 0.55–1.02)
DIAGNOSIS, 93000: NORMAL
DIFFERENTIAL METHOD BLD: ABNORMAL
EOSINOPHIL # BLD: 0 K/UL (ref 0–0.4)
EOSINOPHIL NFR BLD: 0 % (ref 0–7)
ERYTHROCYTE [DISTWIDTH] IN BLOOD BY AUTOMATED COUNT: 20.5 % (ref 11.5–14.5)
GLOBULIN SER CALC-MCNC: 3.6 G/DL (ref 2–4)
GLOBULIN SER CALC-MCNC: 4.3 G/DL (ref 2–4)
GLUCOSE SERPL-MCNC: 167 MG/DL (ref 65–100)
GLUCOSE SERPL-MCNC: 207 MG/DL (ref 65–100)
GLUCOSE UR STRIP.AUTO-MCNC: NEGATIVE MG/DL
HCT VFR BLD AUTO: 30.4 % (ref 35–47)
HGB BLD-MCNC: 9.7 G/DL (ref 11.5–16)
HGB UR QL STRIP: NEGATIVE
IMM GRANULOCYTES # BLD AUTO: 0.2 K/UL (ref 0–0.04)
IMM GRANULOCYTES NFR BLD AUTO: 2 % (ref 0–0.5)
KETONES UR QL STRIP.AUTO: NEGATIVE MG/DL
LACTATE SERPL-SCNC: 2 MMOL/L (ref 0.4–2)
LEUKOCYTE ESTERASE UR QL STRIP.AUTO: NEGATIVE
LIPASE SERPL-CCNC: 106 U/L (ref 73–393)
LYMPHOCYTES # BLD: 0.5 K/UL (ref 0.8–3.5)
LYMPHOCYTES NFR BLD: 6 % (ref 12–49)
MCH RBC QN AUTO: 23.7 PG (ref 26–34)
MCHC RBC AUTO-ENTMCNC: 31.9 G/DL (ref 30–36.5)
MCV RBC AUTO: 74.3 FL (ref 80–99)
MONOCYTES # BLD: 0.4 K/UL (ref 0–1)
MONOCYTES NFR BLD: 5 % (ref 5–13)
NEUTS SEG # BLD: 7.4 K/UL (ref 1.8–8)
NEUTS SEG NFR BLD: 87 % (ref 32–75)
NITRITE UR QL STRIP.AUTO: NEGATIVE
NRBC # BLD: 0 K/UL (ref 0–0.01)
NRBC BLD-RTO: 0 PER 100 WBC
PH UR STRIP: 6 [PH] (ref 5–8)
PLATELET # BLD AUTO: 323 K/UL (ref 150–400)
POTASSIUM SERPL-SCNC: 4.3 MMOL/L (ref 3.5–5.1)
POTASSIUM SERPL-SCNC: ABNORMAL MMOL/L (ref 3.5–5.1)
PROT SERPL-MCNC: 6.1 G/DL (ref 6.4–8.2)
PROT SERPL-MCNC: 7.2 G/DL (ref 6.4–8.2)
PROT UR STRIP-MCNC: NEGATIVE MG/DL
Q-T INTERVAL, ECG07: 376 MS
QRS DURATION, ECG06: 80 MS
QTC CALCULATION (BEZET), ECG08: 433 MS
RBC # BLD AUTO: 4.09 M/UL (ref 3.8–5.2)
RBC MORPH BLD: ABNORMAL
SODIUM SERPL-SCNC: 132 MMOL/L (ref 136–145)
SODIUM SERPL-SCNC: 135 MMOL/L (ref 136–145)
SP GR UR REFRACTOMETRY: <1.005 (ref 1–1.03)
TROPONIN I SERPL-MCNC: <0.05 NG/ML
UROBILINOGEN UR QL STRIP.AUTO: 0.1 EU/DL (ref 0.1–1)
VENTRICULAR RATE, ECG03: 80 BPM
WBC # BLD AUTO: 8.5 K/UL (ref 3.6–11)

## 2021-05-08 PROCEDURE — 83690 ASSAY OF LIPASE: CPT

## 2021-05-08 PROCEDURE — 71045 X-RAY EXAM CHEST 1 VIEW: CPT

## 2021-05-08 PROCEDURE — 36415 COLL VENOUS BLD VENIPUNCTURE: CPT

## 2021-05-08 PROCEDURE — 84484 ASSAY OF TROPONIN QUANT: CPT

## 2021-05-08 PROCEDURE — 83880 ASSAY OF NATRIURETIC PEPTIDE: CPT

## 2021-05-08 PROCEDURE — 83605 ASSAY OF LACTIC ACID: CPT

## 2021-05-08 PROCEDURE — 96374 THER/PROPH/DIAG INJ IV PUSH: CPT

## 2021-05-08 PROCEDURE — 80053 COMPREHEN METABOLIC PANEL: CPT

## 2021-05-08 PROCEDURE — 74176 CT ABD & PELVIS W/O CONTRAST: CPT

## 2021-05-08 PROCEDURE — 81003 URINALYSIS AUTO W/O SCOPE: CPT

## 2021-05-08 PROCEDURE — 74011250636 HC RX REV CODE- 250/636: Performed by: NURSE PRACTITIONER

## 2021-05-08 PROCEDURE — 85025 COMPLETE CBC W/AUTO DIFF WBC: CPT

## 2021-05-08 PROCEDURE — 99285 EMERGENCY DEPT VISIT HI MDM: CPT

## 2021-05-08 PROCEDURE — 93005 ELECTROCARDIOGRAM TRACING: CPT

## 2021-05-08 PROCEDURE — 87040 BLOOD CULTURE FOR BACTERIA: CPT

## 2021-05-08 RX ORDER — FUROSEMIDE 10 MG/ML
20 INJECTION INTRAMUSCULAR; INTRAVENOUS
Status: COMPLETED | OUTPATIENT
Start: 2021-05-08 | End: 2021-05-08

## 2021-05-08 RX ORDER — FUROSEMIDE 20 MG/1
20 TABLET ORAL DAILY
Qty: 3 TAB | Refills: 0 | Status: SHIPPED | OUTPATIENT
Start: 2021-05-08 | End: 2021-05-11

## 2021-05-08 RX ORDER — POLYETHYLENE GLYCOL 3350 17 G/17G
17 POWDER, FOR SOLUTION ORAL DAILY
Qty: 595 G | Refills: 0 | Status: SHIPPED | OUTPATIENT
Start: 2021-05-08

## 2021-05-08 RX ORDER — SODIUM CHLORIDE 9 MG/ML
75 INJECTION, SOLUTION INTRAVENOUS CONTINUOUS
Status: DISCONTINUED | OUTPATIENT
Start: 2021-05-08 | End: 2021-05-09 | Stop reason: HOSPADM

## 2021-05-08 RX ORDER — DOCUSATE SODIUM 100 MG/1
100 CAPSULE, LIQUID FILLED ORAL 2 TIMES DAILY
Qty: 60 CAP | Refills: 2 | Status: SHIPPED | OUTPATIENT
Start: 2021-05-08 | End: 2021-08-06

## 2021-05-08 RX ADMIN — SODIUM CHLORIDE 75 ML/HR: 9 INJECTION, SOLUTION INTRAVENOUS at 18:08

## 2021-05-08 RX ADMIN — FUROSEMIDE 20 MG: 10 INJECTION, SOLUTION INTRAMUSCULAR; INTRAVENOUS at 20:57

## 2021-05-08 NOTE — ED PROVIDER NOTES
EMERGENCY DEPARTMENT HISTORY AND PHYSICAL EXAM      Date: 5/8/2021  Patient Name: Arely Darling      History of Presenting Illness     Chief Complaint   Patient presents with    Constipation       History Provided By: nursing home and EMS    HPI: Arely Darling, 80 y.o. female with a past medical history significant Hypertension, dementia, hypothyroidism, CKD, anemia, insomnia, UTI, COVID-19 with pneumonia presents to the ED with cc of abdominal distention. Patient had x-ray performed 5/8/21 with moderate colonic ileus with constipation noted. Pt alert to person. Presents with Terrell catheter present. Patient currently denies any pain. There are no other complaints, changes, or physical findings at this time. PCP: Calvin Rowe MD    Current Facility-Administered Medications   Medication Dose Route Frequency Provider Last Rate Last Admin    0.9% sodium chloride infusion  75 mL/hr IntraVENous CONTINUOUS Moira Harding NP 75 mL/hr at 05/08/21 1808 75 mL/hr at 05/08/21 1808    furosemide (LASIX) injection 20 mg  20 mg IntraVENous NOW Aubrey NOA Cm         Current Outpatient Medications   Medication Sig Dispense Refill    polyethylene glycol (Miralax) 17 gram/dose powder Take 17 g by mouth daily. 1 tablespoon with 8 oz of water daily 595 g 0    docusate sodium (Colace) 100 mg capsule Take 1 Cap by mouth two (2) times a day for 90 days. 60 Cap 2    furosemide (Lasix) 20 mg tablet Take 1 Tab by mouth daily for 3 days. 3 Tab 0    amLODIPine (NORVASC) 10 mg tablet Take 1 Tab by mouth daily. 30 Tab 0    azithromycin (ZITHROMAX) 500 mg tab Take 1 Tab by mouth daily. 3 Tab 0    cloNIDine HCL (CATAPRES) 0.1 mg tablet Take 1 Tab by mouth every six (6) hours.  30 Tab 0    dexAMETHasone (Decadron) 4 mg tablet 1 tab daily 10 Tab 0       Past History     Past Medical History:  Past Medical History:   Diagnosis Date    Anemia     Chronic kidney disease     COVID-19     Hypertension     Hypothyroidism  Insomnia        Past Surgical History:  No past surgical history on file. Family History:  Family History   Family history unknown: Yes       Social History:  Social History     Tobacco Use    Smoking status: Never Smoker    Smokeless tobacco: Never Used   Substance Use Topics    Alcohol use: Not on file    Drug use: Not on file       Allergies:  No Known Allergies      Review of Systems     Review of Systems   Unable to perform ROS: Dementia       Physical Exam     Physical Exam  Vitals signs and nursing note reviewed. Exam conducted with a chaperone present. Constitutional:       General: She is not in acute distress. Appearance: Normal appearance. She is normal weight. She is not ill-appearing or toxic-appearing. HENT:      Head: Normocephalic and atraumatic. Right Ear: Hearing normal.      Left Ear: Hearing normal.      Nose: Nose normal.      Mouth/Throat:      Mouth: Mucous membranes are moist.   Eyes:      General: Lids are normal.      Extraocular Movements: Extraocular movements intact. Pupils: Pupils are equal, round, and reactive to light. Neck:      Musculoskeletal: Normal range of motion and neck supple. No muscular tenderness. Cardiovascular:      Rate and Rhythm: Normal rate and regular rhythm. Pulses: Normal pulses. Radial pulses are 2+ on the right side and 2+ on the left side. Dorsalis pedis pulses are 2+ on the right side and 2+ on the left side. Pulmonary:      Effort: Pulmonary effort is normal. No accessory muscle usage or respiratory distress. Breath sounds: Normal breath sounds. No wheezing or rhonchi. Abdominal:      General: There is distension. Palpations: Abdomen is soft. Tenderness: There is no abdominal tenderness. There is no right CVA tenderness or left CVA tenderness. Genitourinary:     Comments: Wound to sacrum   Musculoskeletal:      Right lower leg: No edema. Left lower leg: No edema.       Comments: Bunny boots present to pt heels bilaterally   Feet:      Right foot:      Skin integrity: No skin breakdown. Left foot:      Skin integrity: No skin breakdown. Skin:     General: Skin is warm and dry. Capillary Refill: Capillary refill takes less than 2 seconds. Findings: No abrasion, bruising, ecchymosis, erythema or signs of injury. Neurological:      Mental Status: She is alert and oriented to person, place, and time. GCS: GCS eye subscore is 4. GCS verbal subscore is 4. GCS motor subscore is 6. Psychiatric:         Attention and Perception: Attention normal.         Mood and Affect: Mood normal.         Behavior: Behavior normal. Behavior is cooperative. Cognition and Memory: Memory is impaired. Lab and Diagnostic Study Results     Labs -     Recent Results (from the past 12 hour(s))   URINALYSIS W/ RFLX MICROSCOPIC    Collection Time: 05/08/21  3:45 PM   Result Value Ref Range    Color Yellow/Straw      Appearance Clear Clear      Specific gravity <1.005 1.003 - 1.030    pH (UA) 6.0 5.0 - 8.0      Protein Negative Negative mg/dL    Glucose Negative Negative mg/dL    Ketone Negative Negative mg/dL    Bilirubin Negative Negative      Blood Negative Negative      Urobilinogen 0.1 0.1 - 1.0 EU/dL    Nitrites Negative Negative      Leukocyte Esterase Negative Negative     CBC WITH AUTOMATED DIFF    Collection Time: 05/08/21  4:15 PM   Result Value Ref Range    WBC 8.5 3.6 - 11.0 K/uL    RBC 4.09 3.80 - 5.20 M/uL    HGB 9.7 (L) 11.5 - 16.0 g/dL    HCT 30.4 (L) 35.0 - 47.0 %    MCV 74.3 (L) 80.0 - 99.0 FL    MCH 23.7 (L) 26.0 - 34.0 PG    MCHC 31.9 30.0 - 36.5 g/dL    RDW 20.5 (H) 11.5 - 14.5 %    PLATELET 719 935 - 290 K/uL    NRBC 0.0 0.0  WBC    ABSOLUTE NRBC 0.00 0.00 - 0.01 K/uL    NEUTROPHILS 87 (H) 32 - 75 %    LYMPHOCYTES 6 (L) 12 - 49 %    MONOCYTES 5 5 - 13 %    EOSINOPHILS 0 0 - 7 %    BASOPHILS 0 0 - 1 %    IMMATURE GRANULOCYTES 2 (H) 0 - 0.5 %    ABS. NEUTROPHILS 7.4 1.8 - 8.0 K/UL    ABS. LYMPHOCYTES 0.5 (L) 0.8 - 3.5 K/UL    ABS. MONOCYTES 0.4 0.0 - 1.0 K/UL    ABS. EOSINOPHILS 0.0 0.0 - 0.4 K/UL    ABS. BASOPHILS 0.0 0.0 - 0.1 K/UL    ABS. IMM. GRANS. 0.2 (H) 0.00 - 0.04 K/UL    DF AUTOMATED      RBC COMMENTS Macrocytosis  1+        RBC COMMENTS Microcytosis  1+        RBC COMMENTS Anisocytosis  2+        RBC COMMENTS Hypochromia  1+        RBC COMMENTS Ovalocytes  2+       METABOLIC PANEL, COMPREHENSIVE    Collection Time: 05/08/21  4:15 PM   Result Value Ref Range    Sodium 132 (L) 136 - 145 mmol/L    Potassium Hemolyzed, recollect requested 3.5 - 5.1 mmol/L    Chloride 100 97 - 108 mmol/L    CO2 29 21 - 32 mmol/L    Anion gap 3 (L) 5 - 15 mmol/L    Glucose 207 (H) 65 - 100 mg/dL    BUN 36 (H) 6 - 20 mg/dL    Creatinine 1.60 (H) 0.55 - 1.02 mg/dL    BUN/Creatinine ratio 23 (H) 12 - 20      GFR est AA 37 (L) >60 ml/min/1.73m2    GFR est non-AA 30 (L) >60 ml/min/1.73m2    Calcium 9.1 8.5 - 10.1 mg/dL    Bilirubin, total 0.7 0.2 - 1.0 mg/dL    AST (SGOT) Hemolyzed, recollect requested 15 - 37 U/L    ALT (SGPT) 18 12 - 78 U/L    Alk.  phosphatase 77 45 - 117 U/L    Protein, total 7.2 6.4 - 8.2 g/dL    Albumin 2.9 (L) 3.5 - 5.0 g/dL    Globulin 4.3 (H) 2.0 - 4.0 g/dL    A-G Ratio 0.7 (L) 1.1 - 2.2     LIPASE    Collection Time: 05/08/21  4:15 PM   Result Value Ref Range    Lipase 106 73 - 393 U/L   TROPONIN I    Collection Time: 05/08/21  4:15 PM   Result Value Ref Range    Troponin-I, Qt. <0.05 <0.05 ng/mL   LACTIC ACID    Collection Time: 05/08/21  4:15 PM   Result Value Ref Range    Lactic acid 2.0 0.4 - 2.0 mmol/L   EKG, 12 LEAD, INITIAL    Collection Time: 05/08/21  4:30 PM   Result Value Ref Range    Ventricular Rate 80 BPM    Atrial Rate 72 BPM    QRS Duration 80 ms    Q-T Interval 376 ms    QTC Calculation (Bezet) 433 ms    Calculated R Axis -18 degrees    Calculated T Axis 126 degrees    Diagnosis       Atrial fibrillation  Minimal voltage criteria for LVH, may be normal variant  Anterior infarct , age undetermined  ST & T wave abnormality, consider lateral ischemia  Abnormal ECG  No previous ECGs available  Confirmed by RAJEEV FELTON ([id number]) on 5/8/2021 3:31:46 PM     METABOLIC PANEL, COMPREHENSIVE    Collection Time: 05/08/21  5:30 PM   Result Value Ref Range    Sodium 135 (L) 136 - 145 mmol/L    Potassium 4.3 3.5 - 5.1 mmol/L    Chloride 101 97 - 108 mmol/L    CO2 28 21 - 32 mmol/L    Anion gap 6 5 - 15 mmol/L    Glucose 167 (H) 65 - 100 mg/dL    BUN 37 (H) 6 - 20 mg/dL    Creatinine 1.41 (H) 0.55 - 1.02 mg/dL    BUN/Creatinine ratio 26 (H) 12 - 20      GFR est AA 42 (L) >60 ml/min/1.73m2    GFR est non-AA 35 (L) >60 ml/min/1.73m2    Calcium 8.3 (L) 8.5 - 10.1 mg/dL    Bilirubin, total 0.5 0.2 - 1.0 mg/dL    AST (SGOT) 5 (L) 15 - 37 U/L    ALT (SGPT) 14 12 - 78 U/L    Alk. phosphatase 70 45 - 117 U/L    Protein, total 6.1 (L) 6.4 - 8.2 g/dL    Albumin 2.5 (L) 3.5 - 5.0 g/dL    Globulin 3.6 2.0 - 4.0 g/dL    A-G Ratio 0.7 (L) 1.1 - 2.2     BNP    Collection Time: 05/08/21  7:15 PM   Result Value Ref Range    NT pro-BNP 2,579 (H) <450 pg/mL       Radiologic Studies -   [unfilled]  CT Results  (Last 48 hours)               05/08/21 1815  CT ABD PELV WO CONT Final result    Impression:  Fecal impaction and severe constipation. No other evidence to   explain distention. Pronounced lower body wall edema likely due to fluid   overload. At some point, suspicious right lung nodule will need further   evaluation. Narrative:  CT dose reduction was achieved through use of a standardized protocol tailored   for this examination and automatic exposure control for dose modulation. Noncontrast study shows a 15 mm nodule in the right middle lobe with spiculation   linear scar. Moderate left larger than right pleural effusions with compressive   atelectasis.        Liver, spleen, pancreas, gallbladder, adrenals and kidneys are normal. Advanced   arterial calcification. No small bowel abnormality, lymphadenopathy or free   fluid       Uterus is out. No mass or free fluid. Catheterize bladder is empty. Fecal   impaction in the rectum with severe constipation above. Severe lower body wall   edema       Advanced degenerative changes in the spine               CXR Results  (Last 48 hours)    None          Medical Decision Making and ED Course   - I am the first and primary provider for this patient AND AM THE PRIMARY PROVIDER OF RECORD. - I reviewed the vital signs, available nursing notes, past medical history, past surgical history, family history and social history. - Initial assessment performed. The patients presenting problems have been discussed, and the staff are in agreement with the care plan formulated and outlined with them. I have encouraged them to ask questions as they arise throughout their visit. Vital Signs-Reviewed the patient's vital signs. Patient Vitals for the past 12 hrs:   Temp Pulse Resp BP SpO2   05/08/21 1936 97.5 °F (36.4 °C) 77  (!) 128/95 98 %   05/08/21 1524 97.7 °F (36.5 °C) 91 28 (!) 145/83        EKG interpretation: (Preliminary): Performed at 1630, and read at 1636  Rhythm: atrial fib; and irregular. Rate (approx.): 80; Axis: normal; [place] interval:  QRS interval: normal ; ST/T wave: non-specific changes; Other findings: abnormal ekg. Records Reviewed: Nursing home     The patient presents with abdominal pain with a differential diagnosis of abdominal pain, gastroenteritis, obstruction and constipation    ED Course:       ED Course as of May 08 2049   Sat May 08, 2021   1539 TC to [facility] at [phone] [phone] in attempts to get reports. No one answered at this time.     [AM]   2027 NT pro-BNP(!): 2,579 [AM]   2041 Consult with Dr. Urbano Goins regarding patient's elevated BNP. He verbalized under standing patient's not hypoxic not tachycardic advised of IV Lasix given x1.   Per MD patient can be discharged back to facility with p.o. Lasix he will reevaluate labs in the morning.    [AM]      ED Course User Index  [AM] Lorie Clifton NP         Provider Notes (Medical Decision Making):   CT with fecal impaction noted. Soapsuds enema with positive bowel movement. Incidental finding of lower lobe nodule. Chest x-ray ordered for additional follow-up. BNP noted to be elevated patient given IV dose of Lasix spoke with PCP Dr. Chandrakant Chahal who reports patient can be discharged back to facility patient. not hypoxic SPO2 98% on room air no dyspnea not tachycardic. Trace edema noted to bilateral lower extremities. Patient discharged with Willie Hernandez. pending repeat EKG and chest xray results. Reports given to MATTHEW Allen who will place disposition when results are received. Consultations:       Consultations: Dr. Chandrakant Chahal        Procedures and Neda Ardon, NP        Disposition     Disposition: DC- Adult Discharges: All of the diagnostic tests were reviewed and questions answered. Diagnosis, care plan and treatment options were discussed. The patient understands the instructions and will follow up as directed. The patients results have been reviewed with them. They have been counseled regarding their diagnosis. The nursing home verbally convey understanding and agreement of the signs, symptoms, diagnosis, treatment and prognosis and additionally agrees to follow up as recommended with their PCP in 24 - 48 hours. They also agree with the care-plan and convey that all of their questions have been answered. I have also put together some discharge instructions for them that include: 1) educational information regarding their diagnosis, 2) how to care for their diagnosis at home, as well a 3) list of reasons why they would want to return to the ED prior to their follow-up appointment, should their condition change. DISCHARGE PLAN:  1.    Current Discharge Medication List      CONTINUE these medications which have NOT CHANGED    Details   amLODIPine (NORVASC) 10 mg tablet Take 1 Tab by mouth daily. Qty: 30 Tab, Refills: 0      azithromycin (ZITHROMAX) 500 mg tab Take 1 Tab by mouth daily. Qty: 3 Tab, Refills: 0      cloNIDine HCL (CATAPRES) 0.1 mg tablet Take 1 Tab by mouth every six (6) hours. Qty: 30 Tab, Refills: 0      dexAMETHasone (Decadron) 4 mg tablet 1 tab daily  Qty: 10 Tab, Refills: 0           2. Follow-up Information    None       3. Return to ED if worse   4. Current Discharge Medication List      START taking these medications    Details   polyethylene glycol (Miralax) 17 gram/dose powder Take 17 g by mouth daily. 1 tablespoon with 8 oz of water daily  Qty: 595 g, Refills: 0  Start date: 5/8/2021      docusate sodium (Colace) 100 mg capsule Take 1 Cap by mouth two (2) times a day for 90 days. Qty: 60 Cap, Refills: 2  Start date: 5/8/2021, End date: 8/6/2021      furosemide (Lasix) 20 mg tablet Take 1 Tab by mouth daily for 3 days. Qty: 3 Tab, Refills: 0  Start date: 5/8/2021, End date: 5/11/2021             Diagnosis     Clinical Impression:   1. Renal insufficiency    2. Hyperglycemia    3. Fecal impaction (HCC)    4. Lung nodule    5. Hypervolemia, unspecified hypervolemia type        Attestations:    Dre Chang NP    Please note that this dictation was completed with [employer], the computer voice recognition software. Quite often unanticipated grammatical, syntax, homophones, and other interpretive errors are inadvertently transcribed by the computer software. Please disregard these errors. Please excuse any errors that have escaped final proofreading. Thank you.

## 2021-05-08 NOTE — ED TRIAGE NOTES
Pt from [facility] [address] home. Staff noticed abdominal distention and constipation for past few days. Pt alert and oriented only to self in triage. Significant abdominal distention noted in triage - no pain on palpation.

## 2021-05-09 NOTE — DISCHARGE INSTRUCTIONS
Thank you! Thank you for allowing me to care for you in the emergency department. I sincerely hope that you are satisfied with your visit today. It is my goal to provide you with excellent care. Below you will find a list of your labs and imaging from your visit today. Should you have any questions regarding these results please do not hesitate to call the emergency department. Labs -     Recent Results (from the past 12 hour(s))   URINALYSIS W/ RFLX MICROSCOPIC    Collection Time: 05/08/21  3:45 PM   Result Value Ref Range    Color Yellow/Straw      Appearance Clear Clear      Specific gravity <1.005 1.003 - 1.030    pH (UA) 6.0 5.0 - 8.0      Protein Negative Negative mg/dL    Glucose Negative Negative mg/dL    Ketone Negative Negative mg/dL    Bilirubin Negative Negative      Blood Negative Negative      Urobilinogen 0.1 0.1 - 1.0 EU/dL    Nitrites Negative Negative      Leukocyte Esterase Negative Negative     CBC WITH AUTOMATED DIFF    Collection Time: 05/08/21  4:15 PM   Result Value Ref Range    WBC 8.5 3.6 - 11.0 K/uL    RBC 4.09 3.80 - 5.20 M/uL    HGB 9.7 (L) 11.5 - 16.0 g/dL    HCT 30.4 (L) 35.0 - 47.0 %    MCV 74.3 (L) 80.0 - 99.0 FL    MCH 23.7 (L) 26.0 - 34.0 PG    MCHC 31.9 30.0 - 36.5 g/dL    RDW 20.5 (H) 11.5 - 14.5 %    PLATELET 207 913 - 275 K/uL    NRBC 0.0 0.0  WBC    ABSOLUTE NRBC 0.00 0.00 - 0.01 K/uL    NEUTROPHILS 87 (H) 32 - 75 %    LYMPHOCYTES 6 (L) 12 - 49 %    MONOCYTES 5 5 - 13 %    EOSINOPHILS 0 0 - 7 %    BASOPHILS 0 0 - 1 %    IMMATURE GRANULOCYTES 2 (H) 0 - 0.5 %    ABS. NEUTROPHILS 7.4 1.8 - 8.0 K/UL    ABS. LYMPHOCYTES 0.5 (L) 0.8 - 3.5 K/UL    ABS. MONOCYTES 0.4 0.0 - 1.0 K/UL    ABS. EOSINOPHILS 0.0 0.0 - 0.4 K/UL    ABS. BASOPHILS 0.0 0.0 - 0.1 K/UL    ABS. IMM.  GRANS. 0.2 (H) 0.00 - 0.04 K/UL    DF AUTOMATED      RBC COMMENTS Macrocytosis  1+        RBC COMMENTS Microcytosis  1+        RBC COMMENTS Anisocytosis  2+        RBC COMMENTS Hypochromia  1+ RBC COMMENTS Ovalocytes  2+       METABOLIC PANEL, COMPREHENSIVE    Collection Time: 05/08/21  4:15 PM   Result Value Ref Range    Sodium 132 (L) 136 - 145 mmol/L    Potassium Hemolyzed, recollect requested 3.5 - 5.1 mmol/L    Chloride 100 97 - 108 mmol/L    CO2 29 21 - 32 mmol/L    Anion gap 3 (L) 5 - 15 mmol/L    Glucose 207 (H) 65 - 100 mg/dL    BUN 36 (H) 6 - 20 mg/dL    Creatinine 1.60 (H) 0.55 - 1.02 mg/dL    BUN/Creatinine ratio 23 (H) 12 - 20      GFR est AA 37 (L) >60 ml/min/1.73m2    GFR est non-AA 30 (L) >60 ml/min/1.73m2    Calcium 9.1 8.5 - 10.1 mg/dL    Bilirubin, total 0.7 0.2 - 1.0 mg/dL    AST (SGOT) Hemolyzed, recollect requested 15 - 37 U/L    ALT (SGPT) 18 12 - 78 U/L    Alk.  phosphatase 77 45 - 117 U/L    Protein, total 7.2 6.4 - 8.2 g/dL    Albumin 2.9 (L) 3.5 - 5.0 g/dL    Globulin 4.3 (H) 2.0 - 4.0 g/dL    A-G Ratio 0.7 (L) 1.1 - 2.2     LIPASE    Collection Time: 05/08/21  4:15 PM   Result Value Ref Range    Lipase 106 73 - 393 U/L   TROPONIN I    Collection Time: 05/08/21  4:15 PM   Result Value Ref Range    Troponin-I, Qt. <0.05 <0.05 ng/mL   LACTIC ACID    Collection Time: 05/08/21  4:15 PM   Result Value Ref Range    Lactic acid 2.0 0.4 - 2.0 mmol/L   EKG, 12 LEAD, INITIAL    Collection Time: 05/08/21  4:30 PM   Result Value Ref Range    Ventricular Rate 80 BPM    Atrial Rate 72 BPM    QRS Duration 80 ms    Q-T Interval 376 ms    QTC Calculation (Bezet) 433 ms    Calculated R Axis -18 degrees    Calculated T Axis 126 degrees    Diagnosis       Atrial fibrillation  Minimal voltage criteria for LVH, may be normal variant  Anterior infarct , age undetermined  ST & T wave abnormality, consider lateral ischemia  Abnormal ECG  No previous ECGs available  Confirmed by RAJEEV FELTON ([id number]) on 5/8/2021 6:26:65 PM     METABOLIC PANEL, COMPREHENSIVE    Collection Time: 05/08/21  5:30 PM   Result Value Ref Range    Sodium 135 (L) 136 - 145 mmol/L    Potassium 4.3 3.5 - 5.1 mmol/L    Chloride 101 97 - 108 mmol/L    CO2 28 21 - 32 mmol/L    Anion gap 6 5 - 15 mmol/L    Glucose 167 (H) 65 - 100 mg/dL    BUN 37 (H) 6 - 20 mg/dL    Creatinine 1.41 (H) 0.55 - 1.02 mg/dL    BUN/Creatinine ratio 26 (H) 12 - 20      GFR est AA 42 (L) >60 ml/min/1.73m2    GFR est non-AA 35 (L) >60 ml/min/1.73m2    Calcium 8.3 (L) 8.5 - 10.1 mg/dL    Bilirubin, total 0.5 0.2 - 1.0 mg/dL    AST (SGOT) 5 (L) 15 - 37 U/L    ALT (SGPT) 14 12 - 78 U/L    Alk. phosphatase 70 45 - 117 U/L    Protein, total 6.1 (L) 6.4 - 8.2 g/dL    Albumin 2.5 (L) 3.5 - 5.0 g/dL    Globulin 3.6 2.0 - 4.0 g/dL    A-G Ratio 0.7 (L) 1.1 - 2.2     BNP    Collection Time: 05/08/21  7:15 PM   Result Value Ref Range    NT pro-BNP 2,579 (H) <450 pg/mL       Radiologic Studies -   CT ABD PELV WO CONT   Final Result   Fecal impaction and severe constipation. No other evidence to   explain distention. Pronounced lower body wall edema likely due to fluid   overload. At some point, suspicious right lung nodule will need further   evaluation. XR CHEST SNGL V    (Results Pending)     CT Results  (Last 48 hours)                 05/08/21 1815  CT ABD PELV WO CONT Final result    Impression:  Fecal impaction and severe constipation. No other evidence to   explain distention. Pronounced lower body wall edema likely due to fluid   overload. At some point, suspicious right lung nodule will need further   evaluation. Narrative:  CT dose reduction was achieved through use of a standardized protocol tailored   for this examination and automatic exposure control for dose modulation. Noncontrast study shows a 15 mm nodule in the right middle lobe with spiculation   linear scar. Moderate left larger than right pleural effusions with compressive   atelectasis. Liver, spleen, pancreas, gallbladder, adrenals and kidneys are normal. Advanced   arterial calcification. No small bowel abnormality, lymphadenopathy or free   fluid       Uterus is out. No mass or free fluid. Catheterize bladder is empty. Fecal   impaction in the rectum with severe constipation above. Severe lower body wall   edema       Advanced degenerative changes in the spine                 CXR Results  (Last 48 hours)      None               If you feel that you have not received excellent quality care or timely care, please ask to speak to the nurse manager. Please choose us in the future for your continued health care needs. ------------------------------------------------------------------------------------------------------------  The exam and treatment you received in the Emergency Department were for an urgent problem and are not intended as complete care. It is important that you follow-up with a doctor, nurse practitioner, or physician assistant to:  (1) confirm your diagnosis,  (2) re-evaluation of changes in your illness and treatment, and  (3) for ongoing care. If your symptoms become worse or you do not improve as expected and you are unable to reach your usual health care provider, you should return to the Emergency Department. We are available 24 hours a day. Please take your discharge instructions with you when you go to your follow-up appointment. If you have any problem arranging a follow-up appointment, contact the Emergency Department immediately. If a prescription has been provided, please have it filled as soon as possible to prevent a delay in treatment. Read the entire medication instruction sheet provided to you by the pharmacy. If you have any questions or reservations about taking the medication due to side effects or interactions with other medications, please call your primary care physician or contact the ER to speak with the charge nurse. Make an appointment with your family doctor or the physician you were referred to for follow-up of this visit as instructed on your discharge paperwork, as this is a mandatory follow-up.  Return to the ER if you are unable to be seen or if you are unable to be seen in a timely manner. If you have any problem arranging the follow-up visit, contact the Emergency Department immediately.

## 2021-05-09 NOTE — ED NOTES
Pt given sse. Moderate return of dark sticky soft feces. Pt indwelling burdick also fell out of urethra as balloon was not inflated. New one inserted. Pt has full thickness wound to saral area that was also redressed with mepilex.

## 2021-05-11 LAB
ATRIAL RATE: 87 BPM
CALCULATED R AXIS, ECG10: 37 DEGREES
CALCULATED T AXIS, ECG11: -168 DEGREES
DIAGNOSIS, 93000: NORMAL
P-R INTERVAL, ECG05: 168 MS
Q-T INTERVAL, ECG07: 378 MS
QRS DURATION, ECG06: 82 MS
QTC CALCULATION (BEZET), ECG08: 454 MS
VENTRICULAR RATE, ECG03: 87 BPM

## 2021-05-14 LAB
BACTERIA SPEC CULT: NORMAL
SPECIAL REQUESTS,SREQ: NORMAL

## 2022-03-18 PROBLEM — J18.9 PNEUMONIA: Status: ACTIVE | Noted: 2021-04-09

## 2022-03-19 PROBLEM — U07.1 COVID-19: Status: ACTIVE | Noted: 2021-04-09

## 2023-05-14 RX ORDER — DEXAMETHASONE 4 MG/1
TABLET ORAL
COMMUNITY
Start: 2021-04-18

## 2023-05-14 RX ORDER — POLYETHYLENE GLYCOL 3350 17 G/17G
17 POWDER, FOR SOLUTION ORAL DAILY
COMMUNITY
Start: 2021-05-08

## 2023-05-14 RX ORDER — CLONIDINE HYDROCHLORIDE 0.1 MG/1
0.1 TABLET ORAL EVERY 6 HOURS
COMMUNITY
Start: 2021-04-18

## 2023-05-14 RX ORDER — AZITHROMYCIN 500 MG/1
500 TABLET, FILM COATED ORAL DAILY
COMMUNITY
Start: 2021-04-19

## 2023-05-14 RX ORDER — AMLODIPINE BESYLATE 10 MG/1
10 TABLET ORAL DAILY
COMMUNITY
Start: 2021-04-19